# Patient Record
Sex: FEMALE | Race: BLACK OR AFRICAN AMERICAN | Employment: OTHER | URBAN - METROPOLITAN AREA
[De-identification: names, ages, dates, MRNs, and addresses within clinical notes are randomized per-mention and may not be internally consistent; named-entity substitution may affect disease eponyms.]

---

## 2017-01-17 ENCOUNTER — TELEPHONE (OUTPATIENT)
Dept: ORTHOPEDIC SURGERY | Age: 72
End: 2017-01-17

## 2017-01-17 DIAGNOSIS — S96.911A RIGHT FOOT STRAIN, INITIAL ENCOUNTER: Primary | ICD-10-CM

## 2017-01-19 ENCOUNTER — OFFICE VISIT (OUTPATIENT)
Dept: ORTHOPEDIC SURGERY | Age: 72
End: 2017-01-19

## 2017-01-19 VITALS — HEIGHT: 63 IN | BODY MASS INDEX: 37.74 KG/M2 | WEIGHT: 213 LBS | TEMPERATURE: 99.3 F | RESPIRATION RATE: 16 BRPM

## 2017-01-19 DIAGNOSIS — T84.59XS INFECTED PROSTHETIC KNEE JOINT, SEQUELA: ICD-10-CM

## 2017-01-19 DIAGNOSIS — Z96.659 INFECTED PROSTHETIC KNEE JOINT, SEQUELA: ICD-10-CM

## 2017-01-19 DIAGNOSIS — Z96.651 HISTORY OF TOTAL RIGHT KNEE REPLACEMENT: Primary | ICD-10-CM

## 2017-01-19 PROCEDURE — 99024 POSTOP FOLLOW-UP VISIT: CPT | Performed by: PHYSICIAN ASSISTANT

## 2017-01-19 PROCEDURE — 73562 X-RAY EXAM OF KNEE 3: CPT | Performed by: PHYSICIAN ASSISTANT

## 2017-01-19 RX ORDER — OXYCODONE HYDROCHLORIDE AND ACETAMINOPHEN 5; 325 MG/1; MG/1
1 TABLET ORAL EVERY 6 HOURS PRN
Qty: 60 TABLET | Refills: 0 | Status: SHIPPED | OUTPATIENT
Start: 2017-01-19 | End: 2017-03-20

## 2017-01-24 ENCOUNTER — TELEPHONE (OUTPATIENT)
Dept: ORTHOPEDIC SURGERY | Age: 72
End: 2017-01-24

## 2017-01-27 ENCOUNTER — OFFICE VISIT (OUTPATIENT)
Dept: INTERNAL MEDICINE CLINIC | Age: 72
End: 2017-01-27

## 2017-01-27 VITALS
HEART RATE: 84 BPM | OXYGEN SATURATION: 97 % | HEIGHT: 62 IN | SYSTOLIC BLOOD PRESSURE: 124 MMHG | BODY MASS INDEX: 36.25 KG/M2 | TEMPERATURE: 99.1 F | DIASTOLIC BLOOD PRESSURE: 74 MMHG | WEIGHT: 197 LBS

## 2017-01-27 DIAGNOSIS — I10 HYPERTENSION GOAL BP (BLOOD PRESSURE) < 130/80: ICD-10-CM

## 2017-01-27 DIAGNOSIS — D64.89 ANEMIA DUE TO OTHER CAUSE: ICD-10-CM

## 2017-01-27 DIAGNOSIS — E55.9 VITAMIN D DEFICIENCY: ICD-10-CM

## 2017-01-27 DIAGNOSIS — F17.200 TOBACCO DEPENDENCE: ICD-10-CM

## 2017-01-27 DIAGNOSIS — E66.9 OBESITY (BMI 30-39.9): ICD-10-CM

## 2017-01-27 DIAGNOSIS — N18.6 ESRD (END STAGE RENAL DISEASE) (HCC): ICD-10-CM

## 2017-01-27 DIAGNOSIS — E78.49 OTHER HYPERLIPIDEMIA: ICD-10-CM

## 2017-01-27 DIAGNOSIS — K92.0 GASTROINTESTINAL HEMORRHAGE WITH HEMATEMESIS: Primary | ICD-10-CM

## 2017-01-27 DIAGNOSIS — Z98.890 S/P KNEE SURGERY: ICD-10-CM

## 2017-01-27 DIAGNOSIS — K31.89 GASTRIC MASS: ICD-10-CM

## 2017-01-27 PROCEDURE — 99214 OFFICE O/P EST MOD 30 MIN: CPT | Performed by: INTERNAL MEDICINE

## 2017-01-27 PROCEDURE — 99406 BEHAV CHNG SMOKING 3-10 MIN: CPT | Performed by: INTERNAL MEDICINE

## 2017-01-27 RX ORDER — FAMOTIDINE 20 MG
1 TABLET ORAL DAILY
Qty: 30 CAPSULE | Refills: 3 | Status: SHIPPED | OUTPATIENT
Start: 2017-01-27 | End: 2017-02-16 | Stop reason: SDUPTHER

## 2017-01-27 RX ORDER — BUPROPION HYDROCHLORIDE 150 MG/1
150 TABLET ORAL EVERY MORNING
Qty: 30 TABLET | Refills: 3 | Status: SHIPPED | OUTPATIENT
Start: 2017-01-27 | End: 2017-07-13 | Stop reason: SDUPTHER

## 2017-02-02 ENCOUNTER — TELEPHONE (OUTPATIENT)
Dept: INTERNAL MEDICINE CLINIC | Age: 72
End: 2017-02-02

## 2017-02-08 ENCOUNTER — OFFICE VISIT (OUTPATIENT)
Dept: ORTHOPEDIC SURGERY | Age: 72
End: 2017-02-08

## 2017-02-08 VITALS — RESPIRATION RATE: 16 BRPM | BODY MASS INDEX: 36.25 KG/M2 | WEIGHT: 197 LBS | HEART RATE: 72 BPM | HEIGHT: 62 IN

## 2017-02-08 DIAGNOSIS — S96.911A RIGHT FOOT STRAIN, INITIAL ENCOUNTER: Primary | ICD-10-CM

## 2017-02-08 PROCEDURE — 73610 X-RAY EXAM OF ANKLE: CPT | Performed by: ORTHOPAEDIC SURGERY

## 2017-02-08 PROCEDURE — 99213 OFFICE O/P EST LOW 20 MIN: CPT | Performed by: ORTHOPAEDIC SURGERY

## 2017-02-08 PROCEDURE — 73630 X-RAY EXAM OF FOOT: CPT | Performed by: ORTHOPAEDIC SURGERY

## 2017-02-09 ENCOUNTER — OFFICE VISIT (OUTPATIENT)
Dept: CARDIOLOGY CLINIC | Age: 72
End: 2017-02-09

## 2017-02-09 ENCOUNTER — TELEPHONE (OUTPATIENT)
Dept: INTERNAL MEDICINE CLINIC | Age: 72
End: 2017-02-09

## 2017-02-09 VITALS
BODY MASS INDEX: 36.03 KG/M2 | WEIGHT: 197 LBS | DIASTOLIC BLOOD PRESSURE: 50 MMHG | HEART RATE: 76 BPM | SYSTOLIC BLOOD PRESSURE: 110 MMHG

## 2017-02-09 DIAGNOSIS — E55.9 VITAMIN D DEFICIENCY: ICD-10-CM

## 2017-02-09 DIAGNOSIS — I10 ESSENTIAL HYPERTENSION: Primary | ICD-10-CM

## 2017-02-09 DIAGNOSIS — Z86.79 H/O CARDIOMYOPATHY: ICD-10-CM

## 2017-02-09 PROCEDURE — 99214 OFFICE O/P EST MOD 30 MIN: CPT | Performed by: INTERNAL MEDICINE

## 2017-02-16 ENCOUNTER — OFFICE VISIT (OUTPATIENT)
Dept: INTERNAL MEDICINE CLINIC | Age: 72
End: 2017-02-16

## 2017-02-16 VITALS
DIASTOLIC BLOOD PRESSURE: 78 MMHG | HEART RATE: 70 BPM | WEIGHT: 207.8 LBS | BODY MASS INDEX: 38.24 KG/M2 | OXYGEN SATURATION: 98 % | HEIGHT: 62 IN | SYSTOLIC BLOOD PRESSURE: 120 MMHG

## 2017-02-16 DIAGNOSIS — N18.6 ESRD (END STAGE RENAL DISEASE) (HCC): ICD-10-CM

## 2017-02-16 DIAGNOSIS — K92.2 GASTROINTESTINAL HEMORRHAGE, UNSPECIFIED GASTROINTESTINAL HEMORRHAGE TYPE: ICD-10-CM

## 2017-02-16 DIAGNOSIS — M81.0 OSTEOPOROSIS: ICD-10-CM

## 2017-02-16 DIAGNOSIS — Z86.79 H/O CARDIOMYOPATHY: ICD-10-CM

## 2017-02-16 DIAGNOSIS — E55.9 VITAMIN D DEFICIENCY: ICD-10-CM

## 2017-02-16 DIAGNOSIS — K92.0 GASTROINTESTINAL HEMORRHAGE WITH HEMATEMESIS: ICD-10-CM

## 2017-02-16 DIAGNOSIS — M79.671 FOOT PAIN, RIGHT: ICD-10-CM

## 2017-02-16 DIAGNOSIS — E78.49 OTHER HYPERLIPIDEMIA: ICD-10-CM

## 2017-02-16 DIAGNOSIS — I10 ESSENTIAL HYPERTENSION: ICD-10-CM

## 2017-02-16 DIAGNOSIS — F17.200 TOBACCO DEPENDENCE: ICD-10-CM

## 2017-02-16 DIAGNOSIS — D64.89 ANEMIA DUE TO OTHER CAUSE: ICD-10-CM

## 2017-02-16 DIAGNOSIS — I10 HYPERTENSION GOAL BP (BLOOD PRESSURE) < 130/80: Primary | ICD-10-CM

## 2017-02-16 DIAGNOSIS — K31.89 GASTRIC MASS: ICD-10-CM

## 2017-02-16 DIAGNOSIS — I10 HYPERTENSION GOAL BP (BLOOD PRESSURE) < 130/80: ICD-10-CM

## 2017-02-16 LAB
A/G RATIO: 1.4 (ref 1.1–2.2)
ALBUMIN SERPL-MCNC: 3.7 G/DL (ref 3.4–5)
ALBUMIN SERPL-MCNC: 3.9 G/DL (ref 3.4–5)
ALP BLD-CCNC: 146 U/L (ref 40–129)
ALP BLD-CCNC: 147 U/L (ref 40–129)
ALT SERPL-CCNC: <5 U/L (ref 10–40)
ALT SERPL-CCNC: <5 U/L (ref 10–40)
ANION GAP SERPL CALCULATED.3IONS-SCNC: 15 MMOL/L (ref 3–16)
ANION GAP SERPL CALCULATED.3IONS-SCNC: 16 MMOL/L (ref 3–16)
AST SERPL-CCNC: 10 U/L (ref 15–37)
AST SERPL-CCNC: 10 U/L (ref 15–37)
BASOPHILS ABSOLUTE: 0.1 K/UL (ref 0–0.2)
BASOPHILS RELATIVE PERCENT: 1.1 %
BILIRUB SERPL-MCNC: 0.3 MG/DL (ref 0–1)
BILIRUB SERPL-MCNC: <0.2 MG/DL (ref 0–1)
BILIRUBIN DIRECT: <0.2 MG/DL (ref 0–0.3)
BILIRUBIN, INDIRECT: ABNORMAL MG/DL (ref 0–1)
BUN BLDV-MCNC: 27 MG/DL (ref 7–20)
BUN BLDV-MCNC: 27 MG/DL (ref 7–20)
CALCIUM SERPL-MCNC: 9.4 MG/DL (ref 8.3–10.6)
CALCIUM SERPL-MCNC: 9.5 MG/DL (ref 8.3–10.6)
CHLORIDE BLD-SCNC: 95 MMOL/L (ref 99–110)
CHLORIDE BLD-SCNC: 96 MMOL/L (ref 99–110)
CHOLESTEROL, TOTAL: 170 MG/DL (ref 0–199)
CHOLESTEROL, TOTAL: 171 MG/DL (ref 0–199)
CO2: 30 MMOL/L (ref 21–32)
CO2: 31 MMOL/L (ref 21–32)
CREAT SERPL-MCNC: 5.9 MG/DL (ref 0.6–1.2)
CREAT SERPL-MCNC: 5.9 MG/DL (ref 0.6–1.2)
EOSINOPHILS ABSOLUTE: 0.2 K/UL (ref 0–0.6)
EOSINOPHILS RELATIVE PERCENT: 3.2 %
GFR AFRICAN AMERICAN: 8
GFR AFRICAN AMERICAN: 8
GFR NON-AFRICAN AMERICAN: 7
GFR NON-AFRICAN AMERICAN: 7
GLOBULIN: 2.7 G/DL
GLUCOSE BLD-MCNC: 94 MG/DL (ref 70–99)
GLUCOSE BLD-MCNC: 96 MG/DL (ref 70–99)
HCT VFR BLD CALC: 35.3 % (ref 36–48)
HDLC SERPL-MCNC: 42 MG/DL (ref 40–60)
HDLC SERPL-MCNC: 42 MG/DL (ref 40–60)
HEMOGLOBIN: 11 G/DL (ref 12–16)
LDL CHOLESTEROL CALCULATED: 101 MG/DL
LDL CHOLESTEROL CALCULATED: 102 MG/DL
LYMPHOCYTES ABSOLUTE: 1.4 K/UL (ref 1–5.1)
LYMPHOCYTES RELATIVE PERCENT: 26 %
MCH RBC QN AUTO: 29.2 PG (ref 26–34)
MCHC RBC AUTO-ENTMCNC: 31.3 G/DL (ref 31–36)
MCV RBC AUTO: 93.3 FL (ref 80–100)
MONOCYTES ABSOLUTE: 0.5 K/UL (ref 0–1.3)
MONOCYTES RELATIVE PERCENT: 8.6 %
NEUTROPHILS ABSOLUTE: 3.2 K/UL (ref 1.7–7.7)
NEUTROPHILS RELATIVE PERCENT: 61.1 %
PDW BLD-RTO: 19.3 % (ref 12.4–15.4)
PLATELET # BLD: 215 K/UL (ref 135–450)
PMV BLD AUTO: 8.7 FL (ref 5–10.5)
POTASSIUM SERPL-SCNC: 5.5 MMOL/L (ref 3.5–5.1)
POTASSIUM SERPL-SCNC: 5.6 MMOL/L (ref 3.5–5.1)
RBC # BLD: 3.78 M/UL (ref 4–5.2)
SODIUM BLD-SCNC: 141 MMOL/L (ref 136–145)
SODIUM BLD-SCNC: 142 MMOL/L (ref 136–145)
TOTAL PROTEIN: 6.3 G/DL (ref 6.4–8.2)
TOTAL PROTEIN: 6.4 G/DL (ref 6.4–8.2)
TRIGL SERPL-MCNC: 135 MG/DL (ref 0–150)
TRIGL SERPL-MCNC: 136 MG/DL (ref 0–150)
URIC ACID, SERUM: 3.9 MG/DL (ref 2.6–6)
VITAMIN D 25-HYDROXY: 25.7 NG/ML
VLDLC SERPL CALC-MCNC: 27 MG/DL
VLDLC SERPL CALC-MCNC: 27 MG/DL
WBC # BLD: 5.3 K/UL (ref 4–11)

## 2017-02-16 PROCEDURE — 99214 OFFICE O/P EST MOD 30 MIN: CPT | Performed by: INTERNAL MEDICINE

## 2017-02-16 RX ORDER — ALENDRONATE SODIUM 70 MG/1
70 TABLET ORAL
Qty: 4 TABLET | Refills: 3 | Status: SHIPPED | OUTPATIENT
Start: 2017-02-16 | End: 2017-04-25 | Stop reason: SDUPTHER

## 2017-02-16 RX ORDER — FAMOTIDINE 20 MG
1 TABLET ORAL DAILY
Qty: 30 CAPSULE | Refills: 3 | Status: SHIPPED | OUTPATIENT
Start: 2017-02-16 | End: 2017-07-13 | Stop reason: SDUPTHER

## 2017-02-16 ASSESSMENT — PATIENT HEALTH QUESTIONNAIRE - PHQ9
SUM OF ALL RESPONSES TO PHQ9 QUESTIONS 1 & 2: 0
SUM OF ALL RESPONSES TO PHQ QUESTIONS 1-9: 0
1. LITTLE INTEREST OR PLEASURE IN DOING THINGS: 0
2. FEELING DOWN, DEPRESSED OR HOPELESS: 0

## 2017-03-07 ENCOUNTER — OFFICE VISIT (OUTPATIENT)
Dept: ORTHOPEDIC SURGERY | Age: 72
End: 2017-03-07

## 2017-03-07 VITALS
SYSTOLIC BLOOD PRESSURE: 123 MMHG | WEIGHT: 213 LBS | HEART RATE: 68 BPM | BODY MASS INDEX: 37.74 KG/M2 | DIASTOLIC BLOOD PRESSURE: 59 MMHG | HEIGHT: 63 IN

## 2017-03-07 DIAGNOSIS — M19.011 BILATERAL SHOULDER REGION ARTHRITIS: ICD-10-CM

## 2017-03-07 DIAGNOSIS — M19.012 BILATERAL SHOULDER REGION ARTHRITIS: ICD-10-CM

## 2017-03-07 DIAGNOSIS — M25.512 BILATERAL SHOULDER PAIN, UNSPECIFIED CHRONICITY: Primary | ICD-10-CM

## 2017-03-07 DIAGNOSIS — M25.511 BILATERAL SHOULDER PAIN, UNSPECIFIED CHRONICITY: Primary | ICD-10-CM

## 2017-03-07 PROCEDURE — 73030 X-RAY EXAM OF SHOULDER: CPT | Performed by: PHYSICIAN ASSISTANT

## 2017-03-07 PROCEDURE — 20610 DRAIN/INJ JOINT/BURSA W/O US: CPT | Performed by: PHYSICIAN ASSISTANT

## 2017-03-07 PROCEDURE — 99214 OFFICE O/P EST MOD 30 MIN: CPT | Performed by: PHYSICIAN ASSISTANT

## 2017-03-08 PROBLEM — M19.011 BILATERAL SHOULDER REGION ARTHRITIS: Status: ACTIVE | Noted: 2017-03-08

## 2017-03-08 PROBLEM — M19.012 BILATERAL SHOULDER REGION ARTHRITIS: Status: ACTIVE | Noted: 2017-03-08

## 2017-03-22 ENCOUNTER — OFFICE VISIT (OUTPATIENT)
Dept: INFECTIOUS DISEASES | Age: 72
End: 2017-03-22

## 2017-03-22 VITALS
SYSTOLIC BLOOD PRESSURE: 130 MMHG | OXYGEN SATURATION: 98 % | HEART RATE: 67 BPM | TEMPERATURE: 98.2 F | DIASTOLIC BLOOD PRESSURE: 68 MMHG | HEIGHT: 63 IN

## 2017-03-22 DIAGNOSIS — Z96.659 INFECTED PROSTHETIC KNEE JOINT, SEQUELA: Primary | ICD-10-CM

## 2017-03-22 DIAGNOSIS — T84.59XS INFECTED PROSTHETIC KNEE JOINT, SEQUELA: Primary | ICD-10-CM

## 2017-03-22 DIAGNOSIS — N18.6 ESRD (END STAGE RENAL DISEASE) (HCC): ICD-10-CM

## 2017-03-22 DIAGNOSIS — Z96.659 INFECTED PROSTHETIC KNEE JOINT, SEQUELA: ICD-10-CM

## 2017-03-22 DIAGNOSIS — T84.59XS INFECTED PROSTHETIC KNEE JOINT, SEQUELA: ICD-10-CM

## 2017-03-22 LAB
C-REACTIVE PROTEIN: 10.2 MG/L (ref 0–5.1)
SEDIMENTATION RATE, ERYTHROCYTE: 47 MM/HR (ref 0–30)

## 2017-03-22 PROCEDURE — 99213 OFFICE O/P EST LOW 20 MIN: CPT | Performed by: INTERNAL MEDICINE

## 2017-03-28 ENCOUNTER — TELEPHONE (OUTPATIENT)
Dept: INTERNAL MEDICINE CLINIC | Age: 72
End: 2017-03-28

## 2017-03-28 DIAGNOSIS — K59.01 SLOW TRANSIT CONSTIPATION: ICD-10-CM

## 2017-03-28 RX ORDER — CLONIDINE HYDROCHLORIDE 0.3 MG/1
0.3 TABLET ORAL 3 TIMES DAILY
Qty: 90 TABLET | Refills: 5 | Status: SHIPPED | OUTPATIENT
Start: 2017-03-28 | End: 2017-10-03 | Stop reason: DRUGHIGH

## 2017-04-10 ENCOUNTER — TELEPHONE (OUTPATIENT)
Dept: INFECTIOUS DISEASES | Age: 72
End: 2017-04-10

## 2017-04-17 ENCOUNTER — TELEPHONE (OUTPATIENT)
Dept: INTERNAL MEDICINE CLINIC | Age: 72
End: 2017-04-17

## 2017-04-25 ENCOUNTER — OFFICE VISIT (OUTPATIENT)
Dept: INTERNAL MEDICINE CLINIC | Age: 72
End: 2017-04-25

## 2017-04-25 VITALS
DIASTOLIC BLOOD PRESSURE: 60 MMHG | HEART RATE: 74 BPM | SYSTOLIC BLOOD PRESSURE: 102 MMHG | OXYGEN SATURATION: 98 % | HEIGHT: 62 IN

## 2017-04-25 DIAGNOSIS — M81.0 OSTEOPOROSIS: ICD-10-CM

## 2017-04-25 DIAGNOSIS — I10 ESSENTIAL HYPERTENSION: Primary | ICD-10-CM

## 2017-04-25 PROCEDURE — 99214 OFFICE O/P EST MOD 30 MIN: CPT | Performed by: NURSE PRACTITIONER

## 2017-04-25 RX ORDER — ALENDRONATE SODIUM 70 MG/1
70 TABLET ORAL
Qty: 4 TABLET | Refills: 3 | Status: SHIPPED | OUTPATIENT
Start: 2017-04-25 | End: 2017-10-03 | Stop reason: SDUPTHER

## 2017-04-25 ASSESSMENT — ENCOUNTER SYMPTOMS
ALLERGIC/IMMUNOLOGIC NEGATIVE: 1
GASTROINTESTINAL NEGATIVE: 1
EYES NEGATIVE: 1
RESPIRATORY NEGATIVE: 1

## 2017-04-25 ASSESSMENT — PATIENT HEALTH QUESTIONNAIRE - PHQ9
SUM OF ALL RESPONSES TO PHQ9 QUESTIONS 1 & 2: 0
1. LITTLE INTEREST OR PLEASURE IN DOING THINGS: 0
2. FEELING DOWN, DEPRESSED OR HOPELESS: 0
SUM OF ALL RESPONSES TO PHQ QUESTIONS 1-9: 0

## 2017-05-15 ENCOUNTER — TELEPHONE (OUTPATIENT)
Dept: INFECTIOUS DISEASES | Age: 72
End: 2017-05-15

## 2017-05-23 ENCOUNTER — OFFICE VISIT (OUTPATIENT)
Dept: ORTHOPEDIC SURGERY | Age: 72
End: 2017-05-23

## 2017-05-23 VITALS
WEIGHT: 207 LBS | TEMPERATURE: 98 F | SYSTOLIC BLOOD PRESSURE: 94 MMHG | HEIGHT: 62 IN | HEART RATE: 68 BPM | DIASTOLIC BLOOD PRESSURE: 55 MMHG | BODY MASS INDEX: 38.09 KG/M2

## 2017-05-23 DIAGNOSIS — M19.011 BILATERAL SHOULDER REGION ARTHRITIS: Primary | ICD-10-CM

## 2017-05-23 DIAGNOSIS — M19.012 BILATERAL SHOULDER REGION ARTHRITIS: Primary | ICD-10-CM

## 2017-05-23 PROCEDURE — 99212 OFFICE O/P EST SF 10 MIN: CPT | Performed by: PHYSICIAN ASSISTANT

## 2017-05-23 PROCEDURE — 20610 DRAIN/INJ JOINT/BURSA W/O US: CPT | Performed by: PHYSICIAN ASSISTANT

## 2017-05-31 ENCOUNTER — TELEPHONE (OUTPATIENT)
Dept: ORTHOPEDIC SURGERY | Age: 72
End: 2017-05-31

## 2017-05-31 ENCOUNTER — OFFICE VISIT (OUTPATIENT)
Dept: ORTHOPEDIC SURGERY | Age: 72
End: 2017-05-31

## 2017-05-31 VITALS
DIASTOLIC BLOOD PRESSURE: 62 MMHG | WEIGHT: 197 LBS | HEART RATE: 78 BPM | SYSTOLIC BLOOD PRESSURE: 100 MMHG | RESPIRATION RATE: 16 BRPM | BODY MASS INDEX: 36.25 KG/M2 | HEIGHT: 62 IN

## 2017-05-31 DIAGNOSIS — S96.911A RIGHT FOOT STRAIN, INITIAL ENCOUNTER: Primary | ICD-10-CM

## 2017-05-31 PROCEDURE — 99213 OFFICE O/P EST LOW 20 MIN: CPT | Performed by: ORTHOPAEDIC SURGERY

## 2017-06-06 ENCOUNTER — TELEPHONE (OUTPATIENT)
Dept: ORTHOPEDIC SURGERY | Age: 72
End: 2017-06-06

## 2017-06-22 ENCOUNTER — OFFICE VISIT (OUTPATIENT)
Dept: ORTHOPEDIC SURGERY | Age: 72
End: 2017-06-22

## 2017-06-22 VITALS
TEMPERATURE: 98.9 F | HEIGHT: 62 IN | BODY MASS INDEX: 36.25 KG/M2 | SYSTOLIC BLOOD PRESSURE: 126 MMHG | DIASTOLIC BLOOD PRESSURE: 58 MMHG | RESPIRATION RATE: 16 BRPM | HEART RATE: 83 BPM | WEIGHT: 197 LBS

## 2017-06-22 DIAGNOSIS — M25.562 CHRONIC PAIN OF LEFT KNEE: Primary | ICD-10-CM

## 2017-06-22 DIAGNOSIS — G89.29 CHRONIC PAIN OF LEFT KNEE: Primary | ICD-10-CM

## 2017-06-22 DIAGNOSIS — Z96.651 HISTORY OF TOTAL RIGHT KNEE REPLACEMENT: ICD-10-CM

## 2017-06-22 PROCEDURE — 99213 OFFICE O/P EST LOW 20 MIN: CPT | Performed by: ORTHOPAEDIC SURGERY

## 2017-06-23 ENCOUNTER — TELEPHONE (OUTPATIENT)
Dept: ORTHOPEDIC SURGERY | Age: 72
End: 2017-06-23

## 2017-06-23 DIAGNOSIS — R52 PAIN: ICD-10-CM

## 2017-06-23 DIAGNOSIS — T84.012A FAILED TOTAL RIGHT KNEE REPLACEMENT, INITIAL ENCOUNTER (HCC): Primary | ICD-10-CM

## 2017-06-28 ENCOUNTER — TELEPHONE (OUTPATIENT)
Dept: PAIN MANAGEMENT | Age: 72
End: 2017-06-28

## 2017-07-13 ENCOUNTER — OFFICE VISIT (OUTPATIENT)
Dept: INTERNAL MEDICINE CLINIC | Age: 72
End: 2017-07-13

## 2017-07-13 VITALS
HEART RATE: 74 BPM | HEIGHT: 62 IN | BODY MASS INDEX: 41.22 KG/M2 | SYSTOLIC BLOOD PRESSURE: 110 MMHG | WEIGHT: 224 LBS | DIASTOLIC BLOOD PRESSURE: 78 MMHG | OXYGEN SATURATION: 99 %

## 2017-07-13 DIAGNOSIS — M25.50 PAIN, JOINT, MULTIPLE SITES: ICD-10-CM

## 2017-07-13 DIAGNOSIS — J30.89 OTHER ALLERGIC RHINITIS: ICD-10-CM

## 2017-07-13 DIAGNOSIS — R05.9 COUGH: ICD-10-CM

## 2017-07-13 DIAGNOSIS — E78.49 OTHER HYPERLIPIDEMIA: ICD-10-CM

## 2017-07-13 DIAGNOSIS — E55.9 VITAMIN D DEFICIENCY: ICD-10-CM

## 2017-07-13 DIAGNOSIS — N18.6 ESRD (END STAGE RENAL DISEASE) (HCC): ICD-10-CM

## 2017-07-13 DIAGNOSIS — R91.1 PULMONARY NODULE: ICD-10-CM

## 2017-07-13 DIAGNOSIS — F17.200 TOBACCO DEPENDENCE: ICD-10-CM

## 2017-07-13 DIAGNOSIS — I10 ESSENTIAL HYPERTENSION: ICD-10-CM

## 2017-07-13 DIAGNOSIS — T14.8XXA HEMATOMA: Primary | ICD-10-CM

## 2017-07-13 PROBLEM — E78.5 HYPERLIPIDEMIA: Status: ACTIVE | Noted: 2017-07-13

## 2017-07-13 PROCEDURE — 99213 OFFICE O/P EST LOW 20 MIN: CPT | Performed by: INTERNAL MEDICINE

## 2017-07-13 RX ORDER — LORATADINE 10 MG/1
10 TABLET ORAL DAILY PRN
Qty: 30 TABLET | Refills: 1 | Status: SHIPPED | OUTPATIENT
Start: 2017-07-13 | End: 2017-08-12

## 2017-07-13 RX ORDER — BENZONATATE 100 MG/1
100 CAPSULE ORAL 2 TIMES DAILY PRN
Qty: 20 CAPSULE | Refills: 0 | Status: SHIPPED | OUTPATIENT
Start: 2017-07-13 | End: 2017-10-03 | Stop reason: SDUPTHER

## 2017-07-13 RX ORDER — FAMOTIDINE 20 MG
1 TABLET ORAL DAILY
Qty: 30 CAPSULE | Refills: 3 | Status: SHIPPED | OUTPATIENT
Start: 2017-07-13 | End: 2017-10-03 | Stop reason: SDUPTHER

## 2017-07-13 RX ORDER — FLUTICASONE PROPIONATE 50 MCG
2 SPRAY, SUSPENSION (ML) NASAL DAILY PRN
Qty: 1 BOTTLE | Refills: 1 | Status: SHIPPED | OUTPATIENT
Start: 2017-07-13 | End: 2017-10-10

## 2017-07-13 RX ORDER — BUPROPION HYDROCHLORIDE 150 MG/1
150 TABLET ORAL EVERY MORNING
Qty: 30 TABLET | Refills: 3 | Status: SHIPPED | OUTPATIENT
Start: 2017-07-13 | End: 2017-10-03

## 2017-08-10 ENCOUNTER — OFFICE VISIT (OUTPATIENT)
Dept: CARDIOLOGY CLINIC | Age: 72
End: 2017-08-10

## 2017-08-10 VITALS
WEIGHT: 226 LBS | DIASTOLIC BLOOD PRESSURE: 62 MMHG | BODY MASS INDEX: 41.34 KG/M2 | HEART RATE: 68 BPM | SYSTOLIC BLOOD PRESSURE: 100 MMHG

## 2017-08-10 DIAGNOSIS — G47.33 OSA (OBSTRUCTIVE SLEEP APNEA): ICD-10-CM

## 2017-08-10 DIAGNOSIS — E78.00 HYPERCHOLESTEROLEMIA: ICD-10-CM

## 2017-08-10 DIAGNOSIS — I10 ESSENTIAL HYPERTENSION: ICD-10-CM

## 2017-08-10 DIAGNOSIS — Z86.79 H/O CARDIOMYOPATHY: Primary | ICD-10-CM

## 2017-08-10 PROCEDURE — 99214 OFFICE O/P EST MOD 30 MIN: CPT | Performed by: INTERNAL MEDICINE

## 2017-08-10 PROCEDURE — 93000 ELECTROCARDIOGRAM COMPLETE: CPT | Performed by: INTERNAL MEDICINE

## 2017-08-14 ENCOUNTER — TELEPHONE (OUTPATIENT)
Dept: INTERNAL MEDICINE CLINIC | Age: 72
End: 2017-08-14

## 2017-08-15 ENCOUNTER — OFFICE VISIT (OUTPATIENT)
Dept: PAIN MANAGEMENT | Age: 72
End: 2017-08-15

## 2017-08-15 ENCOUNTER — HOSPITAL ENCOUNTER (OUTPATIENT)
Dept: OTHER | Age: 72
Discharge: HOME | End: 2017-08-15
Attending: NURSE PRACTITIONER | Admitting: NURSE PRACTITIONER

## 2017-08-15 VITALS
HEART RATE: 73 BPM | BODY MASS INDEX: 41.34 KG/M2 | WEIGHT: 226 LBS | DIASTOLIC BLOOD PRESSURE: 71 MMHG | SYSTOLIC BLOOD PRESSURE: 133 MMHG

## 2017-08-15 DIAGNOSIS — T84.012A FAILED TOTAL RIGHT KNEE REPLACEMENT, INITIAL ENCOUNTER (HCC): ICD-10-CM

## 2017-08-15 DIAGNOSIS — N18.6 ESRD (END STAGE RENAL DISEASE) (HCC): ICD-10-CM

## 2017-08-15 DIAGNOSIS — Z87.39 HISTORY OF INFECTION OF TOTAL JOINT PROSTHESIS OF KNEE: ICD-10-CM

## 2017-08-15 DIAGNOSIS — G89.29 CHRONIC MIDLINE LOW BACK PAIN WITHOUT SCIATICA: ICD-10-CM

## 2017-08-15 DIAGNOSIS — Z86.718 H/O THROMBOSIS: ICD-10-CM

## 2017-08-15 DIAGNOSIS — G89.4 CHRONIC PAIN SYNDROME: ICD-10-CM

## 2017-08-15 DIAGNOSIS — R60.9 SUBMANDIBULAR GLAND SWELLING: ICD-10-CM

## 2017-08-15 DIAGNOSIS — M19.012 BILATERAL SHOULDER REGION ARTHRITIS: ICD-10-CM

## 2017-08-15 DIAGNOSIS — F51.01 PRIMARY INSOMNIA: ICD-10-CM

## 2017-08-15 DIAGNOSIS — G89.4 CHRONIC PAIN SYNDROME: Primary | ICD-10-CM

## 2017-08-15 DIAGNOSIS — M54.50 CHRONIC MIDLINE LOW BACK PAIN WITHOUT SCIATICA: ICD-10-CM

## 2017-08-15 DIAGNOSIS — I73.9 PERIPHERAL VASCULAR DISEASE, UNSPECIFIED (HCC): ICD-10-CM

## 2017-08-15 DIAGNOSIS — M19.011 BILATERAL SHOULDER REGION ARTHRITIS: ICD-10-CM

## 2017-08-15 DIAGNOSIS — Z96.651 HISTORY OF TOTAL RIGHT KNEE REPLACEMENT: ICD-10-CM

## 2017-08-15 DIAGNOSIS — G47.33 OSA (OBSTRUCTIVE SLEEP APNEA): ICD-10-CM

## 2017-08-15 DIAGNOSIS — K59.03 DRUG-INDUCED CONSTIPATION: ICD-10-CM

## 2017-08-15 DIAGNOSIS — M81.0 OSTEOPOROSIS WITHOUT CURRENT PATHOLOGICAL FRACTURE, UNSPECIFIED OSTEOPOROSIS TYPE: ICD-10-CM

## 2017-08-15 DIAGNOSIS — F43.21 GRIEVING: ICD-10-CM

## 2017-08-15 DIAGNOSIS — F17.200 TOBACCO DEPENDENCE: ICD-10-CM

## 2017-08-15 PROCEDURE — 99244 OFF/OP CNSLTJ NEW/EST MOD 40: CPT | Performed by: NURSE PRACTITIONER

## 2017-08-15 RX ORDER — TRAMADOL HYDROCHLORIDE 50 MG/1
50 TABLET ORAL 2 TIMES DAILY
Qty: 60 TABLET | Refills: 0 | Status: SHIPPED | OUTPATIENT
Start: 2017-08-15 | End: 2017-09-12

## 2017-08-15 RX ORDER — CHOLECALCIFEROL (VITAMIN D3) 125 MCG
5 CAPSULE ORAL DAILY
Qty: 30 TABLET | Refills: 1 | Status: SHIPPED | OUTPATIENT
Start: 2017-08-15 | End: 2017-10-03 | Stop reason: DRUGHIGH

## 2017-08-16 ENCOUNTER — TELEPHONE (OUTPATIENT)
Dept: INTERNAL MEDICINE CLINIC | Age: 72
End: 2017-08-16

## 2017-09-06 RX ORDER — DOXYCYCLINE HYCLATE 100 MG
TABLET ORAL
Qty: 60 TABLET | Refills: 0 | Status: SHIPPED | OUTPATIENT
Start: 2017-09-06 | End: 2018-01-03 | Stop reason: SDUPTHER

## 2017-09-12 ENCOUNTER — TELEPHONE (OUTPATIENT)
Dept: PAIN MANAGEMENT | Age: 72
End: 2017-09-12

## 2017-09-12 ENCOUNTER — OFFICE VISIT (OUTPATIENT)
Dept: PAIN MANAGEMENT | Age: 72
End: 2017-09-12

## 2017-09-12 VITALS — SYSTOLIC BLOOD PRESSURE: 116 MMHG | HEART RATE: 80 BPM | DIASTOLIC BLOOD PRESSURE: 66 MMHG

## 2017-09-12 DIAGNOSIS — M19.011 BILATERAL SHOULDER REGION ARTHRITIS: ICD-10-CM

## 2017-09-12 DIAGNOSIS — M81.0 OSTEOPOROSIS WITHOUT CURRENT PATHOLOGICAL FRACTURE, UNSPECIFIED OSTEOPOROSIS TYPE: ICD-10-CM

## 2017-09-12 DIAGNOSIS — M17.11 OSTEOARTHRITIS OF RIGHT KNEE, UNSPECIFIED OSTEOARTHRITIS TYPE: ICD-10-CM

## 2017-09-12 DIAGNOSIS — I73.9 PERIPHERAL VASCULAR DISEASE, UNSPECIFIED (HCC): ICD-10-CM

## 2017-09-12 DIAGNOSIS — M51.37 DDD (DEGENERATIVE DISC DISEASE), LUMBOSACRAL: ICD-10-CM

## 2017-09-12 DIAGNOSIS — E66.01 MORBID OBESITY DUE TO EXCESS CALORIES (HCC): ICD-10-CM

## 2017-09-12 DIAGNOSIS — G89.4 CHRONIC PAIN SYNDROME: Primary | ICD-10-CM

## 2017-09-12 DIAGNOSIS — M43.16 SPONDYLOLISTHESIS AT L4-L5 LEVEL: ICD-10-CM

## 2017-09-12 DIAGNOSIS — G47.33 OSA (OBSTRUCTIVE SLEEP APNEA): ICD-10-CM

## 2017-09-12 DIAGNOSIS — M47.817 FACET ARTHROPATHY, LUMBOSACRAL: ICD-10-CM

## 2017-09-12 DIAGNOSIS — M19.012 BILATERAL SHOULDER REGION ARTHRITIS: ICD-10-CM

## 2017-09-12 DIAGNOSIS — Z96.651 STATUS POST TOTAL RIGHT KNEE REPLACEMENT: ICD-10-CM

## 2017-09-12 DIAGNOSIS — Z87.39 HISTORY OF INFECTION OF TOTAL JOINT PROSTHESIS OF KNEE: ICD-10-CM

## 2017-09-12 PROCEDURE — 99213 OFFICE O/P EST LOW 20 MIN: CPT | Performed by: NURSE PRACTITIONER

## 2017-09-12 RX ORDER — HYDROCODONE BITARTRATE AND ACETAMINOPHEN 5; 325 MG/1; MG/1
1 TABLET ORAL EVERY 8 HOURS PRN
Qty: 84 TABLET | Refills: 0 | Status: SHIPPED | OUTPATIENT
Start: 2017-09-12 | End: 2017-10-10 | Stop reason: SDUPTHER

## 2017-09-14 ENCOUNTER — HOSPITAL ENCOUNTER (OUTPATIENT)
Dept: NON INVASIVE DIAGNOSTICS | Age: 72
Discharge: HOME | End: 2017-09-14
Admitting: INTERNAL MEDICINE

## 2017-09-14 DIAGNOSIS — G47.33 OBSTRUCTIVE SLEEP APNEA: ICD-10-CM

## 2017-09-15 ENCOUNTER — TELEPHONE (OUTPATIENT)
Dept: INTERNAL MEDICINE CLINIC | Age: 72
End: 2017-09-15

## 2017-09-19 ENCOUNTER — HOSPITAL ENCOUNTER (OUTPATIENT)
Dept: NON INVASIVE DIAGNOSTICS | Age: 72
Discharge: HOME | End: 2017-09-19
Admitting: INTERNAL MEDICINE

## 2017-09-19 DIAGNOSIS — G47.33 OBSTRUCTIVE SLEEP APNEA: ICD-10-CM

## 2017-09-19 LAB
LV EF: 70 %
LVEF MODALITY: NORMAL

## 2017-09-20 ENCOUNTER — TELEPHONE (OUTPATIENT)
Dept: INFECTIOUS DISEASES | Age: 72
End: 2017-09-20

## 2017-09-20 ENCOUNTER — OFFICE VISIT (OUTPATIENT)
Dept: INFECTIOUS DISEASES | Age: 72
End: 2017-09-20

## 2017-09-20 VITALS
HEART RATE: 67 BPM | OXYGEN SATURATION: 98 % | SYSTOLIC BLOOD PRESSURE: 138 MMHG | HEIGHT: 62 IN | DIASTOLIC BLOOD PRESSURE: 78 MMHG | TEMPERATURE: 98.6 F

## 2017-09-20 DIAGNOSIS — T84.59XD INFECTED PROSTHETIC KNEE JOINT, SUBSEQUENT ENCOUNTER: ICD-10-CM

## 2017-09-20 DIAGNOSIS — T84.59XD INFECTED PROSTHETIC KNEE JOINT, SUBSEQUENT ENCOUNTER: Primary | ICD-10-CM

## 2017-09-20 DIAGNOSIS — N18.6 ESRD (END STAGE RENAL DISEASE) (HCC): ICD-10-CM

## 2017-09-20 DIAGNOSIS — Z96.659 INFECTED PROSTHETIC KNEE JOINT, SEQUELA: Primary | ICD-10-CM

## 2017-09-20 DIAGNOSIS — Z96.659 INFECTED PROSTHETIC KNEE JOINT, SUBSEQUENT ENCOUNTER: ICD-10-CM

## 2017-09-20 DIAGNOSIS — T84.59XS INFECTED PROSTHETIC KNEE JOINT, SEQUELA: Primary | ICD-10-CM

## 2017-09-20 DIAGNOSIS — Z96.659 INFECTED PROSTHETIC KNEE JOINT, SUBSEQUENT ENCOUNTER: Primary | ICD-10-CM

## 2017-09-20 LAB
C-REACTIVE PROTEIN: 18.2 MG/L (ref 0–5.1)
REASON FOR REJECTION: NORMAL
REJECTED TEST: NORMAL

## 2017-09-20 PROCEDURE — 99213 OFFICE O/P EST LOW 20 MIN: CPT | Performed by: INTERNAL MEDICINE

## 2017-09-20 RX ORDER — FLUCONAZOLE 100 MG/1
100 TABLET ORAL DAILY
Qty: 5 TABLET | Refills: 1 | Status: SHIPPED | OUTPATIENT
Start: 2017-09-20 | End: 2017-09-25

## 2017-09-20 NOTE — MR AVS SNAPSHOT
After Visit Summary             Mattie Cranker   2017 8:00 AM   Office Visit    Description:  Female : 1945   Provider:  Karina Rodrigez MD   Department:  [facility]              Your Follow-Up and Future Appointments         Below is a list of your follow-up and future appointments. This may not be a complete list as you may have made appointments directly with providers that we are not aware of or your providers may have made some for you. Please call your providers to confirm appointments. It is important to keep your appointments. Please bring your current insurance card, photo ID, co-pay, and all medication bottles to your appointment. If self-pay, payment is expected at the time of service. Your To-Do List     Future Appointments Provider Department Dept Phone    2017 10:30 AM An [place], [facility] [phone]    Please arrive 15 minutes prior to appointment, bring photo ID and insurance card. 10/10/2017 1:00 PM Mohit Ken NP [facility] [phone]    Please arrive 15 minutes prior to appointment time, bring insurance card and photo ID.     3/21/2018 8:00 AM Karina Rodrigez MD [facility] [phone]    Please arrive 15 minutes prior to appointment time, bring insurance card and photo ID. Future Orders Complete By Expires    Sedimentation Rate [[id number] Custom]  10/4/2017 2018    Follow-Up    Return in about 6 months (around 3/20/2018).          Information from Your Visit        Department     Name Address Phone Fax    [facility] [address] [place] [address] [address]  [address]  [address] [place] [address] [address] [phone]      You Were Seen for:         Comments    Infected prosthetic knee joint, subsequent encounter   [[id number]]         Vital Signs     Blood Pressure Pulse Temperature Height Last Menstrual Period Oxygen Saturation 138/78 (Site: Right Arm, Position: Sitting, Cuff Size: Large Adult) 67 98.6 °F (37 °C) (Oral) 5' 2\" (1.575 m) 01/[birth year]/1964 98%    Smoking Status                   Current Every Day Smoker           Additional Information about your Body Mass Index (BMI)           Your BMI as listed above is considered obese (30 or more). BMI is an estimate of body fat, calculated from your height and weight. The higher your BMI, the greater your risk of heart disease, high blood pressure, type 2 diabetes, stroke, gallstones, arthritis, sleep apnea, and certain cancers. BMI is not perfect. It may overestimate body fat in athletes and people who are more muscular. Even a small weight loss (between 5 and 10 percent of your current weight) by decreasing your calorie intake and becoming more physically active will help lower your risk of developing or worsening diseases associated with obesity. Learn more at: [employer]          Instructions         Stopping Smokeless Tobacco Use: Care Instructions  Your Care Instructions  Smokeless tobacco comes in many forms, such as snuff and chewing tobacco:  · Snuff is finely ground tobacco sold in cans or pouches. Most of the time, snuff is used by putting a \"pinch\" or \"dip\" between the lower lip or cheek and the gum. · Chewing tobacco is sold as loose leaves, plugs, or twists. It is chewed or placed between the cheek and the gum or teeth. There are plenty of reasons to stop using smokeless tobacco. These products are harmful. They are not risk-free alternatives to smoking. Smokeless tobacco contains nicotine, which is addicting. Though using smokeless tobacco is less harmful than smoking cigarettes, it can cause serious health problems, such as:  · White patches or red sores in your mouth that can turn into mouth cancer involving the lip, tongue, or cheek. · Tooth loss and other dental problems. · Gum disease. Your gums may pull away from your teeth and not grow back. People who use smokeless tobacco crave the nicotine in it. Giving up smokeless tobacco is much harder than simply changing a habit. Your body has to stop craving the nicotine. It is hard to quit, but you can do it. Many tools are available for people who want to quit using smokeless tobacco. You may find that combining tools works best for you. There are several steps to quitting. First you get ready to quit. Then you get support to help you. After that, you learn new skills and behaviors to quit. For many people, a necessary step is getting and using medicine. Your doctor will help you set up the plan that best meets your needs. You may want to attend a tobacco cessation program. When you choose a program, look for one that has proven success. Ask your doctor for ideas. You will greatly increase your chances of success if you take medicine as well as get counseling or join a cessation program.  Some of the changes you feel when you first quit smokeless tobacco are uncomfortable. Your body will miss the nicotine at first, and you may feel short-tempered and grumpy. You may have trouble sleeping or concentrating. Medicine can help you deal with these symptoms. You may struggle with changing your habits and rituals. The last step is the tricky one: Be prepared for the urge to use smokeless tobacco to continue for a time. This is a lot to deal with, but keep at it. You will feel better. Follow-up care is a key part of your treatment and safety. Be sure to make and go to all appointments, and call your doctor if you are having problems. It's also a good idea to know your test results and keep a list of the medicines you take. How can you care for yourself at home? · Ask your family, friends, and coworkers for support. You have a better chance of quitting if you have help and support. again. Make a list of things you learned, and think about when you want to try again, such as next week, next month, or next year. Where can you learn more? Go to https://LensVectorpepiceweb.[employer]. org and sign in to your SafeStore account. Enter A848 in the [facility] box to learn more about \"Stopping Smokeless Tobacco Use: Care Instructions. \"     If you do not have an account, please click on the \"Sign Up Now\" link. Current as of: March 20, 2017  Content Version: 11.3  © 4058-6441 [employer]. Care instructions adapted under license by [facility] ([facility]). If you have questions about a medical condition or this instruction, always ask your healthcare professional. [place] [address] any warranty or liability for your use of this information. Today's Medication Changes          These changes are accurate as of: 9/20/17  8:56 AM.  If you have any questions, ask your nurse or doctor. START taking these medications           fluconazole 100 MG tablet   Commonly known as:  DIFLUCAN   Instructions: Take 1 tablet by mouth daily for 5 days   Quantity:  5 tablet   Refills:  1   Started by:  Rikki Barksdale MD            Where to Get Your Medications      These medications were sent to [facility], [address] [place] [address]     Phone:  [phone]     fluconazole 100 MG tablet               Your Current Medications Are              fluconazole (DIFLUCAN) 100 MG tablet Take 1 tablet by mouth daily for 5 days    HYDROcodone-acetaminophen (NORCO) 5-325 MG per tablet Take 1 tablet by mouth every 8 hours as needed for Pain .     doxycycline hyclate (VIBRA-TABS) 100 MG tablet TAKE 1 TABLET BY MOUTH TWICE DAILY    nicotine polacrilex (NICORETTE) 2 MG gum Take 1 each by mouth as needed for Smoking cessation Pneumococcal Vaccines (two) for age 72 years & over with: cerebrospinal fluid leaks, cochlear implants, hemoglobinopathies,  asplenia, immunodeficiencies, HIV infection, or chronic renal failure (2 of 2 - PCV13) 4/22/2015    Yearly Flu Vaccine (1) 9/1/2017    Mammograms are recommended every 2 years for low/average risk patients aged 48 - 69, and every year for high risk patients per updated national guidelines. However these guidelines can be individualized by your provider. 10/29/2017    Cholesterol Screening 2/16/2022    Colonoscopy 4/11/2023            MyChart Signup           Our records indicate that you have declined MyChart signup.

## 2017-09-20 NOTE — PATIENT INSTRUCTIONS
Stopping Smokeless Tobacco Use: Care Instructions  Your Care Instructions  Smokeless tobacco comes in many forms, such as snuff and chewing tobacco:  · Snuff is finely ground tobacco sold in cans or pouches. Most of the time, snuff is used by putting a \"pinch\" or \"dip\" between the lower lip or cheek and the gum. · Chewing tobacco is sold as loose leaves, plugs, or twists. It is chewed or placed between the cheek and the gum or teeth. There are plenty of reasons to stop using smokeless tobacco. These products are harmful. They are not risk-free alternatives to smoking. Smokeless tobacco contains nicotine, which is addicting. Though using smokeless tobacco is less harmful than smoking cigarettes, it can cause serious health problems, such as:  · White patches or red sores in your mouth that can turn into mouth cancer involving the lip, tongue, or cheek. · Tooth loss and other dental problems. · Gum disease. Your gums may pull away from your teeth and not grow back. People who use smokeless tobacco crave the nicotine in it. Giving up smokeless tobacco is much harder than simply changing a habit. Your body has to stop craving the nicotine. It is hard to quit, but you can do it. Many tools are available for people who want to quit using smokeless tobacco. You may find that combining tools works best for you. There are several steps to quitting. First you get ready to quit. Then you get support to help you. After that, you learn new skills and behaviors to quit. For many people, a necessary step is getting and using medicine. Your doctor will help you set up the plan that best meets your needs. You may want to attend a tobacco cessation program. When you choose a program, look for one that has proven success. Ask your doctor for ideas.  You will greatly increase your chances of success if you take medicine as well as get counseling or join a cessation program.  Some of the changes you feel when you first quit smokeless tobacco are uncomfortable. Your body will miss the nicotine at first, and you may feel short-tempered and grumpy. You may have trouble sleeping or concentrating. Medicine can help you deal with these symptoms. You may struggle with changing your habits and rituals. The last step is the tricky one: Be prepared for the urge to use smokeless tobacco to continue for a time. This is a lot to deal with, but keep at it. You will feel better. Follow-up care is a key part of your treatment and safety. Be sure to make and go to all appointments, and call your doctor if you are having problems. It's also a good idea to know your test results and keep a list of the medicines you take. How can you care for yourself at home? · Ask your family, friends, and coworkers for support. You have a better chance of quitting if you have help and support. · Join a support group for people who are trying to quit using smokeless tobacco.  · Set a quit date. Pick your date carefully so that it is not right in the middle of a big deadline or stressful time. After you quit, do not use smokeless tobacco even once. Get rid of all spit cups, cans, and pouches after your last use. Clean your house and your clothes so that they do not smell of tobacco.  · Learn how to be a non-user. Think about ways you can avoid those things that make you reach for tobacco.  ¨ Learn some ways to deal with cravings, like calling a friend or going for a walk. Cravings often pass. ¨ Avoid situations that put you at greatest risk for using smokeless tobacco. For some people, it is hard to spend time with friends without dipping or chewing. For others, they might skip a coffee break with coworkers who smoke or use smokeless tobacco.  ¨ Change your daily routine. Take a different route to work, or eat a meal in a different place. · Cut down on stress.  Calm yourself or release tension by doing an activity you enjoy, such as reading a book, taking a hot

## 2017-09-21 ENCOUNTER — OFFICE VISIT (OUTPATIENT)
Dept: CARDIOLOGY CLINIC | Age: 72
End: 2017-09-21

## 2017-09-21 ENCOUNTER — TELEPHONE (OUTPATIENT)
Dept: INFECTIOUS DISEASES | Age: 72
End: 2017-09-21

## 2017-09-21 VITALS
SYSTOLIC BLOOD PRESSURE: 124 MMHG | WEIGHT: 217 LBS | HEART RATE: 68 BPM | DIASTOLIC BLOOD PRESSURE: 60 MMHG | BODY MASS INDEX: 39.69 KG/M2

## 2017-09-21 DIAGNOSIS — I10 ESSENTIAL HYPERTENSION: Primary | ICD-10-CM

## 2017-09-21 DIAGNOSIS — Z86.79 H/O CARDIOMYOPATHY: ICD-10-CM

## 2017-09-21 DIAGNOSIS — E55.9 VITAMIN D DEFICIENCY: ICD-10-CM

## 2017-09-21 PROCEDURE — 99214 OFFICE O/P EST MOD 30 MIN: CPT | Performed by: INTERNAL MEDICINE

## 2017-10-03 ENCOUNTER — OFFICE VISIT (OUTPATIENT)
Dept: INTERNAL MEDICINE CLINIC | Age: 72
End: 2017-10-03

## 2017-10-03 VITALS
DIASTOLIC BLOOD PRESSURE: 72 MMHG | WEIGHT: 227 LBS | HEART RATE: 78 BPM | SYSTOLIC BLOOD PRESSURE: 120 MMHG | HEIGHT: 62 IN | BODY MASS INDEX: 41.77 KG/M2 | TEMPERATURE: 98.1 F | OXYGEN SATURATION: 98 %

## 2017-10-03 DIAGNOSIS — J40 BRONCHITIS: ICD-10-CM

## 2017-10-03 DIAGNOSIS — N18.6 ESRD (END STAGE RENAL DISEASE) (HCC): ICD-10-CM

## 2017-10-03 DIAGNOSIS — E78.5 HYPERLIPIDEMIA LDL GOAL <100: ICD-10-CM

## 2017-10-03 DIAGNOSIS — J30.89 ALLERGIC RHINITIS DUE TO OTHER ALLERGEN: ICD-10-CM

## 2017-10-03 DIAGNOSIS — M81.8 OTHER OSTEOPOROSIS: ICD-10-CM

## 2017-10-03 DIAGNOSIS — E55.9 VITAMIN D DEFICIENCY: ICD-10-CM

## 2017-10-03 DIAGNOSIS — R91.1 PULMONARY NODULE: ICD-10-CM

## 2017-10-03 DIAGNOSIS — I10 ESSENTIAL HYPERTENSION: Primary | ICD-10-CM

## 2017-10-03 DIAGNOSIS — F17.200 TOBACCO DEPENDENCE: ICD-10-CM

## 2017-10-03 PROCEDURE — 99215 OFFICE O/P EST HI 40 MIN: CPT | Performed by: INTERNAL MEDICINE

## 2017-10-03 RX ORDER — MINOXIDIL 2.5 MG/1
2.5 TABLET ORAL 2 TIMES DAILY
COMMUNITY
End: 2017-10-03 | Stop reason: SDUPTHER

## 2017-10-03 RX ORDER — AZITHROMYCIN 250 MG/1
TABLET, FILM COATED ORAL
Qty: 1 PACKET | Refills: 0 | Status: SHIPPED | OUTPATIENT
Start: 2017-10-03 | End: 2017-10-10

## 2017-10-03 RX ORDER — LORATADINE 10 MG/1
10 TABLET ORAL DAILY PRN
Qty: 30 TABLET | Refills: 3 | Status: SHIPPED | OUTPATIENT
Start: 2017-10-03 | End: 2017-10-10

## 2017-10-03 RX ORDER — FAMOTIDINE 20 MG
1 TABLET ORAL DAILY
Qty: 30 CAPSULE | Refills: 3 | Status: SHIPPED | OUTPATIENT
Start: 2017-10-03 | End: 2018-03-12 | Stop reason: ALTCHOICE

## 2017-10-03 RX ORDER — BENZONATATE 100 MG/1
100 CAPSULE ORAL 2 TIMES DAILY PRN
Qty: 20 CAPSULE | Refills: 0 | Status: SHIPPED | OUTPATIENT
Start: 2017-10-03 | End: 2018-01-30 | Stop reason: ALTCHOICE

## 2017-10-03 RX ORDER — CLONIDINE HYDROCHLORIDE 0.2 MG/1
0.2 TABLET ORAL 3 TIMES DAILY
COMMUNITY
End: 2020-01-01 | Stop reason: ALTCHOICE

## 2017-10-03 RX ORDER — ALENDRONATE SODIUM 70 MG/1
70 TABLET ORAL
Qty: 4 TABLET | Refills: 3 | Status: SHIPPED | OUTPATIENT
Start: 2017-10-03 | End: 2018-03-12 | Stop reason: SDUPTHER

## 2017-10-03 RX ORDER — ALBUTEROL SULFATE 90 UG/1
2 AEROSOL, METERED RESPIRATORY (INHALATION) 4 TIMES DAILY PRN
Qty: 1 INHALER | Refills: 0 | Status: SHIPPED | OUTPATIENT
Start: 2017-10-03 | End: 2019-06-25

## 2017-10-03 NOTE — PROGRESS NOTES
SUBJECTIVE:  Glory Garcia is a 74 y.o. female [address] Complaint   Patient presents with    Cough     cough at night, productive (clear), body sore from cough    Hypertension    Other        PT HERE FOR EVAL    HTN -  MEDS CHANGES PER RENAL. OCC HEADACHE - NONE NOW , DIZZINESS No  HLP -  ? EXERCISE / DIET COMPLIANCE . LABS D/W PT  VIT D DEF - TAKING MED. LABS D/W PT  C/O COUGH - STATES INCREASED RECENTLY. OCC PRODUCTIVE - ? COLOR OF PHLEGM, NO HEMOPTYSIS. NO F/C, OCC RIB PAIN WITH COUGH. NO WHEEZING  ESRD - ON DIALYSIS. TOLERATING. STATES NEEDING ORDER FOR PORT  STILL SMOKING - CESSATION READDRESSED. STATES USING NICOTINE GUM  PULM. NODULE - NOTED ON CT D/W PT  OSTEOPOROSIS - ? MED COMPLIANCE  ALLERGIC RHINITIS - OCC  NASAL CONGESTION, + POSTNASAL DRAINAGE , NO SINUS PRESSURE, ? HA, + SNEEZING, + OCC WATERY ITCHY EYES. DENIES CP NOW, No SOB, No PALPITATIONS  No ABD PAIN, No N/V, No DIARRHEA, No CONSTIPATION, No MELENA, No HEMATOCHEZIA. No DYSURIA, No FREQ, No URGENCY, No HEMATURIA - MAKES LITTLE URINE    PMH: REVIEWED    PSH: REVIEWED:    ALLERGY:  Iv dye [iodides]; Lisinopril; Peanut oil; Hydralazine; and Lipitor [atorvastatin]    MEDS: REVIEWED    ROS: COMPREHENSIVE ROS AS IN HX, REST -VE  History obtained from chart review and the patient    OBJECTIVE:   NURSING NOTE AND VITALS REVIEWED  /60 (Site: Right Arm, Position: Sitting, Cuff Size: Medium Adult)  Pulse 101  Temp 98.1 °F (36.7 °C) (Oral)   Ht 5' 2\" (1.575 m)  Wt 227 lb (103 kg)  LMP 01/21/1964  SpO2 98%  BMI 41.52 kg/m2    NO ACUTE DISTRESS    REPEAT BP:  120/72 (RT)    REPEAT PULSE:  78    Body mass index is 41.52 kg/(m^2).      HEENT: NO PALLOR, ANICTERIC, PERRLA, EOMI, NO CONJUNCTIVAL ERYTHEMA,                 + NASAL CONGESTION, NO SINUS TENDERNESS, NO PHARYNGEAL ERYTHEMA  NECK:  SUPPLE, TRACHEA MIDLINE, NT, NO JVD, NO CB, NO LA, NO TM, NO STIFFNESS  CHEST: RESPY EFFORT NL, GOOD AE, FEW RHONCHI, NO W/C  HEART: S1S2+ REG, NO M/G/R  ABD: OBESE, SOFT, NT, NO HSM, BS+  EXT: NO EDEMA, NT, PULSES +. KEARA'S -VE  NEURO: ALERT AND ORIENTED X 3, NO MENINGEAL SIGNS, NO TREMORS, AMBULATING WITH A POWER CHAIR OTHERWISE, NO NEW FOCAL DEFICITS  PSYCH: FAIRLY GOOD AFFECT  BACK: NT, NO ROM, NO CVA TENDERNESS    PREVIOUS LABS / CT REVIEWED AND D/W PT       ASSESSMENT / PLAN:    1. Essential hypertension  COUNSELLED. CONTINUE CURRENT MEDS. LOW NA+ / DASH DIET/ EXERCISE. MONITOR. GOAL </= 120/80  MAKE CHANGES AS NEEDED. 2. Hyperlipidemia LDL goal <100  COUNSELLED. ADVISED LOW FAT / CHOL DIET/ EXERCISE.  MONITOR. GOALS D/W PT.  MAKE CHANGES AS NEEDED. 3. Vitamin D deficiency  COUNSELLED. ADVISED MED COMPLIANCE - ADVISED VIT D 1000 U DAILY. MONITOR AND MAKE CHANGES AS NEEDED. 4. Bronchitis / cough  COUNSELLED. SYMPTOMATIC RX  TESSALON PERLES PRN  Z PATRICE X 5 DAYS  ALBUTEROL PRN  ADVISED SMOKING CESSATION. MONITOR  MAKE CHANGES AS NEEDED. 5. ESRD (end stage renal disease) ([facility] [address]  COUNSELLED. CONTINUE HD  DEFER ORDER  TO RENAL  MAKE CHANGES AS NEEDED. 6. Tobacco dependence  Smoking cessation was encouraged. Cessation techniques reviewed today. COUNSELLED. CESSATION ADVISED   [place] TOBACCO USE CESSATION INTERMEDIATE 0-51 MINUTES  COMPLICATIONS OF TOBACCO USE INCLUDING COPD, CANCER, CAD D/W PT  PT VERBALIZED UNDERSTANDING  CONTINUE NICOTINE GUM - S/E D/W PT  WELLBUTRIN XL D/CAMILO PER PT REQUEST  MAKE CHANGES AS NEEDED. 7. Pulmonary nodule  COUNSELLED. READDRESS FOLLOW UP STUDY AS RECOMMENDED 7/18  MAKE CHANGES AS NEEDED. 8. Other osteoporosis  COUNSELLED. ADVISED MED COMPLIANCE - FOSAMAX 70 MG   ADVISED MOE/ VIT D. EXERCISES. MONITOR. MAKE CHANGES AS NEEDED. 9. Allergic rhinitis due to other allergen  COUNSELLED. ADVISED CLARITIN 10 MG DAILY / PRN. MONITOR. MAKE CHANGES AS NEEDED.              DEFERRED FLU VACCINE    MORE THAN HALF THE TIME SPENT ON COUNSELLING    Maegan received counseling on the

## 2017-10-03 NOTE — MR AVS SNAPSHOT
estimate of body fat, calculated from your height and weight. The higher your BMI, the greater your risk of heart disease, high blood pressure, type 2 diabetes, stroke, gallstones, arthritis, sleep apnea, and certain cancers. BMI is not perfect. It may overestimate body fat in athletes and people who are more muscular. Even a small weight loss (between 5 and 10 percent of your current weight) by decreasing your calorie intake and becoming more physically active will help lower your risk of developing or worsening diseases associated with obesity. Learn more at: [email or web address][email or web address]          Instructions    TAKE MED AS ADVISED    DIET/ EXERCISE. FOLLOW UP WITHIN 3 MONTHS / AS NEEDED    FOLLOW UP WITH NEPHROLOGY              Today's Medication Changes          These changes are accurate as of: 10/3/17 12:25 PM.  If you have any questions, ask your nurse or doctor. START taking these medications           albuterol sulfate  (90 Base) MCG/ACT inhaler   Commonly known as:  PROAIR HFA   Instructions:  Inhale 2 puffs into the lungs 4 times daily as needed for Wheezing   Quantity:  1 Inhaler   Refills:  0   Started by:  Anastasiya Wasserman MD       azithromycin 250 MG tablet   Commonly known as:  ZITHROMAX   Instructions: Take 2 tabs (500 mg) on Day 1, and take 1 tab (250 mg) on days 2 through 5. Quantity:  1 packet   Refills:  0   Started by:  Anastasiya Wasserman MD       loratadine 10 MG tablet   Commonly known as:  CLARITIN   Instructions:   Take 1 tablet by mouth daily as needed (prn)   Quantity:  30 tablet   Refills:  3   Started by:  Anastasiya Wasserman MD         STOP taking these medications           bisacodyl 5 MG EC tablet   Commonly known as:  bisacodyl   Stopped by:  Anastasiya Wasserman MD       buPROPion 150 MG extended release tablet   Commonly known as:  WELLBUTRIN XL   Stopped by:  Anastasiya Wasserman MD       PRILOSEC 40 MG delayed release capsule Generic drug:  omeprazole   Stopped by:  Marcin Causey MD            Where to Get Your Medications      These medications were sent to [address] [place] [address], [address] [place] [address] Drive, [address] [place] [id number]     Phone:  [phone]     albuterol sulfate  (90 Base) MCG/ACT inhaler    alendronate 70 MG tablet    azithromycin 250 MG tablet    benzonatate 100 MG capsule    loratadine 10 MG tablet    Vitamin D (Cholecalciferol) 1000 units Caps               Your Current Medications Are              cloNIDine (CATAPRES) 0.2 MG tablet Take 0.2 mg by mouth 3 times daily    benzonatate (TESSALON PERLES) 100 MG capsule Take 1 capsule by mouth 2 times daily as needed for Cough    azithromycin (ZITHROMAX) 250 MG tablet Take 2 tabs (500 mg) on Day 1, and take 1 tab (250 mg) on days 2 through 5.    albuterol sulfate HFA (PROAIR HFA) 108 (90 Base) MCG/ACT inhaler Inhale 2 puffs into the lungs 4 times daily as needed for Wheezing    Vitamin D, Cholecalciferol, 1000 units CAPS Take 1,000 Units by mouth daily    alendronate (FOSAMAX) 70 MG tablet Take 1 tablet by mouth every 7 days    loratadine (CLARITIN) 10 MG tablet Take 1 tablet by mouth daily as needed (prn)    HYDROcodone-acetaminophen (NORCO) 5-325 MG per tablet Take 1 tablet by mouth every 8 hours as needed for Pain .     doxycycline hyclate (VIBRA-TABS) 100 MG tablet TAKE 1 TABLET BY MOUTH TWICE DAILY    nicotine polacrilex (NICORETTE) 2 MG gum Take 1 each by mouth as needed for Smoking cessation    fluticasone (FLONASE) 50 MCG/ACT nasal spray 2 sprays by Nasal route daily as needed for Rhinitis    promethazine (PHENERGAN) 25 MG tablet Take 25 mg by mouth every 8 hours as needed for Nausea    calcium acetate (PHOSLO) 667 MG capsule Take by mouth 3 times daily (with meals)    minoxidil (LONITEN) 2.5 MG tablet TAKE 1 TABLET BY MOUTH TWICE DAILY amLODIPine (NORVASC) 10 MG tablet Take 10 mg by mouth daily      Allergies              Iv Dye [Iodides] Anaphylaxis    Lisinopril Swelling    Face swelling    Peanut Oil Swelling    Throat swelling per patient description    Hydralazine Itching        Lipitor [Atorvastatin] Other (See Comments)    myalgia      We Ordered/Performed the following           [place] TOBACCO USE CESSATION INTERMEDIATE 3-10 MINUTES          Additional Information        Basic Information     Date Of Birth Sex Race Ethnicity Preferred Language    1945 Female Black Non-/Non  English      Problem List as of 10/3/2017  Date Reviewed: 10/3/2017                ZULEMA (obstructive sleep apnea)    Osteolysis of left lower leg    Inguinal lymphadenopathy    Massive right Breast hypertrophy    ESRD (end stage renal disease)    Right knee DJD    Hypertension    Allergic rhinitis due to other allergen    DDD (degenerative disc disease), lumbosacral    Facet arthropathy, lumbosacral (HCC)    Spondylolisthesis at L4-L5 level    Chronic pain syndrome    Other allergic rhinitis    Pulmonary nodule    Hyperlipidemia    Bilateral shoulder region arthritis    Osteoporosis    Gastric mass    History of total right knee replacement revision right knee arthroplasty 11/15/2016 for sepsis    Tibial tendonitis, posterior    Right foot strain    Bilateral carotid artery stenosis (Chronic)    CAFL (chronic airflow limitation) ([facility])    Acid reflux    Personal history of other diseases of the circulatory system    H/O cardiomyopathy    Hypercholesterolemia    History of infection of total joint prosthesis of knee    Failed total right knee replacement (HCC)    Vitamin D deficiency    Tobacco dependence    Chronic pain of right knee    Infected prosthetic knee joint (HCC)    Anemia    S/P TKR (total knee replacement)    Femoral artery stenosis, right (HCC)    Vocal cord cyst    Edema glottis    Laryngopharyngeal reflux    Submandibular gland swelling Neurogenic bladder    Chronic infection of bone ([facility]    Morbid obesity ([facility]    H/O thrombosis      Your Goals as of 10/3/2017 at 12:25 PM              Today    Today    9/21/17       Blood Pressure    Blood Pressure < 140/90   120/72  122/60  124/60    Notes    Barriers to success: none  Plan for overcoming my barriers: N/A     Confidence: 1/10  Date goal set: 2/16/17  Date goal attained:           Immunizations as of 10/3/2017     Name Date    Influenza Virus Vaccine 10/15/2011    Influenza Whole 9/14/2015    Pneumococcal Polysaccharide ([id number]) 4/22/2014      Preventive Care        Date Due    Tetanus Combination Vaccine (1 - Tdap) 2/11/1962    Zoster Vaccine 2/11/2003    Pneumococcal Vaccines (two) for age 72 years & over with: cerebrospinal fluid leaks, cochlear implants, hemoglobinopathies,  asplenia, immunodeficiencies, HIV infection, or chronic renal failure (2 of 2 - PCV13) 4/22/2015    Yearly Flu Vaccine (1) 9/1/2017    Mammograms are recommended every 2 years for low/average risk patients aged 48 - 69, and every year for high risk patients per updated national guidelines. However these guidelines can be individualized by your provider. 10/29/2017    Cholesterol Screening 2/16/2022    Colonoscopy 4/11/2023            MyChart Signup           Our records indicate that you have declined MyChart signup.

## 2017-10-03 NOTE — PROGRESS NOTES
implant     REVISION TOTAL KNEE ARTHROPLASTY Right 11/15/2016    Dr. Ander Aase - staged revision due to infection    TOTAL KNEE ARTHROPLASTY Right 2007    Dr. Maliha Wilkerson Left 2008    Dr. Se Noel       Current Medications:    Current Outpatient Prescriptions   Medication Sig Dispense Refill    HYDROcodone-acetaminophen (NORCO) 5-325 MG per tablet Take 1 tablet by mouth every 8 hours as needed for Pain .  84 tablet 0    doxycycline hyclate (VIBRA-TABS) 100 MG tablet TAKE 1 TABLET BY MOUTH TWICE DAILY 60 tablet 0    nicotine polacrilex (NICORETTE) 2 MG gum Take 1 each by mouth as needed for Smoking cessation 110 each 0    melatonin 5 MG TABS tablet Take 1 tablet by mouth daily 30 tablet 1    buPROPion (WELLBUTRIN XL) 150 MG extended release tablet Take 1 tablet by mouth every morning 30 tablet 3    benzonatate (TESSALON PERLES) 100 MG capsule Take 1 capsule by mouth 2 times daily as needed for Cough 20 capsule 0    fluticasone (FLONASE) 50 MCG/ACT nasal spray 2 sprays by Nasal route daily as needed for Rhinitis 1 Bottle 1    Vitamin D, Cholecalciferol, 1000 units CAPS Take 1,000 Units by mouth daily 30 capsule 3    alendronate (FOSAMAX) 70 MG tablet Take 1 tablet by mouth every 7 days 4 tablet 3    cloNIDine (CATAPRES) 0.3 MG tablet Take 1 tablet by mouth 3 times daily 90 tablet 5    bisacodyl (BISACODYL) 5 MG EC tablet Take 1 tablet by mouth 2 times daily 60 tablet 3    levofloxacin (LEVAQUIN) 250 MG tablet Take 1 tablet by mouth daily 30 tablet 6    omeprazole (PRILOSEC) 40 MG delayed release capsule Take 40 mg by mouth daily      cyclobenzaprine (FLEXERIL) 5 MG tablet Take 5 mg by mouth 3 times daily as needed for Muscle spasms      promethazine (PHENERGAN) 25 MG tablet Take 25 mg by mouth every 8 hours as needed for Nausea      calcium acetate (PHOSLO) 667 MG capsule Take by mouth 3 times daily (with meals)      minoxidil (LONITEN) 2.5 MG tablet TAKE 1 TABLET BY MOUTH TWICE DAILY 60 tablet 5    amLODIPine (NORVASC) 10 MG tablet Take 10 mg by mouth daily       No current facility-administered medications for this visit. Allergies: Iv dye [iodides]; Lisinopril; Peanut oil; Hydralazine; and Lipitor [atorvastatin]    Social History:    Social History     Social History    Marital status: Single     Spouse name: N/A    Number of children: N/A    Years of education: N/A     Social History Main Topics    Smoking status: Current Every Day Smoker     Packs/day: 0.25     Years: 54.00     Types: Cigarettes     Start date: 1/1/1962    Smokeless tobacco: Never Used      Comment: Smoking about 1 pack every 2 weeks    Alcohol use No      Comment: rare    Drug use: No    Sexual activity: Not Currently     Other Topics Concern    None     Social History Narrative     Family History   Problem Relation Age of Onset    Stroke Mother     Hypertension Mother     Diabetes Father     Diabetes Sister     Osteoarthritis Maternal Grandmother           REVIEW OF SYSTEMS:    No fever / chills / sweats. No weight loss. No visual change, eye pain, eye discharge. No oral lesion, sore throat, dysphagia. Denies cough / sputum. Denies chest pain, palpitations. Denies n / v / abd pain. No diarrhea. Denies dysuria or change in urinary function. Denies joint swelling or pain. No myalgia, arthralgia. Denies focal weakness, sensory change or other neurologic symptom  No lymph node swelling or tenderness. No symptoms endocrinopathy. No symptoms hematologic disease.   Left knee pain leg swelling     PHYSICAL EXAM:    Vitals:    /78 (Site: Right Arm, Position: Sitting, Cuff Size: Large Adult)  Pulse 67  Temp 98.6 °F (37 °C) (Oral)   Ht 5' 2\" (1.575 m)  LMP 01/21/1964  SpO2 98%  General Appearance: alert,  in no acute distress, +  pallor, no icterus skin changes from ESRD    Skin: warm and dry, no rash or erythema  Head: normocephalic and atraumatic  Eyes: pupils equal, round, and reactive to light, conjunctivae normal  ENT: tympanic membrane, external ear and ear canal normal bilaterally, nose without deformity, nasal mucosa and turbinates normal without polyps  Neck: supple and non-tender without mass, no thyromegaly or thyroid nodules, no cervical lymphadenopathy  Pulmonary/Chest: clear to auscultation bilaterally- no wheezes, rales or rhonchi, normal air movement,  Cardiovascular: normal rate, regular rhythm, normal S1 and S2, ESM+ murmurs, rubs, clicks, or gallops,   Abdomen: soft, non-tender, non-distended, normal bowel sounds, no masses or organomegaly  Extremities: no cyanosis, clubbing or edema  Musculoskeletal: normal range of motion, no joint swelling, deformity or tenderness  Neurologic: reflexes normal and symmetric, no cranial nerve deficit,  speech normal  Psych:  Orientation, sensorium, mood normal  Rt TKA incision closed and healed well  Left knee incision+         DATA:    See EPIC  crp 18.2       Labs and Radiology results pertinent to this visit have been reviewed in detail    IMPRESSION:    1. Infected prosthetic knee joint, subsequent encounter  Sedimentation Rate    C-reactive protein   2. ESRD (end stage renal disease)       Rt TKA infection complicated course s/p two stage procedure now on oral abx suppression given the infection and her history with poor wound healing and recurrent infection in the knee. She has Left KNEE done many years ago and now considering revision but given her risk for infections I advised her against this as she is quiet high risk for surgical site infection. She has several medical comorbidities    PLAN:    1. Cont oral Doxycycline x 100 mg xbid  2 She is off Levofloxacin and does not want to take it as she had foot pain and some concern with tendon problem  3. ESR, CRP   4. Follow up x 6 months           Thanks for allowing me to participate your patient's care and please call me with any questions or concerns.     Johanna Young MD  Infectious Disease  [facility]) Physician  Phone: [phone]   Fax : [phone]

## 2017-10-09 ENCOUNTER — TELEPHONE (OUTPATIENT)
Dept: INFECTIOUS DISEASES | Age: 72
End: 2017-10-09

## 2017-10-09 NOTE — TELEPHONE ENCOUNTER
Patient called back to let Dr Harshil Zurita know that she will be in Thurs morning to get her lab re-drawn, when the results come back she would like to know as far as the infection if it's gone or if she has some inflammation, etc, please advise

## 2017-10-10 ENCOUNTER — OFFICE VISIT (OUTPATIENT)
Dept: PAIN MANAGEMENT | Age: 72
End: 2017-10-10

## 2017-10-10 VITALS — HEART RATE: 74 BPM | DIASTOLIC BLOOD PRESSURE: 58 MMHG | SYSTOLIC BLOOD PRESSURE: 114 MMHG

## 2017-10-10 DIAGNOSIS — M51.37 DDD (DEGENERATIVE DISC DISEASE), LUMBOSACRAL: ICD-10-CM

## 2017-10-10 DIAGNOSIS — I73.9 PERIPHERAL VASCULAR DISEASE, UNSPECIFIED (HCC): ICD-10-CM

## 2017-10-10 DIAGNOSIS — G89.4 CHRONIC PAIN SYNDROME: Primary | ICD-10-CM

## 2017-10-10 DIAGNOSIS — E66.01 MORBID OBESITY DUE TO EXCESS CALORIES (HCC): ICD-10-CM

## 2017-10-10 DIAGNOSIS — M19.011 BILATERAL SHOULDER REGION ARTHRITIS: ICD-10-CM

## 2017-10-10 DIAGNOSIS — M19.012 BILATERAL SHOULDER REGION ARTHRITIS: ICD-10-CM

## 2017-10-10 DIAGNOSIS — M81.8 OTHER OSTEOPOROSIS WITHOUT CURRENT PATHOLOGICAL FRACTURE: ICD-10-CM

## 2017-10-10 DIAGNOSIS — M43.16 SPONDYLOLISTHESIS AT L4-L5 LEVEL: ICD-10-CM

## 2017-10-10 DIAGNOSIS — M47.817 FACET ARTHROPATHY, LUMBOSACRAL: ICD-10-CM

## 2017-10-10 DIAGNOSIS — Z96.651 STATUS POST TOTAL RIGHT KNEE REPLACEMENT: ICD-10-CM

## 2017-10-10 PROCEDURE — 99213 OFFICE O/P EST LOW 20 MIN: CPT | Performed by: NURSE PRACTITIONER

## 2017-10-10 RX ORDER — HYDROCODONE BITARTRATE AND ACETAMINOPHEN 5; 325 MG/1; MG/1
1 TABLET ORAL EVERY 8 HOURS PRN
Qty: 84 TABLET | Refills: 0 | Status: SHIPPED | OUTPATIENT
Start: 2017-10-10 | End: 2017-11-07 | Stop reason: SDUPTHER

## 2017-10-10 NOTE — PROGRESS NOTES
[facility]  1945  [id number]      HISTORY OF PRESENT ILLNESS:  Ms. Maeve Nassar is a 74 y.o. female returns for a follow up visit for pain management  She has a diagnosis of   1. Chronic pain syndrome    2. DDD (degenerative disc disease), lumbosacral    3. Facet arthropathy, lumbosacral (HCC)    4. Spondylolisthesis at L4-L5 level    5. Status post total right knee replacement    6. Bilateral shoulder region arthritis    7. Other osteoporosis without current pathological fracture    8. Morbid obesity due to excess calories ([address]    9. Peripheral vascular disease, unspecified ([facility]    . She complains of pain in the neck, shoulders, knees. She rates the pain 8/10 and describes it as aching, burning with standing, sitting, and lifting. Current treatment regimen has helped relieve about 60% of the pain. She denies any side effects from the current pain regimen. Patient reports that since the last follow up visit the physical functioning is unchanged, family/social relationships are unchanged, mood is unchanged sleep patterns are unchanged, and that the overall functioning is unchanged. Patient denies misusing/abusing her narcotic pain medications or using any illegal drugs. Patient states that pain is still bothersome in the lower back at times with increased physical activities. She reports having received a phone call from home physical therapy since, was informed that she had already had physical therapy, and she wasn't eligible for another session. She is hoping she can get an order for physical therapy to follow-[facility] had [facility]. She is feels like she is getting weaker just by sitting in the wheelchair with reduced levels of activities. She is also requesting for a back brace, she had a back brace years ago that is worn out. She denies having issues with constipation. She is tolerating activities with moderate tenderness to the lower back.  She still working on smoking SYSTEMS:    Respiratory: Negative for apnea, chest tightness and shortness of breath or change in baseline breathing. Gastrointestinal: Negative for nausea, vomiting, abdominal pain, diarrhea, constipation, blood in stool and abdominal distention. PHYSICAL EXAM:   Nursing note and vitals reviewed. BP (!) 114/58   Pulse 74   LMP 01/21/1964   Constitutional: She appears well-developed and well-nourished. No acute distress. Skin: Skin is warm and dry, good turgor. No rash noted. She is not diaphoretic. Cardiovascular: Normal rate, regular rhythm, normal heart sounds, and does not have murmur. Pulmonary/Chest: Effort normal. No respiratory distress. She does not have wheezes in the lung fields. She has no rales. Neurological/Psychiatric:She is alert and oriented to person, place, and time. Coordination is  abnormal, came in by wheelchair, ambulates with a walker at home. Her mood isAppropriate and affect is Flat/blunted . IMPRESSION:   1. Chronic pain syndrome    2. DDD (degenerative disc disease), lumbosacral    3. Facet arthropathy, lumbosacral (HCC)    4. Spondylolisthesis at L4-L5 level    5. Status post total right knee replacement    6. Bilateral shoulder region arthritis    7. Other osteoporosis without current pathological fracture    8. Morbid obesity due to excess calories ([address]    9.  Peripheral vascular disease, unspecified ([address]        PLAN:  Informed verbal consent was obtained  -Continue with Norco  -An order for physical therapy was given to the patient, material was provided on back stretches/exercises  -I strongly encouraged that patient ambulate more around the house with her walker to reduce weakness in her legs, which she reports doing  -She was advised weight reduction, diet changes- 800-1200 sierra diet, diet diary, exercising, nutritional  consult increased physical activity as tolerated  -CBT techniques- relaxation therapies such as biofeedback, mindfulness based stress CNP.    Cc: Renato Slater MD

## 2017-10-11 ENCOUNTER — TELEPHONE (OUTPATIENT)
Dept: INTERNAL MEDICINE CLINIC | Age: 72
End: 2017-10-11

## 2017-10-11 NOTE — TELEPHONE ENCOUNTER
LEFT MSG WITH  RADIOLOGY    UNSURE OF INDICATION FOR PORT PLACEMENT.  NEED TO ADDRESS WITH ORDERING PHYSICIAN

## 2017-10-12 DIAGNOSIS — Z96.659 INFECTED PROSTHETIC KNEE JOINT, SEQUELA: ICD-10-CM

## 2017-10-12 DIAGNOSIS — T84.59XS INFECTED PROSTHETIC KNEE JOINT, SEQUELA: ICD-10-CM

## 2017-10-12 LAB — SEDIMENTATION RATE, ERYTHROCYTE: 55 MM/HR (ref 0–30)

## 2017-10-13 ENCOUNTER — TELEPHONE (OUTPATIENT)
Dept: PAIN MANAGEMENT | Age: 72
End: 2017-10-13

## 2017-10-13 DIAGNOSIS — K59.00 CONSTIPATION, UNSPECIFIED CONSTIPATION TYPE: Primary | ICD-10-CM

## 2017-10-13 RX ORDER — POLYETHYLENE GLYCOL 3350 17 G/17G
17 POWDER, FOR SOLUTION ORAL DAILY
Qty: 527 G | Refills: 0 | Status: SHIPPED | OUTPATIENT
Start: 2017-10-13 | End: 2018-01-16 | Stop reason: SDUPTHER

## 2017-10-13 NOTE — TELEPHONE ENCOUNTER
Pt states she has been taking Polyethylene Glycol 3350NF 527 grams (powder for oral solution prescription laxative) for constipation and would like an rx for this sent to her [facility] on [address] [address].

## 2017-10-16 ENCOUNTER — TELEPHONE (OUTPATIENT)
Dept: INFECTIOUS DISEASES | Age: 72
End: 2017-10-16

## 2017-10-17 ENCOUNTER — TELEPHONE (OUTPATIENT)
Dept: ORTHOPEDIC SURGERY | Age: 72
End: 2017-10-17

## 2017-10-17 ENCOUNTER — OFFICE VISIT (OUTPATIENT)
Dept: ORTHOPEDIC SURGERY | Age: 72
End: 2017-10-17

## 2017-10-17 VITALS
HEIGHT: 62 IN | HEART RATE: 73 BPM | BODY MASS INDEX: 41.77 KG/M2 | WEIGHT: 227 LBS | TEMPERATURE: 96.4 F | SYSTOLIC BLOOD PRESSURE: 115 MMHG | DIASTOLIC BLOOD PRESSURE: 65 MMHG

## 2017-10-17 DIAGNOSIS — M48.061 SPINAL STENOSIS OF LUMBAR REGION, UNSPECIFIED WHETHER NEUROGENIC CLAUDICATION PRESENT: ICD-10-CM

## 2017-10-17 DIAGNOSIS — M51.37 DDD (DEGENERATIVE DISC DISEASE), LUMBOSACRAL: Primary | ICD-10-CM

## 2017-10-17 PROCEDURE — 99214 OFFICE O/P EST MOD 30 MIN: CPT | Performed by: PHYSICIAN ASSISTANT

## 2017-10-17 PROCEDURE — L0626 LO SAG RIG PNL STAYS PRE CST: HCPCS | Performed by: PHYSICIAN ASSISTANT

## 2017-10-18 ENCOUNTER — TELEPHONE (OUTPATIENT)
Dept: ORTHOPEDIC SURGERY | Age: 72
End: 2017-10-18

## 2017-10-19 PROBLEM — M48.061 SPINAL STENOSIS OF LUMBAR REGION: Status: ACTIVE | Noted: 2017-10-19

## 2017-10-19 NOTE — PROGRESS NOTES
Dr. Julius Mckeon Right 01/20/2016    Dr. Radha Hernandez - resection arthroplasty and placement of antibiotic spacer     LYMPH NODE BIOPSY      OTHER SURGICAL HISTORY Right 01/12/2016    Dr. Carol Ha - RLE angiogram prior to revision knee arthroplasty    REVISION TOTAL KNEE ARTHROPLASTY Right 06/20/2016    Dr. Radha Hernandez - I&D w/repeat Prostalac spacer implant     REVISION TOTAL KNEE ARTHROPLASTY Right 11/15/2016    Dr. Radha Hernandez - staged revision due to infection    TOTAL KNEE ARTHROPLASTY Right 2007    Dr. Ed Eugene Left 2008    Dr. Juliana Porter Not on file. Social History Main Topics    Smoking status: Current Every Day Smoker     Packs/day: 0.25     Years: 54.00     Types: Cigarettes     Start date: 1/1/1962    Smokeless tobacco: Never Used      Comment: Smoking about 1 pack every 2 weeks    Alcohol use No      Comment: rare    Drug use: No    Sexual activity: Not Currently       Current Outpatient Prescriptions   Medication Sig Dispense Refill    polyethylene glycol (MIRALAX) powder Take 17 g by mouth daily 527 g 0    HYDROcodone-acetaminophen (NORCO) 5-325 MG per tablet Take 1 tablet by mouth every 8 hours as needed for Pain .  84 tablet 0    cloNIDine (CATAPRES) 0.2 MG tablet Take 0.2 mg by mouth 3 times daily      benzonatate (TESSALON PERLES) 100 MG capsule Take 1 capsule by mouth 2 times daily as needed for Cough 20 capsule 0    albuterol sulfate HFA (PROAIR HFA) 108 (90 Base) MCG/ACT inhaler Inhale 2 puffs into the lungs 4 times daily as needed for Wheezing 1 Inhaler 0    Vitamin D, Cholecalciferol, 1000 units CAPS Take 1,000 Units by mouth daily 30 capsule 3    alendronate (FOSAMAX) 70 MG tablet Take 1 tablet by mouth every 7 days 4 tablet 3    doxycycline hyclate (VIBRA-TABS) 100 MG tablet TAKE 1 TABLET BY MOUTH TWICE DAILY 60 tablet 0    nicotine polacrilex (NICORETTE) 2 MG gum Take 1 each by mouth as needed for Smoking cessation 110 each 0    promethazine (PHENERGAN) 25 MG tablet Take 25 mg by mouth every 8 hours as needed for Nausea      calcium acetate (PHOSLO) 667 MG capsule Take by mouth 3 times daily (with meals)      minoxidil (LONITEN) 2.5 MG tablet TAKE 1 TABLET BY MOUTH TWICE DAILY 60 tablet 5    amLODIPine (NORVASC) 10 MG tablet Take 10 mg by mouth daily       No current facility-administered medications for this visit. Objective:     She is alert, oriented x 3, pleasant, well nourished, developed and in no acute distress. /65   Pulse 73   Temp 96.4 °F (35.8 °C)   Ht 5' 2\" (1.575 m)   Wt 227 lb (103 kg)   LMP 01/21/1964   BMI 41.52 kg/m²        Lumbar Spine Exam:  Deformity: Absent . Soft Tissue Swelling: Absent . Soft Tissue Tenderness: Absent . Midline Bone Tenderness:Present. Paraspinal Muscular Spasm: Present. Previous Incisions: Absent . Erythema Absent . Lumbar Flexion does produce pain. Lumbar Extension does produce pain.       Range of Motion: Lumbar spine in degrees           Flexion        Extension       Right Bend        Left Bend                                                                     Motor Exam:                                Normal=N   Weak=W   Unable=U   Toe Walk Heel Walk Single Leg Squat   Right         U           U               U   Left           U           U               U     Strength Scale: 1-5   Extensor Hallucis Longus L5 Anterior Tibialis   L4 Quadriceps   L2,3,4    Right             4            4         4   Left             4            4         4     Reflex Exam: 0-4+   Achilles Tendon (gastroc)   S1 Patellar Tendon (quads)   L4   Right                 0              0   Left                 0              0     Sensory Exam:    Special Testing: N= Negative  P= Positive   Straight leg raise Reverse straight leg raise Contralateral straight leg raise Log roll Aren test (gilbert) Babinski Bojorquez's  test   Right     N     N     N     N     N      N   Left     N     N     N     N     N      N       Gait abnormal Heel/ Toe. Unable to ambulate without the aid of a walker. Left hip exam:  There is no deformity. There is no pain with internal and external rotation. There is no pain with flexion and extension. ROM- diminished range of motion. Trochanteric region is not tender to palpation. There is no pain with weight bearing. Right hip exam:  There is no deformity. There is no pain with internal and external rotation. There is no pain with flexion and extension. ROM- diminished range of motion. Trochanteric region is not tender to palpation. There is no pain with weight bearing. Vascular exam:  Examination of bilateral lower extremities:   Pallor or Rubor: absent. Digits are warm to touch, capillary refill is less than 2 seconds. There is 2+ edema noted. Skin Exam:  Examination of the skin over both lower extremities reveals: The skin to be intact without lacerations or abrasions. No significant erythema. No rashes or skin lesions. X Rays: NOT performed in the office today:   [facility] from [facility] or an outside facility:    X-rays of the lumbar spine from she was Hospital dated 8/15/2017 reviewed. She has significant degenerative disc disease of lumbar spine. Grade 1 spondylolisthesis of L4 on L5. Complete loss of disc space at L5-S1. Additional tests reviewed:  none      Diagnosis:        ICD-10-CM ICD-9-CM    1. DDD (degenerative disc disease), lumbosacral M51.37 722. 52 MRI Lumbar Spine WO Contrast      BREG BASIC LUMBAR SUPPORT      CANCELED: OTS Quick Draw [place] Brace   2.  Spinal stenosis of lumbar region, unspecified whether neurogenic claudication present M48.061 724.02 MRI Lumbar Spine WO Contrast      BREG BASIC LUMBAR SUPPORT      CANCELED: OTS Quick Draw [place] Brace        Assessment/ Plan: The natural history of the patient's diagnosis as well as the treatment options were discussed in full and questions were answered. Risks and benefits of the treatment options also reviewed in detail. She has generalized weakness in both lower extremities. I suspect she has high-grade spinal stenosis of lumbar spine. I'm going to place her in a lumbar corset for treatment of her low back pain. I'm going to obtain an MRI of the lumbar spine without contrast to evaluate the severity of her lumbar stenosis. Procedures    [facility] BASIC LUMBAR SUPPORT     Patient was prescribed an [facility] Basic Lumber Support back brace. The lumbar spine will require stabilization / immobilization from this semi-rigid / rigid orthosis to improve their function. The orthosis will assist in protecting the affected area, provide functional support and facilitate healing. This orthosis is required for the following reasons:    Reduce pain by restricting mobility of the trunk    The patient was educated and fit by a healthcare professional with expert knowledge and specialization in brace application while under the direct supervision of the physician. Verbal and written instructions for the use of and application of this item were provided. They were instructed to contact the office immediately should the brace result in increased pain, decreased sensation, increased swelling or worsening of the condition. Follow Up: 2 weeks. Call or return to clinic prn if these symptoms worsen or fail to improve as anticipated.

## 2017-11-07 ENCOUNTER — OFFICE VISIT (OUTPATIENT)
Dept: PAIN MANAGEMENT | Age: 72
End: 2017-11-07

## 2017-11-07 VITALS — SYSTOLIC BLOOD PRESSURE: 111 MMHG | DIASTOLIC BLOOD PRESSURE: 53 MMHG | HEART RATE: 75 BPM

## 2017-11-07 DIAGNOSIS — F43.21 GRIEVING: ICD-10-CM

## 2017-11-07 DIAGNOSIS — M51.37 DDD (DEGENERATIVE DISC DISEASE), LUMBOSACRAL: ICD-10-CM

## 2017-11-07 DIAGNOSIS — M19.011 BILATERAL SHOULDER REGION ARTHRITIS: ICD-10-CM

## 2017-11-07 DIAGNOSIS — N18.6 ESRD (END STAGE RENAL DISEASE) (HCC): ICD-10-CM

## 2017-11-07 DIAGNOSIS — E66.01 MORBID OBESITY (HCC): ICD-10-CM

## 2017-11-07 DIAGNOSIS — G89.4 CHRONIC PAIN SYNDROME: Primary | ICD-10-CM

## 2017-11-07 DIAGNOSIS — M17.11 OSTEOARTHRITIS OF RIGHT KNEE, UNSPECIFIED OSTEOARTHRITIS TYPE: ICD-10-CM

## 2017-11-07 DIAGNOSIS — M47.817 FACET ARTHROPATHY, LUMBOSACRAL: ICD-10-CM

## 2017-11-07 DIAGNOSIS — M48.061 SPINAL STENOSIS OF LUMBAR REGION, UNSPECIFIED WHETHER NEUROGENIC CLAUDICATION PRESENT: ICD-10-CM

## 2017-11-07 DIAGNOSIS — Z96.651 STATUS POST TOTAL RIGHT KNEE REPLACEMENT: ICD-10-CM

## 2017-11-07 DIAGNOSIS — M43.16 SPONDYLOLISTHESIS AT L4-L5 LEVEL: ICD-10-CM

## 2017-11-07 DIAGNOSIS — M19.012 BILATERAL SHOULDER REGION ARTHRITIS: ICD-10-CM

## 2017-11-07 PROCEDURE — 99213 OFFICE O/P EST LOW 20 MIN: CPT | Performed by: NURSE PRACTITIONER

## 2017-11-07 RX ORDER — HYDROCODONE BITARTRATE AND ACETAMINOPHEN 5; 325 MG/1; MG/1
1 TABLET ORAL EVERY 8 HOURS PRN
Qty: 84 TABLET | Refills: 0 | Status: SHIPPED | OUTPATIENT
Start: 2017-11-07 | End: 2017-12-05 | Stop reason: SDUPTHER

## 2017-11-07 RX ORDER — DULOXETIN HYDROCHLORIDE 30 MG/1
30 CAPSULE, DELAYED RELEASE ORAL DAILY
Qty: 30 CAPSULE | Refills: 0 | Status: SHIPPED | OUTPATIENT
Start: 2017-11-07 | End: 2017-12-05 | Stop reason: SDUPTHER

## 2017-11-07 NOTE — PROGRESS NOTES
Marleni Baxter  1945  [id number]      HISTORY OF PRESENT ILLNESS:  Ms. Alyssa Curtis is a 74 y.o. female returns for a follow up visit for pain management  She has a diagnosis of   1. Chronic pain syndrome    2. DDD (degenerative disc disease), lumbosacral    3. Spinal stenosis of lumbar region, unspecified whether neurogenic claudication present    4. Facet arthropathy, lumbosacral    5. Spondylolisthesis at L4-L5 level    6. Bilateral shoulder region arthritis    7. Osteoarthritis of right knee, unspecified osteoarthritis type    8. ESRD (end stage renal disease)    9. Status post total right knee replacement    10. Morbid obesity ([address]    11. Grieving    . She complains of pain in the shoulders, lower back, knees. She rates the pain 8/10 and describes it as aching, burning with nothing, and [id number] Current treatment regimen has helped relieve about 60% of the pain. She denies any side effects from the current pain regimen. Patient reports that since the last follow up visit the physical functioning is unchanged, family/social relationships are unchanged, mood is unchanged sleep patterns are unchanged, and that the overall functioning is unchanged. Patient denies misusing/abusing her narcotic pain medications or using any illegal drugs. Patient states that pain is bothersome to the joints, and thighs. She is wondering if she can have the sample the topical Voltaren gel because she cannot afford the co-pay of $40 from the pharmacy. Her mood is stable without anxiety. Holiday seasons seemed to be difficult for her due to grieving because her daughter who passed away about a year ago used to love the holiday season. Her other daughter doesn't seem to be as involved in her life. Sleep is otherwise fair with an average of 5-6 hours. She denies having issues with constipation. She maintains compliance with dialysis therapy.  She tolerates house chores/home activities with moderate tenderness to the lower back and legs. ALLERGIES: Patients list of allergies were reviewed     MEDICATIONS: Ms. Nisa Marroquin list of medications were reviewed. Her current medications are   Outpatient Medications Prior to Visit   Medication Sig Dispense Refill    polyethylene glycol (MIRALAX) powder Take 17 g by mouth daily 527 g 0    cloNIDine (CATAPRES) 0.2 MG tablet Take 0.2 mg by mouth 3 times daily      benzonatate (TESSALON PERLES) 100 MG capsule Take 1 capsule by mouth 2 times daily as needed for Cough 20 capsule 0    albuterol sulfate HFA (PROAIR HFA) 108 (90 Base) MCG/ACT inhaler Inhale 2 puffs into the lungs 4 times daily as needed for Wheezing 1 Inhaler 0    Vitamin D, Cholecalciferol, 1000 units CAPS Take 1,000 Units by mouth daily 30 capsule 3    alendronate (FOSAMAX) 70 MG tablet Take 1 tablet by mouth every 7 days 4 tablet 3    doxycycline hyclate (VIBRA-TABS) 100 MG tablet TAKE 1 TABLET BY MOUTH TWICE DAILY 60 tablet 0    nicotine polacrilex (NICORETTE) 2 MG gum Take 1 each by mouth as needed for Smoking cessation 110 each 0    promethazine (PHENERGAN) 25 MG tablet Take 25 mg by mouth every 8 hours as needed for Nausea      calcium acetate (PHOSLO) 667 MG capsule Take by mouth 3 times daily (with meals)      minoxidil (LONITEN) 2.5 MG tablet TAKE 1 TABLET BY MOUTH TWICE DAILY 60 tablet 5    amLODIPine (NORVASC) 10 MG tablet Take 10 mg by mouth daily      HYDROcodone-acetaminophen (NORCO) 5-325 MG per tablet Take 1 tablet by mouth every 8 hours as needed for Pain . 84 tablet 0     No facility-administered medications prior to visit. SOCIAL/FAMILY/PAST MEDICAL HISTORY: Ms. Magdaleno Orona, family and past medical history was reviewed. REVIEW OF SYSTEMS:    Respiratory: Negative for apnea, chest tightness and shortness of breath or change in baseline breathing. Gastrointestinal: Negative for nausea, vomiting, abdominal pain, diarrhea, constipation, blood in stool and abdominal distention. was advised weight reduction, diet changes- 800-1200 sierra diet, diet diary, exercising, nutritional  consult increased physical activity as tolerated  -CBT techniques- relaxation therapies such as biofeedback, mindfulness based stress reduction, imagery, cognitive restructuring, problem solving discussed with patient  -Last UDS inconsistent  -Return in about 4 weeks (around 12/5/2017). -[place] record was obtained and reviewed  for the last one year and no indicators of drug misuse  were found. Any other controlled substance prescriptions  seen on the record have been accounted for, I am aware of the patient receiving these medications. Nnamdi Magallon [place] record will be rechecked as part of office protocol. Current Outpatient Prescriptions   Medication Sig Dispense Refill    HYDROcodone-acetaminophen (NORCO) 5-325 MG per tablet Take 1 tablet by mouth every 8 hours as needed for Pain .  84 tablet 0    DULoxetine (CYMBALTA) 30 MG extended release capsule Take 1 capsule by mouth daily 30 capsule 0    polyethylene glycol (MIRALAX) powder Take 17 g by mouth daily 527 g 0    cloNIDine (CATAPRES) 0.2 MG tablet Take 0.2 mg by mouth 3 times daily      benzonatate (TESSALON PERLES) 100 MG capsule Take 1 capsule by mouth 2 times daily as needed for Cough 20 capsule 0    albuterol sulfate HFA (PROAIR HFA) 108 (90 Base) MCG/ACT inhaler Inhale 2 puffs into the lungs 4 times daily as needed for Wheezing 1 Inhaler 0    Vitamin D, Cholecalciferol, 1000 units CAPS Take 1,000 Units by mouth daily 30 capsule 3    alendronate (FOSAMAX) 70 MG tablet Take 1 tablet by mouth every 7 days 4 tablet 3    doxycycline hyclate (VIBRA-TABS) 100 MG tablet TAKE 1 TABLET BY MOUTH TWICE DAILY 60 tablet 0    nicotine polacrilex (NICORETTE) 2 MG gum Take 1 each by mouth as needed for Smoking cessation 110 each 0    promethazine (PHENERGAN) 25 MG tablet Take 25 mg by mouth every 8 hours as needed for Nausea      calcium acetate (PHOSLO) 667 MG capsule Take by mouth 3 times daily (with meals)      minoxidil (LONITEN) 2.5 MG tablet TAKE 1 TABLET BY MOUTH TWICE DAILY 60 tablet 5    amLODIPine (NORVASC) 10 MG tablet Take 10 mg by mouth daily       No current facility-administered medications for this visit. I will continue her current medication regimen  which is part of the above treatment schedule. It has been helping with Ms. Monreal's chronic  medical problems which for this visit include: The primary encounter diagnosis was Chronic pain syndrome. Diagnoses of DDD (degenerative disc disease), lumbosacral, Spinal stenosis of lumbar region, unspecified whether neurogenic claudication present, Facet arthropathy, lumbosacral, Spondylolisthesis at L4-L5 level, Bilateral shoulder region arthritis, Osteoarthritis of right knee, unspecified osteoarthritis type, ESRD (end stage renal disease), Status post total right knee replacement, Morbid obesity ([address] and Grieving were also pertinent to this visit. Risks and benefits of the medications and other alternative treatments  including no treatment were discussed with the patient. The common side effects of these medications were also explained to the patient. Informed verbal consent was obtained. Goals of current treatment regimen include improvement in pain, restoration of functioning- with focus on improvement in physical performance, general activity, work or disability,emotional distress, health care utilization and  decreased medication consumption. Will continue to monitor progress towards achieving/maintaining therapeutic goals with special emphasis on  1. Improvement in perceived interfernce  of pain with ADL's. Ability to do home exercises independently. Ability to do household chores indoor and/or outdoor work and social and leisure activities. Improve psychosocial and physical functioning. - she is showing progression towards this treatment goal with the current regimen.     She was advised against drinking alcohol with the narcotic pain medicines, advised against driving or handling machinery while adjusting the dose of medicines or if having cognitive  issues related to the current medications. Risk of overdose and death, if medicines not taken as prescribed, were also discussed. If the patient develops new symptoms or if the symptoms worsen, the patient should call the office. While transcribing every attempt was made to maintain the accuracy of the note in terms of it's contents,there may have been some errors made inadvertently. Thank you for allowing me to participate in the care of this patient. Chris Roy CNP.     Cc: Corinne Taveras MD

## 2017-11-09 ENCOUNTER — HOSPITAL ENCOUNTER (OUTPATIENT)
Dept: MRI IMAGING | Age: 72
Discharge: HOME | End: 2017-11-09
Attending: PHYSICIAN ASSISTANT | Admitting: PHYSICIAN ASSISTANT

## 2017-11-09 DIAGNOSIS — M48.061 SPINAL STENOSIS OF LUMBAR REGION, UNSPECIFIED WHETHER NEUROGENIC CLAUDICATION PRESENT: ICD-10-CM

## 2017-11-09 DIAGNOSIS — M51.37 DDD (DEGENERATIVE DISC DISEASE), LUMBOSACRAL: ICD-10-CM

## 2017-11-09 DIAGNOSIS — M51.37 OTHER INTERVERTEBRAL DISC DEGENERATION, LUMBOSACRAL REGION: ICD-10-CM

## 2017-11-24 RX ORDER — DOXYCYCLINE HYCLATE 100 MG
TABLET ORAL
Qty: 60 TABLET | Refills: 0 | Status: SHIPPED | OUTPATIENT
Start: 2017-11-24 | End: 2019-02-05 | Stop reason: SDUPTHER

## 2017-12-05 ENCOUNTER — OFFICE VISIT (OUTPATIENT)
Dept: PAIN MANAGEMENT | Age: 72
End: 2017-12-05

## 2017-12-05 VITALS — HEART RATE: 74 BPM | DIASTOLIC BLOOD PRESSURE: 66 MMHG | OXYGEN SATURATION: 96 % | SYSTOLIC BLOOD PRESSURE: 113 MMHG

## 2017-12-05 DIAGNOSIS — M43.16 SPONDYLOLISTHESIS AT L4-L5 LEVEL: ICD-10-CM

## 2017-12-05 DIAGNOSIS — M19.011 BILATERAL SHOULDER REGION ARTHRITIS: ICD-10-CM

## 2017-12-05 DIAGNOSIS — M47.817 FACET ARTHROPATHY, LUMBOSACRAL: ICD-10-CM

## 2017-12-05 DIAGNOSIS — M17.11 OSTEOARTHRITIS OF RIGHT KNEE, UNSPECIFIED OSTEOARTHRITIS TYPE: ICD-10-CM

## 2017-12-05 DIAGNOSIS — M19.012 BILATERAL SHOULDER REGION ARTHRITIS: ICD-10-CM

## 2017-12-05 DIAGNOSIS — E66.01 MORBID OBESITY (HCC): ICD-10-CM

## 2017-12-05 DIAGNOSIS — G89.4 CHRONIC PAIN SYNDROME: Primary | ICD-10-CM

## 2017-12-05 DIAGNOSIS — M48.061 SPINAL STENOSIS OF LUMBAR REGION, UNSPECIFIED WHETHER NEUROGENIC CLAUDICATION PRESENT: ICD-10-CM

## 2017-12-05 DIAGNOSIS — M51.37 DDD (DEGENERATIVE DISC DISEASE), LUMBOSACRAL: ICD-10-CM

## 2017-12-05 PROCEDURE — 99213 OFFICE O/P EST LOW 20 MIN: CPT | Performed by: NURSE PRACTITIONER

## 2017-12-05 RX ORDER — HYDROCODONE BITARTRATE AND ACETAMINOPHEN 5; 325 MG/1; MG/1
1 TABLET ORAL EVERY 8 HOURS PRN
Qty: 84 TABLET | Refills: 0 | Status: SHIPPED | OUTPATIENT
Start: 2017-12-05 | End: 2018-01-02 | Stop reason: SDUPTHER

## 2017-12-05 RX ORDER — DULOXETIN HYDROCHLORIDE 30 MG/1
30 CAPSULE, DELAYED RELEASE ORAL DAILY
Qty: 30 CAPSULE | Refills: 0 | Status: SHIPPED | OUTPATIENT
Start: 2017-12-05 | End: 2018-01-02 | Stop reason: SDUPTHER

## 2017-12-05 NOTE — PATIENT INSTRUCTIONS
Patient Education        Knee Arthritis: Exercises  Your Care Instructions  Here are some examples of exercises for knee arthritis. Start each exercise slowly. Ease off the exercise if you start to have pain. Your doctor or physical therapist will tell you when you can start these exercises and which ones will work best for you. How to do the exercises  Knee flexion with heel slide    1. Lie on your back with your knees bent. 2. Slide your heel back by bending your affected knee as far as you can. Then hook your other foot around your ankle to help pull your heel even farther back. 3. Hold for about 6 seconds, then rest for up to 10 seconds. 4. Repeat 8 to 12 times. 5. Switch legs and repeat steps 1 through 4, even if only one knee is sore. Quad sets    1. Sit with your affected leg straight and supported on the floor or a firm bed. Place a small, rolled-up towel under your knee. Your other leg should be bent, with that foot flat on the floor. 2. Tighten the thigh muscles of your affected leg by pressing the back of your knee down into the towel. 3. Hold for about 6 seconds, then rest for up to 10 seconds. 4. Repeat 8 to 12 times. 5. Switch legs and repeat steps 1 through 4, even if only one knee is sore. Straight-leg raises to the front    1. Lie on your back with your good knee bent so that your foot rests flat on the floor. Your affected leg should be straight. Make sure that your low back has a normal curve. You should be able to slip your hand in between the floor and the small of your back, with your palm touching the floor and your back touching the back of your hand. 2. Tighten the thigh muscles in your affected leg by pressing the back of your knee flat down to the floor. Hold your knee straight. 3. Keeping the thigh muscles tight and your leg straight, lift your affected leg up so that your heel is about 12 inches off the floor. Hold for about 6 seconds, then lower slowly.   4. Relax for up make and go to all appointments, and call your doctor if you are having problems. It's also a good idea to know your test results and keep a list of the medicines you take. Where can you learn more? Go to https://reshma.[email or web address] org and sign in to your [employer] account. Enter C159 in the [facility] box to learn more about \"Knee Arthritis: Exercises. \"     If you do not have an account, please click on the \"Sign Up Now\" link. Current as of: March 21, 2017  Content Version: 11.3  © 8689-5444 [employer]. Care instructions adapted under license by [facility] [facility] ([facility]). If you have questions about a medical condition or this instruction, always ask your healthcare professional. [address] any warranty or liability for your use of this information. Patient Education        Back Stretches: Exercises  Your Care Instructions  Here are some examples of exercises for stretching your back. Start each exercise slowly. Ease off the exercise if you start to have pain. Your doctor or physical therapist will tell you when you can start these exercises and which ones will work best for you. How to do the exercises  Overhead stretch    6. Stand comfortably with your feet shoulder-width apart. 7. Looking straight ahead, raise both arms over your head and reach toward the ceiling. Do not allow your head to tilt back. 8. Hold for 15 to 30 seconds, then lower your arms to your sides. 9. Repeat 2 to 4 times. Side stretch    6. Stand comfortably with your feet shoulder-width apart. 7. Raise one arm over your head, and then lean to the other side. 8. Slide your hand down your leg as you let the weight of your arm gently stretch your side muscles. Hold for 15 to 30 seconds. 9. Repeat 2 to 4 times on each side. Press-up    7. Lie on your stomach, supporting your body with your forearms. 8. Press your elbows down into the floor to raise your upper back.  As you do this, relax your stomach muscles and allow your back to arch without using your back muscles. As your press up, do not let your hips or pelvis come off the floor. 9. Hold for 15 to 30 seconds, then relax. 10. Repeat 2 to 4 times. Relax and rest    6. Lie on your back with a rolled towel under your neck and a pillow under your knees. Extend your arms comfortably to your sides. 7. Relax and breathe normally. 8. Remain in this position for about 10 minutes. 9. If you can, do this 2 or 3 times each day. Follow-up care is a key part of your treatment and safety. Be sure to make and go to all appointments, and call your doctor if you are having problems. It's also a good idea to know your test results and keep a list of the medicines you take. Where can you learn more? Go to [email or web address].[email or web address].[email or web address] org and sign in to your [employer] account. Enter F549 in the [employer] box to learn more about \"Back Stretches: Exercises. \"     If you do not have an account, please click on the \"Sign Up Now\" link. Current as of: March 21, 2017  Content Version: 11.3  © 8059-2881 [employer]. Care instructions adapted under license by [facility] [employer] [facility] ([facility]). If you have questions about a medical condition or this instruction, always ask your healthcare professional. [address]  any warranty or liability for your use of this information.

## 2017-12-06 NOTE — PROGRESS NOTES
Refill    doxycycline hyclate (VIBRA-TABS) 100 MG tablet TAKE 1 TABLET BY MOUTH TWICE DAILY 60 tablet 0    polyethylene glycol (MIRALAX) powder Take 17 g by mouth daily 527 g 0    cloNIDine (CATAPRES) 0.2 MG tablet Take 0.2 mg by mouth 3 times daily      benzonatate (TESSALON PERLES) 100 MG capsule Take 1 capsule by mouth 2 times daily as needed for Cough 20 capsule 0    albuterol sulfate HFA (PROAIR HFA) 108 (90 Base) MCG/ACT inhaler Inhale 2 puffs into the lungs 4 times daily as needed for Wheezing 1 Inhaler 0    Vitamin D, Cholecalciferol, 1000 units CAPS Take 1,000 Units by mouth daily 30 capsule 3    alendronate (FOSAMAX) 70 MG tablet Take 1 tablet by mouth every 7 days 4 tablet 3    doxycycline hyclate (VIBRA-TABS) 100 MG tablet TAKE 1 TABLET BY MOUTH TWICE DAILY 60 tablet 0    nicotine polacrilex (NICORETTE) 2 MG gum Take 1 each by mouth as needed for Smoking cessation 110 each 0    promethazine (PHENERGAN) 25 MG tablet Take 25 mg by mouth every 8 hours as needed for Nausea      calcium acetate (PHOSLO) 667 MG capsule Take by mouth 3 times daily (with meals)      minoxidil (LONITEN) 2.5 MG tablet TAKE 1 TABLET BY MOUTH TWICE DAILY 60 tablet 5    amLODIPine (NORVASC) 10 MG tablet Take 10 mg by mouth daily      HYDROcodone-acetaminophen (NORCO) 5-325 MG per tablet Take 1 tablet by mouth every 8 hours as needed for Pain . 84 tablet 0    DULoxetine (CYMBALTA) 30 MG extended release capsule Take 1 capsule by mouth daily 30 capsule 0     No facility-administered medications prior to visit. SOCIAL/FAMILY/PAST MEDICAL HISTORY: Ms. Avril Naqvi, family and past medical history was reviewed. REVIEW OF SYSTEMS:    Respiratory: Negative for apnea, chest tightness and shortness of breath or change in baseline breathing. Gastrointestinal: Negative for nausea, vomiting, abdominal pain, diarrhea, constipation, blood in stool and abdominal distention.        PHYSICAL EXAM:   Nursing note and vitals reviewed. /66   Pulse 74   LMP 01/21/1964   SpO2 96%   Breastfeeding? No   Constitutional: She appears well-developed and well-nourished. No acute distress. Skin: Skin is warm and dry, good turgor. No rash noted. She is not diaphoretic. Cardiovascular: Normal rate, regular rhythm, normal heart sounds, and does not have murmur. Pulmonary/Chest: Effort normal. No respiratory distress. She does not have wheezes in the lung fields. She has no rales. Neurological/Psychiatric:She is alert and oriented to person, place, and time. Coordination is  normal, patient came in by w/c. Her mood isAppropriate and affect is Flat/blunted . IMPRESSION:   1. Chronic pain syndrome    2. Osteoarthritis of right knee, unspecified osteoarthritis type    3. Bilateral shoulder region arthritis    4. DDD (degenerative disc disease), lumbosacral    5. Spinal stenosis of lumbar region, unspecified whether neurogenic claudication present    6. Facet arthropathy, lumbosacral    7. Spondylolisthesis at L4-L5 level    8. Morbid obesity ([address]        PLAN:  Informed verbal consent was obtained  -Continue with Norco, Cymbalta, Voltaren gel, MiraLAX  -No Norco was prescribed for her today, patient states she has enough at home. Recommended that she bring all her pill bottles to the next visit  -I have concerns that despite mutliple c/o of pain to the joints, patients may not be taking her pain medications as prescribed, will continue to monitor her closely  -She was hoping for home physical therapy, as the last company declined to give her physical therapy after completing the last session.  Home PT will be re-ordered for her  -Education provided on an ILIANA's, patient declined any injections or invasive procedures at this point  -She was advised weight reduction, diet changes- 800-1200 sierra diet, diet diary, exercising, nutritional  consult increased physical activity as tolerated  -Last UDS consistent  -Return in about 4 weeks (around 1/2/2018). Current Outpatient Prescriptions   Medication Sig Dispense Refill    HYDROcodone-acetaminophen (NORCO) 5-325 MG per tablet Take 1 tablet by mouth every 8 hours as needed for Pain . 84 tablet 0    DULoxetine (CYMBALTA) 30 MG extended release capsule Take 1 capsule by mouth daily 30 capsule 0    diclofenac sodium (VOLTAREN) 1 % GEL Apply 2-4 g topically 2 times daily 5 Tube 0    doxycycline hyclate (VIBRA-TABS) 100 MG tablet TAKE 1 TABLET BY MOUTH TWICE DAILY 60 tablet 0    polyethylene glycol (MIRALAX) powder Take 17 g by mouth daily 527 g 0    cloNIDine (CATAPRES) 0.2 MG tablet Take 0.2 mg by mouth 3 times daily      benzonatate (TESSALON PERLES) 100 MG capsule Take 1 capsule by mouth 2 times daily as needed for Cough 20 capsule 0    albuterol sulfate HFA (PROAIR HFA) 108 (90 Base) MCG/ACT inhaler Inhale 2 puffs into the lungs 4 times daily as needed for Wheezing 1 Inhaler 0    Vitamin D, Cholecalciferol, 1000 units CAPS Take 1,000 Units by mouth daily 30 capsule 3    alendronate (FOSAMAX) 70 MG tablet Take 1 tablet by mouth every 7 days 4 tablet 3    doxycycline hyclate (VIBRA-TABS) 100 MG tablet TAKE 1 TABLET BY MOUTH TWICE DAILY 60 tablet 0    nicotine polacrilex (NICORETTE) 2 MG gum Take 1 each by mouth as needed for Smoking cessation 110 each 0    promethazine (PHENERGAN) 25 MG tablet Take 25 mg by mouth every 8 hours as needed for Nausea      calcium acetate (PHOSLO) 667 MG capsule Take by mouth 3 times daily (with meals)      minoxidil (LONITEN) 2.5 MG tablet TAKE 1 TABLET BY MOUTH TWICE DAILY 60 tablet 5    amLODIPine (NORVASC) 10 MG tablet Take 10 mg by mouth daily       No current facility-administered medications for this visit. I will continue her current medication regimen  which is part of the above treatment schedule. It has been helping with Ms. Monreal's chronic  medical problems which for this visit include:    The primary encounter diagnosis was Chronic pain syndrome. Diagnoses of Osteoarthritis of right knee, unspecified osteoarthritis type, Bilateral shoulder region arthritis, DDD (degenerative disc disease), lumbosacral, Spinal stenosis of lumbar region, unspecified whether neurogenic claudication present, Facet arthropathy, lumbosacral, Spondylolisthesis at L4-L5 level, and Morbid obesity ([address] were also pertinent to this visit. Risks and benefits of the medications and other alternative treatments  including no treatment were discussed with the patient. The common side effects of these medications were also explained to the patient. Informed verbal consent was obtained. Goals of current treatment regimen include improvement in pain, restoration of functioning- with focus on improvement in physical performance, general activity, work or disability,emotional distress, health care utilization and  decreased medication consumption. Will continue to monitor progress towards achieving/maintaining therapeutic goals with special emphasis on  1. Improvement in perceived interfernce  of pain with ADL's. Ability to do home exercises independently. Ability to do household chores indoor and/or outdoor work and social and leisure activities. Improve psychosocial and physical functioning. - she is not showing any significant progress/or showing regression  towards this goal and reassessment and adjustment of goals/treatment have been made. She was advised against drinking alcohol with the narcotic pain medicines, advised against driving or handling machinery while adjusting the dose of medicines or if having cognitive  issues related to the current medications. Risk of overdose and death, if medicines not taken as prescribed, were also discussed. If the patient develops new symptoms or if the symptoms worsen, the patient should call the office.     While transcribing every attempt was made to maintain the accuracy of the note in terms of it's contents,there may

## 2017-12-12 RX ORDER — DOXYCYCLINE HYCLATE 100 MG
TABLET ORAL
Qty: 60 TABLET | Refills: 0 | Status: CANCELLED | OUTPATIENT
Start: 2017-12-12

## 2017-12-12 NOTE — TELEPHONE ENCOUNTER
Dr please review medication she called in today and asked for refill on doxy I pend it for you to sign    Thank you

## 2018-01-02 ENCOUNTER — OFFICE VISIT (OUTPATIENT)
Dept: PAIN MANAGEMENT | Age: 73
End: 2018-01-02

## 2018-01-02 VITALS — DIASTOLIC BLOOD PRESSURE: 56 MMHG | OXYGEN SATURATION: 97 % | HEART RATE: 74 BPM | SYSTOLIC BLOOD PRESSURE: 117 MMHG

## 2018-01-02 DIAGNOSIS — M47.817 FACET ARTHROPATHY, LUMBOSACRAL: ICD-10-CM

## 2018-01-02 DIAGNOSIS — M43.16 SPONDYLOLISTHESIS AT L4-L5 LEVEL: ICD-10-CM

## 2018-01-02 DIAGNOSIS — M19.012 BILATERAL SHOULDER REGION ARTHRITIS: ICD-10-CM

## 2018-01-02 DIAGNOSIS — M48.061 SPINAL STENOSIS OF LUMBAR REGION, UNSPECIFIED WHETHER NEUROGENIC CLAUDICATION PRESENT: ICD-10-CM

## 2018-01-02 DIAGNOSIS — M51.37 DDD (DEGENERATIVE DISC DISEASE), LUMBOSACRAL: ICD-10-CM

## 2018-01-02 DIAGNOSIS — G89.4 CHRONIC PAIN SYNDROME: Primary | ICD-10-CM

## 2018-01-02 DIAGNOSIS — F32.A DEPRESSION, UNSPECIFIED DEPRESSION TYPE: ICD-10-CM

## 2018-01-02 DIAGNOSIS — Z87.39 HISTORY OF INFECTION OF TOTAL JOINT PROSTHESIS OF KNEE: ICD-10-CM

## 2018-01-02 DIAGNOSIS — E66.01 MORBID OBESITY (HCC): ICD-10-CM

## 2018-01-02 DIAGNOSIS — M19.011 BILATERAL SHOULDER REGION ARTHRITIS: ICD-10-CM

## 2018-01-02 DIAGNOSIS — M17.11 OSTEOARTHRITIS OF RIGHT KNEE, UNSPECIFIED OSTEOARTHRITIS TYPE: ICD-10-CM

## 2018-01-02 PROCEDURE — 99213 OFFICE O/P EST LOW 20 MIN: CPT | Performed by: NURSE PRACTITIONER

## 2018-01-02 RX ORDER — HYDROCODONE BITARTRATE AND ACETAMINOPHEN 5; 325 MG/1; MG/1
1 TABLET ORAL 2 TIMES DAILY PRN
Qty: 60 TABLET | Refills: 0 | Status: SHIPPED | OUTPATIENT
Start: 2018-01-02 | End: 2018-01-30 | Stop reason: SDUPTHER

## 2018-01-02 RX ORDER — HYDROCODONE BITARTRATE AND ACETAMINOPHEN 5; 325 MG/1; MG/1
1 TABLET ORAL EVERY 8 HOURS PRN
Qty: 30 TABLET | Refills: 0 | Status: SHIPPED | OUTPATIENT
Start: 2018-01-02 | End: 2018-01-02 | Stop reason: SDUPTHER

## 2018-01-02 RX ORDER — DULOXETIN HYDROCHLORIDE 30 MG/1
30 CAPSULE, DELAYED RELEASE ORAL DAILY
Qty: 30 CAPSULE | Refills: 0 | Status: SHIPPED | OUTPATIENT
Start: 2018-01-02 | End: 2018-01-30 | Stop reason: SDUPTHER

## 2018-01-02 NOTE — PROGRESS NOTES
Jessica Abreu  1945  [id number]      HISTORY OF PRESENT ILLNESS: Ms. Scar Sow is a 74 y.o. female returns for a follow up visit for pain management  She has a diagnosis of   1. Chronic pain syndrome    2. DDD (degenerative disc disease), lumbosacral    3. Facet arthropathy, lumbosacral    4. Spinal stenosis of lumbar region, unspecified whether neurogenic claudication present    5. Spondylolisthesis at L4-L5 level    6. Bilateral shoulder region arthritis    7. Osteoarthritis of right knee, unspecified osteoarthritis type    8. 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 Depression, unspecified depression type    10. Morbid obesity ([address]    . As per Information Obtained from the PADT (Patient Assessment and Documentation Tool)    She complains of pain in the shoulders, lower back. She rates the pain 7/10 and describes it as aching, burning with nothing, and lifting. Current treatment regimen has helped relieve about 60% of the pain. She denies any side effects from the current pain regimen. Patient reports that since the last follow up visit the physical functioning is unchanged, family/social relationships are unchanged, mood is unchanged sleep patterns are unchanged, and that the overall functioning is unchanged. Patient denies misusing/abusing her narcotic pain medications or using any illegal drugs. Upon obtaining medical history from Ms. Scar Sow states that pain is manageable on current pain therapy. Mood is stable without anxiety. Sleep is fair with an average of 5 hours. She continues to experience tenderness to both shoulders and knees, and is under the care of orthopedics. She has an appointment with I&D tomorrow for right knee pain. She is hoping to get clearance for surgery either to the knees or her shoulder secondary to arthritis. Denies having issues of constipation.  She tried calling for physical therapy, but was informed that she had right he had completed facility-administered medications prior to visit. SOCIAL/FAMILY/PAST MEDICAL HISTORY: Ms. Noel Mcmahan, family and past medical history was reviewed. REVIEW OF SYSTEMS:    Respiratory: Negative for apnea, chest tightness and shortness of breath or change in baseline breathing. Gastrointestinal: Negative for nausea, vomiting, abdominal pain, diarrhea, constipation, blood in stool and abdominal distention. PHYSICAL EXAM:   Nursing note and vitals reviewed. BP (!) 117/56   Pulse 74   LMP 01/21/1964   SpO2 97%   Breastfeeding? No   Constitutional: She appears well-developed and well-nourished. No acute distress. Skin: Skin is warm and dry, good turgor. No rash noted. She is not diaphoretic. Cardiovascular: Normal rate, regular rhythm, normal heart sounds, and does not have murmur. Pulmonary/Chest: Effort normal. No respiratory distress. She does not have wheezes in the lung fields. She has no rales. Neurological/Psychiatric:She is alert and oriented to person, place, and time. Coordination is  Poor, she came in by wheel chair. Her mood isAppropriate and affect is Flat/blunted . IMPRESSION:   1. Chronic pain syndrome    2. DDD (degenerative disc disease), lumbosacral    3. Facet arthropathy, lumbosacral    4. Spinal stenosis of lumbar region, unspecified whether neurogenic claudication present    5. Spondylolisthesis at L4-L5 level    6. Bilateral shoulder region arthritis    7. Osteoarthritis of right knee, unspecified osteoarthritis type    8. 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 Depression, unspecified depression type    10. Morbid obesity ([facility]        PLAN:  Informed verbal consent was obtained  -Continue with Cymbalta, Voltaren gel, Norco  -An order for physical therapy was provided to the patient, I recommended that she attend physical therapy at a facility.  Patient was okay with going to a facility for physical therapy  -Continue to f/u with Orthopedics, I&D   -Coaching completed on failed UDS negative of hydrocodone. Patient reports taking hydrocodone as prescribed, and is unsure why it did not show in her specimen. Another UDS will be completed today, adjustment will be made accordingly based on UDS  -She was advised weight reduction, diet changes- 800-1200 sierra diet, diet diary, exercising, nutritional  consult increased physical activity as tolerated  -CBT techniques- relaxation therapies such as biofeedback, mindfulness based stress reduction, imagery, cognitive restructuring, problem solving discussed with patient  -Last UDS consistent  -Return in about 4 weeks (around 1/30/2018). Current Outpatient Prescriptions   Medication Sig Dispense Refill    DULoxetine (CYMBALTA) 30 MG extended release capsule Take 1 capsule by mouth daily 30 capsule 0    diclofenac sodium (VOLTAREN) 1 % GEL Apply 2-4 g topically 2 times daily 5 Tube 0    HYDROcodone-acetaminophen (NORCO) 5-325 MG per tablet Take 1 tablet by mouth 2 times daily as needed for Pain for up to 28 days.  60 tablet 0    doxycycline hyclate (VIBRA-TABS) 100 MG tablet TAKE 1 TABLET BY MOUTH TWICE DAILY 60 tablet 0    polyethylene glycol (MIRALAX) powder Take 17 g by mouth daily 527 g 0    cloNIDine (CATAPRES) 0.2 MG tablet Take 0.2 mg by mouth 3 times daily      benzonatate (TESSALON PERLES) 100 MG capsule Take 1 capsule by mouth 2 times daily as needed for Cough 20 capsule 0    albuterol sulfate HFA (PROAIR HFA) 108 (90 Base) MCG/ACT inhaler Inhale 2 puffs into the lungs 4 times daily as needed for Wheezing 1 Inhaler 0    Vitamin D, Cholecalciferol, 1000 units CAPS Take 1,000 Units by mouth daily 30 capsule 3    alendronate (FOSAMAX) 70 MG tablet Take 1 tablet by mouth every 7 days 4 tablet 3    doxycycline hyclate (VIBRA-TABS) 100 MG tablet TAKE 1 TABLET BY MOUTH TWICE DAILY 60 tablet 0    nicotine polacrilex (NICORETTE) 2 MG gum Take 1 each by mouth as needed for Smoking cessation

## 2018-01-02 NOTE — PATIENT INSTRUCTIONS
Patient Education        Knee Arthritis: Exercises  Your Care Instructions  Here are some examples of exercises for knee arthritis. Start each exercise slowly. Ease off the exercise if you start to have pain. Your doctor or physical therapist will tell you when you can start these exercises and which ones will work best for you. How to do the exercises  Knee flexion with heel slide    1. Lie on your back with your knees bent. 2. Slide your heel back by bending your affected knee as far as you can. Then hook your other foot around your ankle to help pull your heel even farther back. 3. Hold for about 6 seconds, then rest for up to 10 seconds. 4. Repeat 8 to 12 times. 5. Switch legs and repeat steps 1 through 4, even if only one knee is sore. Quad sets    1. Sit with your affected leg straight and supported on the floor or a firm bed. Place a small, rolled-up towel under your knee. Your other leg should be bent, with that foot flat on the floor. 2. Tighten the thigh muscles of your affected leg by pressing the back of your knee down into the towel. 3. Hold for about 6 seconds, then rest for up to 10 seconds. 4. Repeat 8 to 12 times. 5. Switch legs and repeat steps 1 through 4, even if only one knee is sore. Straight-leg raises to the front    1. Lie on your back with your good knee bent so that your foot rests flat on the floor. Your affected leg should be straight. Make sure that your low back has a normal curve. You should be able to slip your hand in between the floor and the small of your back, with your palm touching the floor and your back touching the back of your hand. 2. Tighten the thigh muscles in your affected leg by pressing the back of your knee flat down to the floor. Hold your knee straight. 3. Keeping the thigh muscles tight and your leg straight, lift your affected leg up so that your heel is about 12 inches off the floor. Hold for about 6 seconds, then lower slowly.   4. Relax for up to make and go to all appointments, and call your doctor if you are having problems. It's also a good idea to know your test results and keep a list of the medicines you take. Where can you learn more? Go to https://reshma.[employer]. org and sign in to your [employer] account. Enter C159 in the [employer] box to learn more about \"Knee Arthritis: Exercises. \"     If you do not have an account, please click on the \"Sign Up Now\" link. Current as of: March 21, 2017  Content Version: 11.4  © 6513-0385 [employer]. Care instructions adapted under license by [facility] ([facility]). If you have questions about a medical condition or this instruction, always ask your healthcare professional. [address] any warranty or liability for your use of this information. Patient Education        Back Stretches: Exercises  Your Care Instructions  Here are some examples of exercises for stretching your back. Start each exercise slowly. Ease off the exercise if you start to have pain. Your doctor or physical therapist will tell you when you can start these exercises and which ones will work best for you. How to do the exercises  Overhead stretch    6. Stand comfortably with your feet shoulder-width apart. 7. Looking straight ahead, raise both arms over your head and reach toward the ceiling. Do not allow your head to tilt back. 8. Hold for 15 to 30 seconds, then lower your arms to your sides. 9. Repeat 2 to 4 times. Side stretch    6. Stand comfortably with your feet shoulder-width apart. 7. Raise one arm over your head, and then lean to the other side. 8. Slide your hand down your leg as you let the weight of your arm gently stretch your side muscles. Hold for 15 to 30 seconds. 9. Repeat 2 to 4 times on each side. Press-up    7. Lie on your stomach, supporting your body with your forearms. 8. Press your elbows down into the floor to raise your upper back.  As you do this, relax your stomach muscles and allow your back to arch without using your back muscles. As your press up, do not let your hips or pelvis come off the floor. 9. Hold for 15 to 30 seconds, then relax. 10. Repeat 2 to 4 times. Relax and rest    6. Lie on your back with a rolled towel under your neck and a pillow under your knees. Extend your arms comfortably to your sides. 7. Relax and breathe normally. 8. Remain in this position for about 10 minutes. 9. If you can, do this 2 or 3 times each day. Follow-up care is a key part of your treatment and safety. Be sure to make and go to all appointments, and call your doctor if you are having problems. It's also a good idea to know your test results and keep a list of the medicines you take. Where can you learn more? Go to https://[email or web address].[email or web address] org and sign in to your [place] account. Enter F819 in the [employer] box to learn more about \"Back Stretches: Exercises. \"     If you do not have an account, please click on the \"Sign Up Now\" link. Current as of: March 21, 2017  Content Version: 11.4  © 4577-0781 [employer]. Care instructions adapted under license by [facility] ([facility]). If you have questions about a medical condition or this instruction, always ask your healthcare professional. [place] [address] any warranty or liability for your use of this information.

## 2018-01-03 ENCOUNTER — OFFICE VISIT (OUTPATIENT)
Dept: INFECTIOUS DISEASES | Age: 73
End: 2018-01-03

## 2018-01-03 VITALS
HEART RATE: 63 BPM | DIASTOLIC BLOOD PRESSURE: 60 MMHG | OXYGEN SATURATION: 98 % | TEMPERATURE: 98.1 F | SYSTOLIC BLOOD PRESSURE: 116 MMHG | HEIGHT: 63 IN

## 2018-01-03 DIAGNOSIS — T84.59XD INFECTION OF PROSTHETIC KNEE JOINT, SUBSEQUENT ENCOUNTER: Primary | ICD-10-CM

## 2018-01-03 DIAGNOSIS — Z96.659 INFECTION OF PROSTHETIC KNEE JOINT, SUBSEQUENT ENCOUNTER: Primary | ICD-10-CM

## 2018-01-03 PROCEDURE — 99213 OFFICE O/P EST LOW 20 MIN: CPT | Performed by: INTERNAL MEDICINE

## 2018-01-03 RX ORDER — DOXYCYCLINE HYCLATE 100 MG
100 TABLET ORAL 2 TIMES DAILY
Qty: 180 TABLET | Refills: 4 | Status: SHIPPED | OUTPATIENT
Start: 2018-01-03 | End: 2018-03-08 | Stop reason: SDUPTHER

## 2018-01-12 NOTE — PROGRESS NOTES
Infectious Diseases Outpatient Follow-up Note      Primary Care Physician:  Jean Ortiz MD       History Obtained From:   Patient, EPIC    CHIEF COMPLAINT / ID problem:    Chief Complaint   Patient presents with    Follow-up     Infected prosthetic knee joint, subsequent encounter       HISTORY OF PRESENT ILLNESS / Interval history:    Follow up on the prosthetic joint infection Rt knee on oral Doxycycline suppression and she has ESRD on HD and uses scooter for ambulation. She now has on going bi lateral shoulder pain and wants to get operated but we discussed that she remains high risk for infection given ESRD and h/o previous infections with orthopedic procedures. She has no fever no chills leg edema is controlled with dialysis.         Past Medical History:    Past Medical History:   Diagnosis Date    Acid reflux 11/18/2016    CAFL (chronic airflow limitation) ([address] 11/18/2016    Depression     ESRD on [address] ([facility] [address] 09/08/2011    Femoral artery stenosis, right ([address] 01/2016    severe, per angio (>90%)    Hyperlipidemia     Hypertension     Infected prosthetic knee joint ([facility]     Right    Morbid obesity ([address] 8/29/2008    MRSA nasal colonization 1/12/16    Right knee DJD 9/8/2011    S/P TKR (total knee replacement) 01/2016    bilateral       Past Surgical History:    Past Surgical History:   Procedure Laterality Date    CARDIAC CATHETERIZATION  04/28/2015    Dr. Bonilla Abt Bilateral     DIALYSIS FISTULA CREATION Left     HYSTERECTOMY      KNEE ARTHROPLASTY Right 01/20/2016    Dr. Grant Robles - resection arthroplasty and placement of antibiotic spacer     LYMPH NODE BIOPSY      OTHER SURGICAL HISTORY Right 01/12/2016    Dr. Pema Mansfield - RLE angiogram prior to revision knee arthroplasty    REVISION TOTAL KNEE ARTHROPLASTY Right 06/20/2016    Dr. Grant Robles - I&D w/repeat Prostalac spacer implant     REVISION TOTAL KNEE ARTHROPLASTY Right 11/15/2016    Dr. Grant Robles - [atorvastatin]    Social History:   Social History     Social History    Marital status: Single     Spouse name: N/A    Number of children: N/A    Years of education: N/A     Social History Main Topics    Smoking status: Current Every Day Smoker     Packs/day: 0.25     Years: 54.00     Types: Cigarettes     Start date: 1/1/1962    Smokeless tobacco: Never Used      Comment: Smoking about 1 pack every 2 weeks    Alcohol use No      Comment: rare    Drug use: No    Sexual activity: Not Currently     Other Topics Concern    None     Social History Narrative    None     Family History   Problem Relation Age of Onset    Stroke Mother     Hypertension Mother     Diabetes Father     Diabetes Sister     Osteoarthritis Maternal Grandmother          REVIEW OF SYSTEMS:    No fever / chills / sweats. No weight loss. No visual change, eye pain, eye discharge. No oral lesion, sore throat, dysphagia. Denies cough / sputum. Denies chest pain, palpitations. Denies n / v / abd pain. No diarrhea. Denies dysuria or change in urinary function. Denies joint swelling or pain. No myalgia, arthralgia. Denies focal weakness, sensory change or other neurologic symptom  No lymph node swelling or tenderness. No symptoms endocrinopathy. No symptoms hematologic disease.   Shoulder pain, Rt knee pain    PHYSICAL EXAM:    Vitals:    /60 (Site: Right Arm, Position: Sitting, Cuff Size: Large Adult)   Pulse 63   Temp 98.1 °F (36.7 °C) (Oral)   Ht 5' 3\" (1.6 m)   LMP 01/21/1964   SpO2 98%   General Appearance: alert,  in no acute distress, +  pallor, no icterus skin changes from ESRD+   Skin: warm and dry, no rash or erythema  Head: normocephalic and atraumatic  Eyes: pupils equal, round, and reactive to light, conjunctivae normal  ENT: tympanic membrane, external ear and ear canal normal bilaterally, nose without deformity, nasal mucosa and turbinates normal without polyps  Neck: supple and non-tender without with any questions or concerns.     Braden Chavez MD  Infectious Disease  [facility] ([facility]) Physician  Phone: [phone]   Fax : [phone]

## 2018-01-16 DIAGNOSIS — K59.00 CONSTIPATION, UNSPECIFIED CONSTIPATION TYPE: ICD-10-CM

## 2018-01-16 DIAGNOSIS — G89.4 CHRONIC PAIN SYNDROME: Primary | ICD-10-CM

## 2018-01-16 RX ORDER — POLYETHYLENE GLYCOL 3350 17 G/17G
POWDER, FOR SOLUTION ORAL
Qty: 527 G | Refills: 0 | Status: SHIPPED | OUTPATIENT
Start: 2018-01-16 | End: 2018-04-20

## 2018-01-23 ENCOUNTER — OFFICE VISIT (OUTPATIENT)
Dept: ORTHOPEDIC SURGERY | Age: 73
End: 2018-01-23

## 2018-01-23 VITALS
SYSTOLIC BLOOD PRESSURE: 135 MMHG | BODY MASS INDEX: 33.03 KG/M2 | DIASTOLIC BLOOD PRESSURE: 68 MMHG | WEIGHT: 223 LBS | HEIGHT: 69 IN | TEMPERATURE: 97.8 F | HEART RATE: 81 BPM

## 2018-01-23 DIAGNOSIS — M75.82 ROTATOR CUFF TENDONITIS, LEFT: Primary | ICD-10-CM

## 2018-01-23 DIAGNOSIS — M51.37 DDD (DEGENERATIVE DISC DISEASE), LUMBOSACRAL: ICD-10-CM

## 2018-01-23 PROCEDURE — 99212 OFFICE O/P EST SF 10 MIN: CPT | Performed by: PHYSICIAN ASSISTANT

## 2018-01-23 PROCEDURE — 20610 DRAIN/INJ JOINT/BURSA W/O US: CPT | Performed by: PHYSICIAN ASSISTANT

## 2018-01-23 NOTE — PROGRESS NOTES
Subjective:      Patient ID: Martinez Coburn is a 74 y.o.  female. Chief Complaint   Patient presents with    Results     MRI lumbar    Shoulder Pain     left shoulder pain        HPI: She is here for follow-up treatment. She is here today to review her MRI results on her lumbar spine as well as discuss her left shoulder pain. Left shoulder pain has returned over the last several months. No new injury. Prior injection 5/24/2017 provided mild relief. Having difficulty sleeping on her left side. She's having difficulty raising her left arm. She denies any significant numbness or tingling in the upper extremity. Review of Systems:   Negative for fever or chills. Negative for numbness or tingling in the upper extremity.     Past Medical History:   Diagnosis Date    Acid reflux 11/18/2016    CAFL (chronic airflow limitation) ([facility] 11/18/2016    Depression     ESRD on dialysis ([facility] [address] 09/08/2011    Femoral artery stenosis, right ([address] 01/2016    severe, per angio (>90%)    Hyperlipidemia     Hypertension     Infected prosthetic knee joint ([facility] [address]     Right    Morbid obesity ([address] 8/29/2008    MRSA nasal colonization 1/12/16    Peripheral vascular disease ([facility] [address] [facility]     Right knee DJD 9/8/2011    S/P TKR (total knee replacement) 01/2016    bilateral       Family History   Problem Relation Age of Onset    Stroke Mother     Hypertension Mother     Diabetes Father     Diabetes Sister     Osteoarthritis Maternal Grandmother        Past Surgical History:   Procedure Laterality Date    CARDIAC CATHETERIZATION  04/28/2015    Dr. Mary Aranda Bilateral     DIALYSIS FISTULA CREATION Left     HYSTERECTOMY      KNEE ARTHROPLASTY Right 01/20/2016    Dr. Leandro Mane - resection arthroplasty and placement of antibiotic spacer     LYMPH NODE BIOPSY      OTHER SURGICAL HISTORY Right 01/12/2016    Dr. Vasyl Singh - RLE angiogram prior to revision knee arthroplasty    REVISION TOTAL KNEE ARTHROPLASTY Right 06/20/2016    Dr. Mick Webber - I&D w/repeat Prostalac spacer implant     REVISION TOTAL KNEE ARTHROPLASTY Right 11/15/2016    Dr. Mick Webber - staged revision due to infection    TOTAL KNEE ARTHROPLASTY Right 2007    Dr. Joanie Potter Left 2008    Dr. Michelle De Paz Not on file. Social History Main Topics    Smoking status: Current Every Day Smoker     Packs/day: 0.25     Years: 54.00     Types: Cigarettes     Start date: 1/1/1962    Smokeless tobacco: Never Used      Comment: Smoking about 1 pack every 2 weeks    Alcohol use No      Comment: rare    Drug use: No    Sexual activity: Not Currently       Current Outpatient Prescriptions   Medication Sig Dispense Refill    polyethylene glycol (GLYCOLAX) powder TAKE 17 GRAMS DAILY. 527 g 0    doxycycline hyclate (VIBRA-TABS) 100 MG tablet Take 1 tablet by mouth 2 times daily 180 tablet 4    DULoxetine (CYMBALTA) 30 MG extended release capsule Take 1 capsule by mouth daily 30 capsule 0    diclofenac sodium (VOLTAREN) 1 % GEL Apply 2-4 g topically 2 times daily 5 Tube 0    HYDROcodone-acetaminophen (NORCO) 5-325 MG per tablet Take 1 tablet by mouth 2 times daily as needed for Pain for up to 28 days.  60 tablet 0    doxycycline hyclate (VIBRA-TABS) 100 MG tablet TAKE 1 TABLET BY MOUTH TWICE DAILY 60 tablet 0    cloNIDine (CATAPRES) 0.2 MG tablet Take 0.2 mg by mouth 3 times daily      benzonatate (TESSALON PERLES) 100 MG capsule Take 1 capsule by mouth 2 times daily as needed for Cough 20 capsule 0    albuterol sulfate HFA (PROAIR HFA) 108 (90 Base) MCG/ACT inhaler Inhale 2 puffs into the lungs 4 times daily as needed for Wheezing 1 Inhaler 0    Vitamin D, Cholecalciferol, 1000 units CAPS Take 1,000 Units by mouth daily 30 capsule 3    alendronate (FOSAMAX) 70 MG tablet Take 1 tablet by mouth every 7 days 4 tablet 3    nicotine polacrilex (NICORETTE) 2 MG gum Take 1 each by mouth as needed for Smoking cessation 110 each 0    promethazine (PHENERGAN) 25 MG tablet Take 25 mg by mouth every 8 hours as needed for Nausea      calcium acetate (PHOSLO) 667 MG capsule Take by mouth 3 times daily (with meals)      minoxidil (LONITEN) 2.5 MG tablet TAKE 1 TABLET BY MOUTH TWICE DAILY 60 tablet 5    amLODIPine (NORVASC) 10 MG tablet Take 10 mg by mouth daily       No current facility-administered medications for this visit. Objective:   She is alert, oriented x 3, pleasant, well nourished, developed and in no acute distress. /68   Pulse 81   Temp 97.8 °F (36.6 °C) (Temporal)   Ht 5' 8.5\" (1.74 m)   Wt 223 lb (101.2 kg)   LMP 01/21/1964   BMI 33.41 kg/m²      SHOULDER EXAM:  Examination of the left shoulder shows: There is no deformity. There is no erythema. There is mild  soft tissue swelling. Deltoid region is  tender to palpation. AC Joint is  tender to palpation. Clavicle is not tender to palpation. Bicipital Groove is not  tender to palpation. Pectoralis  is  tender to palpation. Scapula/ trapezius is not tender to palpation. There is moderate weakness with supraspinatus testing. There is moderate pain with supraspinatus testing. Yergason Test negative. Drop Arm Test negative. Apprehension Test negative. Cross Arm Test negative. Supraspinatus Test positive. Shoulder AROM-         IR to L1 ER 30    MRI: Obt[facility] from [facility] or an outside facility.     administration of intravenous contrast.       COMPARISON:   Lumbar spine radiographs on 08/15/2017.       HISTORY:   ORDERING PHYSICIAN PROVIDED HISTORY: DDD (degenerative disc disease),   lumbosacral   TECHNOLOGIST PROVIDED HISTORY:   Technologist Provided Reason for Exam: DDD (degenerative disc disease),   lumbosacral M51.37 (ICD-10-CM), Spinal stenosis of lumbar region, unspecified   whether neurogenic claudication present M48.061 (ICD-10-CM)   Acuity: Chronic   Type of Encounter: bulging.       Mild listhesis at L4-5 and L5-S1 likely degenerative.       Mild- moderate multilevel neural foraminal narrowing.               Prior x-rays of her left shoulder were reviewed. She has significant osteoarthritis of the glenohumeral joint, a.c. joint as well as significant subacromial space narrowing consistent with chronic rotator cuff tear. X Rays: not performed in the office today:   Diagnosis:        ICD-10-CM ICD-9-CM    1. Rotator cuff tendonitis, left M75.82 726.10 MA ARTHROCENTESIS ASPIR&/INJ MAJOR JT/BURSA W/O US      MA TRIAMCINOLONE ACETONIDE INJ   2. DDD (degenerative disc disease), lumbosacral M51.37 722.52 [id number]        Assessment/ Plan:      The natural history of the patient's diagnosis as well as the treatment options were discussed in full and questions were answered. Risks and benefits of the treatment options also reviewed in detail. Discussed options for her left shoulder including repeat injection and surgery such as reverse shoulder arthroplasty. She does not feel she is medically able to undergo any further surgery. I have suggested repeat left shoulder injection. Cortisone  Injection  PROCEDURE NOTE:  Pre op Diagnosis: Shoulder pain  Post op Diagnosis: Same  With the patient's permission, her left shoulder was prepped  in standard sterile fashion with  Alcohol and 2 cc of 0.25% Marcaine and 1 cc of Kenalog 40 mg was injected into the left lateral subacromial space  without difficulty. She   tolerated this well without difficulty. A band-aid was applied. She   was advised to ice the shoulder for 15-20 minutes to relieve any injection site related pain. She has significant degenerative disc disease of lumbar spine with foraminal stenosis according to her MRI. She is not a surgical candidate for any lumbar surgery. I'm going to refer her to Dr. Adenike Royal for consideration of epidural steroid injections.       Follow Up: Detailed exam

## 2018-01-30 ENCOUNTER — OFFICE VISIT (OUTPATIENT)
Dept: PAIN MANAGEMENT | Age: 73
End: 2018-01-30

## 2018-01-30 VITALS — HEART RATE: 94 BPM | OXYGEN SATURATION: 94 % | DIASTOLIC BLOOD PRESSURE: 83 MMHG | SYSTOLIC BLOOD PRESSURE: 163 MMHG

## 2018-01-30 DIAGNOSIS — M47.817 FACET ARTHROPATHY, LUMBOSACRAL: ICD-10-CM

## 2018-01-30 DIAGNOSIS — F43.21 COMPLICATED GRIEVING: ICD-10-CM

## 2018-01-30 DIAGNOSIS — E66.01 MORBID OBESITY (HCC): ICD-10-CM

## 2018-01-30 DIAGNOSIS — R29.898 WEAKNESS OF BOTH LEGS: ICD-10-CM

## 2018-01-30 DIAGNOSIS — G89.4 CHRONIC PAIN SYNDROME: Primary | ICD-10-CM

## 2018-01-30 DIAGNOSIS — M17.11 OSTEOARTHRITIS OF RIGHT KNEE, UNSPECIFIED OSTEOARTHRITIS TYPE: ICD-10-CM

## 2018-01-30 DIAGNOSIS — M19.011 BILATERAL SHOULDER REGION ARTHRITIS: ICD-10-CM

## 2018-01-30 DIAGNOSIS — M43.16 SPONDYLOLISTHESIS AT L4-L5 LEVEL: ICD-10-CM

## 2018-01-30 DIAGNOSIS — M19.012 BILATERAL SHOULDER REGION ARTHRITIS: ICD-10-CM

## 2018-01-30 DIAGNOSIS — M48.061 SPINAL STENOSIS OF LUMBAR REGION, UNSPECIFIED WHETHER NEUROGENIC CLAUDICATION PRESENT: ICD-10-CM

## 2018-01-30 DIAGNOSIS — M51.37 DDD (DEGENERATIVE DISC DISEASE), LUMBOSACRAL: ICD-10-CM

## 2018-01-30 DIAGNOSIS — K59.03 DRUG-INDUCED CONSTIPATION: ICD-10-CM

## 2018-01-30 PROCEDURE — 99213 OFFICE O/P EST LOW 20 MIN: CPT | Performed by: NURSE PRACTITIONER

## 2018-01-30 RX ORDER — HYDROCODONE BITARTRATE AND ACETAMINOPHEN 5; 325 MG/1; MG/1
1 TABLET ORAL 2 TIMES DAILY PRN
Qty: 60 TABLET | Refills: 0 | Status: SHIPPED | OUTPATIENT
Start: 2018-01-30 | End: 2018-03-20 | Stop reason: SDUPTHER

## 2018-01-30 RX ORDER — DULOXETIN HYDROCHLORIDE 30 MG/1
30 CAPSULE, DELAYED RELEASE ORAL DAILY
Qty: 30 CAPSULE | Refills: 0 | Status: SHIPPED | OUTPATIENT
Start: 2018-01-30 | End: 2018-03-20 | Stop reason: SDUPTHER

## 2018-01-30 NOTE — PATIENT INSTRUCTIONS
Patient Education        Back Stretches: Exercises  Your Care Instructions  Here are some examples of exercises for stretching your back. Start each exercise slowly. Ease off the exercise if you start to have pain. Your doctor or physical therapist will tell you when you can start these exercises and which ones will work best for you. How to do the exercises  Overhead stretch    1. Stand comfortably with your feet shoulder-width apart. 2. Looking straight ahead, raise both arms over your head and reach toward the ceiling. Do not allow your head to tilt back. 3. Hold for 15 to 30 seconds, then lower your arms to your sides. 4. Repeat 2 to 4 times. Side stretch    1. Stand comfortably with your feet shoulder-width apart. 2. Raise one arm over your head, and then lean to the other side. 3. Slide your hand down your leg as you let the weight of your arm gently stretch your side muscles. Hold for 15 to 30 seconds. 4. Repeat 2 to 4 times on each side. Press-up    1. Lie on your stomach, supporting your body with your forearms. 2. Press your elbows down into the floor to raise your upper back. As you do this, relax your stomach muscles and allow your back to arch without using your back muscles. As your press up, do not let your hips or pelvis come off the floor. 3. Hold for 15 to 30 seconds, then relax. 4. Repeat 2 to 4 times. Relax and rest    1. Lie on your back with a rolled towel under your neck and a pillow under your knees. Extend your arms comfortably to your sides. 2. Relax and breathe normally. 3. Remain in this position for about 10 minutes. 4. If you can, do this 2 or 3 times each day. Follow-up care is a key part of your treatment and safety. Be sure to make and go to all appointments, and call your doctor if you are having problems. It's also a good idea to know your test results and keep a list of the medicines you take. Where can you learn more?   Go to [email or web address] and sign in to your [employer] account. Enter H157 in the [employer] box to learn more about \"Back Stretches: Exercises. \"     If you do not have an account, please click on the \"Sign Up Now\" link. Current as of: March 21, 2017  Content Version: 11.5  © 0396-2899 Healthwise, [employer]. Care instructions adapted under license by [facility] ([facility]). If you have questions about a medical condition or this instruction, always ask your healthcare professional. [address] any warranty or liability for your use of this information.

## 2018-01-30 NOTE — PROGRESS NOTES
small distances in her house, but mostly uses a walker outside her home. She continues to get home health assistance. ALLERGIES: Patients list of allergies were reviewed     MEDICATIONS: Ms. Alyssa Pang list of medications were reviewed. Her current medications are   Outpatient Medications Prior to Visit   Medication Sig Dispense Refill    polyethylene glycol (GLYCOLAX) powder TAKE 17 GRAMS DAILY. 527 g 0    doxycycline hyclate (VIBRA-TABS) 100 MG tablet Take 1 tablet by mouth 2 times daily 180 tablet 4    diclofenac sodium (VOLTAREN) 1 % GEL Apply 2-4 g topically 2 times daily 5 Tube 0    doxycycline hyclate (VIBRA-TABS) 100 MG tablet TAKE 1 TABLET BY MOUTH TWICE DAILY 60 tablet 0    cloNIDine (CATAPRES) 0.2 MG tablet Take 0.2 mg by mouth 3 times daily      albuterol sulfate HFA (PROAIR HFA) 108 (90 Base) MCG/ACT inhaler Inhale 2 puffs into the lungs 4 times daily as needed for Wheezing 1 Inhaler 0    Vitamin D, Cholecalciferol, 1000 units CAPS Take 1,000 Units by mouth daily 30 capsule 3    alendronate (FOSAMAX) 70 MG tablet Take 1 tablet by mouth every 7 days 4 tablet 3    nicotine polacrilex (NICORETTE) 2 MG gum Take 1 each by mouth as needed for Smoking cessation 110 each 0    promethazine (PHENERGAN) 25 MG tablet Take 25 mg by mouth every 8 hours as needed for Nausea      calcium acetate (PHOSLO) 667 MG capsule Take by mouth 3 times daily (with meals)      minoxidil (LONITEN) 2.5 MG tablet TAKE 1 TABLET BY MOUTH TWICE DAILY 60 tablet 5    amLODIPine (NORVASC) 10 MG tablet Take 10 mg by mouth daily      DULoxetine (CYMBALTA) 30 MG extended release capsule Take 1 capsule by mouth daily 30 capsule 0    HYDROcodone-acetaminophen (NORCO) 5-325 MG per tablet Take 1 tablet by mouth 2 times daily as needed for Pain for up to 28 days.  60 tablet 0    benzonatate (TESSALON PERLES) 100 MG capsule Take 1 capsule by mouth 2 times daily as needed for Cough 20 capsule 0     No facility-administered medications

## 2018-02-02 ENCOUNTER — TELEPHONE (OUTPATIENT)
Dept: ORTHOPEDIC SURGERY | Age: 73
End: 2018-02-02

## 2018-02-02 NOTE — TELEPHONE ENCOUNTER
Aleksey Moura from [facility] calling with an FYI:    Patient start of care for Pt is going to be delayed until Tuesday 2/6/18  Aleksey Moura states this is per patients request

## 2018-02-08 ENCOUNTER — TELEPHONE (OUTPATIENT)
Dept: INTERNAL MEDICINE CLINIC | Age: 73
End: 2018-02-08

## 2018-02-20 ENCOUNTER — HOSPITAL ENCOUNTER (OUTPATIENT)
Dept: VASCULAR LAB | Age: 73
Discharge: HOME | End: 2018-02-20
Attending: INTERNAL MEDICINE | Admitting: INTERNAL MEDICINE

## 2018-02-20 DIAGNOSIS — G45.3 AMAUROSIS FUGAX: ICD-10-CM

## 2018-02-20 LAB
LV EF: 70 %
LVEF MODALITY: NORMAL

## 2018-02-22 ENCOUNTER — TELEPHONE (OUTPATIENT)
Dept: INTERNAL MEDICINE CLINIC | Age: 73
End: 2018-02-22

## 2018-02-22 NOTE — TELEPHONE ENCOUNTER
Chery calling from Dr. Salamanca Me office requesting the report from     [facility] completed on 2/20/18 faxed to [phone]    Call back [phone]

## 2018-03-08 ENCOUNTER — OFFICE VISIT (OUTPATIENT)
Dept: CARDIOLOGY CLINIC | Age: 73
End: 2018-03-08

## 2018-03-08 VITALS
SYSTOLIC BLOOD PRESSURE: 94 MMHG | HEART RATE: 68 BPM | BODY MASS INDEX: 35.06 KG/M2 | WEIGHT: 234 LBS | DIASTOLIC BLOOD PRESSURE: 54 MMHG

## 2018-03-08 DIAGNOSIS — N18.6 ESRD (END STAGE RENAL DISEASE) (HCC): ICD-10-CM

## 2018-03-08 DIAGNOSIS — I73.9 PERIPHERAL VASCULAR DISEASE (HCC): ICD-10-CM

## 2018-03-08 DIAGNOSIS — I70.201 FEMORAL ARTERY STENOSIS, RIGHT (HCC): ICD-10-CM

## 2018-03-08 DIAGNOSIS — I10 ESSENTIAL HYPERTENSION: Primary | ICD-10-CM

## 2018-03-08 DIAGNOSIS — I65.23 BILATERAL CAROTID ARTERY STENOSIS: Chronic | ICD-10-CM

## 2018-03-08 DIAGNOSIS — E78.00 HYPERCHOLESTEROLEMIA: ICD-10-CM

## 2018-03-08 PROCEDURE — 99214 OFFICE O/P EST MOD 30 MIN: CPT | Performed by: NURSE PRACTITIONER

## 2018-03-08 RX ORDER — ASPIRIN 81 MG/1
81 TABLET ORAL DAILY
Qty: 30 TABLET | Refills: 3 | Status: SHIPPED | OUTPATIENT
Start: 2018-03-08 | End: 2018-12-15 | Stop reason: SDUPTHER

## 2018-03-08 RX ORDER — ROSUVASTATIN CALCIUM 10 MG/1
10 TABLET, COATED ORAL DAILY
Qty: 30 TABLET | Refills: 3 | Status: SHIPPED | OUTPATIENT
Start: 2018-03-08 | End: 2018-03-12 | Stop reason: CLARIF

## 2018-03-08 NOTE — PATIENT INSTRUCTIONS
Start asa and crestor po / fu with vasc (she has appt with vasc at [facility])   Fu in 3 months

## 2018-03-08 NOTE — PROGRESS NOTES
internal carotid artery. 2.  Calcified plaques within bilateral vertebral arteries without flow-limiting stenosis or    occlusion. ESRD  HD for 10 years     HFpEF / appears compensated   Echo 2/18  Left ventricle size is normal.   moderate left ventricular hypertrophy is present.   Global left ventricular function is increased (hyperdynamic) with ejection   fraction estimated 70 %. Diastolic dysfunction.   The aortic valve is structurally normal.   Accelerated flow across the aortic valve and LVOT.   No significant hemodynamic stenosis.   The right ventricle is not well visualized.   Normal RVEF   mitral annular calcification    Neg stress test 9/19/17     HTN   On Norvasc catapres   b/p low today asymptomatic     ZULEMA? 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  with hx hypertension urgency / us with 50-79% stenosis hilario   Start asa and crestor po / fu with vasc (she has appt with vasc at [facility])   Call if myalgias with crestor   Fu in 3 months with lipid hep panel   Sleep clinic referral      [address]

## 2018-03-09 RX ORDER — PRAVASTATIN SODIUM 40 MG
40 TABLET ORAL DAILY
Qty: 30 TABLET | Refills: 3 | Status: SHIPPED | OUTPATIENT
Start: 2018-03-09 | End: 2018-07-02 | Stop reason: SDUPTHER

## 2018-03-12 ENCOUNTER — OFFICE VISIT (OUTPATIENT)
Dept: INTERNAL MEDICINE CLINIC | Age: 73
End: 2018-03-12

## 2018-03-12 VITALS
BODY MASS INDEX: 45.08 KG/M2 | HEART RATE: 68 BPM | TEMPERATURE: 98.7 F | DIASTOLIC BLOOD PRESSURE: 80 MMHG | HEIGHT: 62 IN | SYSTOLIC BLOOD PRESSURE: 120 MMHG | WEIGHT: 245 LBS

## 2018-03-12 DIAGNOSIS — M81.8 OTHER OSTEOPOROSIS, UNSPECIFIED PATHOLOGICAL FRACTURE PRESENCE: ICD-10-CM

## 2018-03-12 DIAGNOSIS — N18.6 ESRD (END STAGE RENAL DISEASE) (HCC): ICD-10-CM

## 2018-03-12 DIAGNOSIS — R91.1 PULMONARY NODULE: ICD-10-CM

## 2018-03-12 DIAGNOSIS — R63.5 WEIGHT GAIN: ICD-10-CM

## 2018-03-12 DIAGNOSIS — N89.8 VAGINA ITCHING: ICD-10-CM

## 2018-03-12 DIAGNOSIS — E55.9 VITAMIN D DEFICIENCY: ICD-10-CM

## 2018-03-12 DIAGNOSIS — J30.89 OTHER ALLERGIC RHINITIS: ICD-10-CM

## 2018-03-12 DIAGNOSIS — I10 ESSENTIAL HYPERTENSION: Primary | ICD-10-CM

## 2018-03-12 DIAGNOSIS — F17.200 TOBACCO DEPENDENCE: ICD-10-CM

## 2018-03-12 DIAGNOSIS — E78.5 HYPERLIPIDEMIA LDL GOAL <100: ICD-10-CM

## 2018-03-12 PROCEDURE — 99214 OFFICE O/P EST MOD 30 MIN: CPT | Performed by: INTERNAL MEDICINE

## 2018-03-12 PROCEDURE — 99406 BEHAV CHNG SMOKING 3-10 MIN: CPT | Performed by: INTERNAL MEDICINE

## 2018-03-12 RX ORDER — ALENDRONATE SODIUM 70 MG/1
70 TABLET ORAL
Qty: 4 TABLET | Refills: 3 | Status: SHIPPED | OUTPATIENT
Start: 2018-03-12 | End: 2018-07-02 | Stop reason: SDUPTHER

## 2018-03-12 RX ORDER — LORATADINE 10 MG/1
10 TABLET ORAL DAILY PRN
Qty: 30 TABLET | Refills: 3 | Status: SHIPPED | OUTPATIENT
Start: 2018-03-12 | End: 2018-04-11

## 2018-03-12 RX ORDER — BLOOD PRESSURE TEST KIT-LARGE
1 KIT MISCELLANEOUS PRN
Qty: 1 DEVICE | Refills: 0 | Status: SHIPPED | OUTPATIENT
Start: 2018-03-12

## 2018-03-12 RX ORDER — FLUCONAZOLE 150 MG/1
150 TABLET ORAL ONCE
Qty: 1 TABLET | Refills: 0 | Status: SHIPPED | OUTPATIENT
Start: 2018-03-12 | End: 2018-03-12

## 2018-03-12 NOTE — PROGRESS NOTES
SUBJECTIVE:  Luis Miguel Feldman is a 74 y.o. female [address] Complaint   Patient presents with    Follow-up     patient here for followup, med refill    Medication Refill    Hypertension    Other        PT HERE FOR EVAL    HTN -  ON MEDS . NO HEADACHE , DIZZINESS No. P REQUESTING BP MONITOR  HLP -  TAKING PRAVACHOL. EXERCISE / DIET COMPLIANCE . LABS D/W PT  VIT D DEF - STATES MED D/CAMILO PER RENAL. LABS D/W PT  ESRD - ON DIALYSIS- M-W-FR. TOLERATING. FG WITH RENAL  PULM. NODULE - NOTED ON CT D/W PT  STILL SMOKING - CESSATION READDRESSED. STATES USING NICOTINE GUM  OSTEOPOROSIS - ? MED COMPLIANCE. PREVIOUS DEXA SCAN D/W PT  ALLERGIC RHINITIS - OCC  NASAL CONGESTION, + POSTNASAL DRAINAGE , NO SINUS PRESSURE, NO HA, + SNEEZING, + OCC WATERY ITCHY EYES.  WT GAIN - DIET / EXERCISE REVIEWED. WT NOTED  C/O VAGINAL ITCHING - REQUESTING MED FOR YEAST INFECTION      DENIES CP, No SOB, No PALPITATIONS, OCC COUGH - NON PRODUCTIVE  No ABD PAIN, No N/V, No DIARRHEA, No CONSTIPATION, No MELENA, No HEMATOCHEZIA. No DYSURIA, No  FREQ, No URGENCY, No HEMATURIA    PMH: REVIEWED    PSH: REVIEWED:    ALLERGY:  Iv dye [iodides]; Lisinopril; Peanut oil; Hydralazine; and Lipitor [atorvastatin]    MEDS: REVIEWED  Prior to Visit Medications    Medication Sig Taking?  Authorizing Provider   pravastatin (PRAVACHOL) 40 MG tablet Take 1 tablet by mouth daily Yes Jackie Gerber NP   aspirin EC 81 MG EC tablet Take 1 tablet by mouth daily Yes Jackie Gerber NP   cloNIDine (CATAPRES) 0.2 MG tablet Take 0.2 mg by mouth 3 times daily Yes Historical Provider, MD   minoxidil (LONITEN) 2.5 MG tablet TAKE 1 TABLET BY MOUTH TWICE DAILY Yes Julianna Vera MD   amLODIPine (NORVASC) 10 MG tablet Take 10 mg by mouth daily Yes Historical Provider, MD COATES NICOTINE 10 MG/16 HR PATCH AND REMOVAL, INPATIENT,   Historical Provider, MD   DULoxetine (CYMBALTA) 30 MG extended release capsule Take 1 capsule by mouth daily  Denver Morocho, NP TENDERNESS  PELVIC: DEFERRED      PREVIOUS LABS  REVIEWED AND D/W PT       ASSESSMENT / PLAN:      1. Essential hypertension  COUNSELLED. STABLE. CONTINUE MEDS. LOW NA+ / DASH DIET/ EXERCISE. MONITOR. GOAL </= 120/80  BP MONITOR ORDERED AS REQUESTED  F/U LABS  MAKE CHANGES AS NEEDED. 2. Hyperlipidemia LDL goal <100  COUNSELLED. ADVISED LOW FAT / CHOL DIET/ EXERCISE.  MONITOR ON PRAVACHOL. F/U LABS  GOALS D/W PT.  MAKE CHANGES AS NEEDED. 3. Vitamin D deficiency  COUNSELLED. OFF MED. MONITOR  MAKE CHANGES AS NEEDED. 4. ESRD (end stage renal disease) ([facility] [address]  COUNSELLED. ON HD. F/U RENAL  MAKE CHANGES AS NEEDED. 5. Pulmonary nodule  COUNSELLED. READDRESS FOLLOW UP STUDY  ADVISED SMOKING CESSATION  MAKE CHANGES AS NEEDED. 6. Tobacco dependence  Smoking cessation was encouraged. Cessation techniques reviewed today. COUNSELLED. CESSATION ADVISED   ME TOBACCO USE CESSATION INTERMEDIATE 4-24 MINUTES  COMPLICATIONS OF TOBACCO USE INCLUDING COPD, CANCER, CAD D/W PT  PT VERBALIZED UNDERSTANDING  MAKE CHANGES AS NEEDED. 7. Other osteoporosis, unspecified pathological fracture presence  COUNSELLED. ADVISED MED COMPLIANCE - FOSAMAX 70 MG WEEKLY  ADVISED MOE/ VIT D. EXERCISES. MONITOR. MAKE CHANGES AS NEEDED. 8. Other allergic rhinitis  COUNSELLED. ADVISED CLARITIN 10 MG DAILY / PRN. MONITOR. MAKE CHANGES AS NEEDED. 9. Weight gain  COUNSELLED. DIET/ EXERCISE ADVISED. LIFESTYLE MODIFICATION. WT LOSS ADVISED. MONITOR AND MAKE CHANGES AS NEEDED. 10. Vagina itching  COUNSELLED. RX FOR CANDIDIASIS - DIFLUCAN 150 MG ONCE  MAKE CHANGES AS NEEDED.               Maegan received counseling on the following healthy behaviors: nutrition, exercise, medication adherence and tobacco cessation    Patient given educational materials on Hyperlipidemia, Smoking Cessation, Nutrition, Exercise and Hypertension    I have instructed Maegan to complete a self tracking handout on Blood Pressures , Weights and Smoking and instructed them to bring it with them to her next appointment. Discussed use, benefit, and side effects of prescribed medications. Barriers to medication compliance addressed. All patient questions answered. Pt voiced understanding.                MEDICATION SIDE EFFECTS D/W PATIENT    PT STABLE AT TIME OF D/C.    RETURN TO CLINIC WITHIN 3 MONTHS / PRN    FOLLOW UP FOR FASTING LABS - PREVIOUSLY ORDERED

## 2018-03-20 ENCOUNTER — OFFICE VISIT (OUTPATIENT)
Dept: PAIN MANAGEMENT | Age: 73
End: 2018-03-20

## 2018-03-20 VITALS — HEART RATE: 78 BPM | SYSTOLIC BLOOD PRESSURE: 110 MMHG | OXYGEN SATURATION: 97 % | DIASTOLIC BLOOD PRESSURE: 60 MMHG

## 2018-03-20 DIAGNOSIS — M17.11 OSTEOARTHRITIS OF RIGHT KNEE, UNSPECIFIED OSTEOARTHRITIS TYPE: ICD-10-CM

## 2018-03-20 DIAGNOSIS — I73.9 PERIPHERAL VASCULAR DISEASE (HCC): ICD-10-CM

## 2018-03-20 DIAGNOSIS — M47.817 FACET ARTHROPATHY, LUMBOSACRAL: ICD-10-CM

## 2018-03-20 DIAGNOSIS — M51.37 DDD (DEGENERATIVE DISC DISEASE), LUMBOSACRAL: ICD-10-CM

## 2018-03-20 DIAGNOSIS — M19.012 BILATERAL SHOULDER REGION ARTHRITIS: ICD-10-CM

## 2018-03-20 DIAGNOSIS — M48.061 SPINAL STENOSIS OF LUMBAR REGION, UNSPECIFIED WHETHER NEUROGENIC CLAUDICATION PRESENT: ICD-10-CM

## 2018-03-20 DIAGNOSIS — M43.16 SPONDYLOLISTHESIS AT L4-L5 LEVEL: ICD-10-CM

## 2018-03-20 DIAGNOSIS — M19.011 BILATERAL SHOULDER REGION ARTHRITIS: ICD-10-CM

## 2018-03-20 DIAGNOSIS — G89.4 CHRONIC PAIN SYNDROME: Primary | ICD-10-CM

## 2018-03-20 DIAGNOSIS — E66.01 MORBID OBESITY (HCC): ICD-10-CM

## 2018-03-20 PROCEDURE — 99213 OFFICE O/P EST LOW 20 MIN: CPT | Performed by: NURSE PRACTITIONER

## 2018-03-20 RX ORDER — HYDROCODONE BITARTRATE AND ACETAMINOPHEN 5; 325 MG/1; MG/1
1 TABLET ORAL 2 TIMES DAILY PRN
Qty: 60 TABLET | Refills: 0 | Status: SHIPPED | OUTPATIENT
Start: 2018-03-20 | End: 2018-04-17

## 2018-03-20 RX ORDER — DULOXETIN HYDROCHLORIDE 30 MG/1
30 CAPSULE, DELAYED RELEASE ORAL DAILY
Qty: 30 CAPSULE | Refills: 0 | Status: SHIPPED | OUTPATIENT
Start: 2018-03-20 | End: 2018-04-17 | Stop reason: SDUPTHER

## 2018-03-20 NOTE — PROGRESS NOTES
allergies were reviewed     MEDICATIONS: Ms. Maria Fernanda Young list of medications were reviewed. Her current medications are   Outpatient Medications Prior to Visit   Medication Sig Dispense Refill    alendronate (FOSAMAX) 70 MG tablet Take 1 tablet by mouth every 7 days 4 tablet 3    Blood Pressure Monitoring (5 SERIES BP MONITOR) RACHELL 1 Device by Does not apply route as needed (PRN) 1 Device 0    loratadine (CLARITIN) 10 MG tablet Take 1 tablet by mouth daily as needed (PRN) 30 tablet 3    pravastatin (PRAVACHOL) 40 MG tablet Take 1 tablet by mouth daily 30 tablet 3    MLO NICOTINE 10 MG/16 HR PATCH AND REMOVAL, INPATIENT,       aspirin EC 81 MG EC tablet Take 1 tablet by mouth daily 30 tablet 3    DULoxetine (CYMBALTA) 30 MG extended release capsule Take 1 capsule by mouth daily 30 capsule 0    polyethylene glycol (GLYCOLAX) powder TAKE 17 GRAMS DAILY. 527 g 0    diclofenac sodium (VOLTAREN) 1 % GEL Apply 2-4 g topically 2 times daily 5 Tube 0    doxycycline hyclate (VIBRA-TABS) 100 MG tablet TAKE 1 TABLET BY MOUTH TWICE DAILY 60 tablet 0    cloNIDine (CATAPRES) 0.2 MG tablet Take 0.2 mg by mouth 3 times daily      albuterol sulfate HFA (PROAIR HFA) 108 (90 Base) MCG/ACT inhaler Inhale 2 puffs into the lungs 4 times daily as needed for Wheezing 1 Inhaler 0    nicotine polacrilex (NICORETTE) 2 MG gum Take 1 each by mouth as needed for Smoking cessation 110 each 0    promethazine (PHENERGAN) 25 MG tablet Take 25 mg by mouth every 8 hours as needed for Nausea      calcium acetate (PHOSLO) 667 MG capsule Take by mouth 3 times daily (with meals)      minoxidil (LONITEN) 2.5 MG tablet TAKE 1 TABLET BY MOUTH TWICE DAILY 60 tablet 5    amLODIPine (NORVASC) 10 MG tablet Take 10 mg by mouth daily       No facility-administered medications prior to visit. SOCIAL/FAMILY/PAST MEDICAL HISTORY: Ms. Jae Lewis, family and past medical history was reviewed.      REVIEW OF SYSTEMS:    Respiratory: Negative for reduction, diet changes- 800-1200 sierra diet, diet diary, exercising, nutritional  consult increased physical activity as tolerated  -CBT techniques- relaxation therapies such as biofeedback, mindfulness based stress reduction, imagery, cognitive restructuring, problem solving discussed with patient  -Last UDS consistent  -Return in about 4 weeks (around 4/17/2018). -[place] record was obtained and reviewed  for the last one year and no indicators of drug misuse  were found. Any other controlled substance prescriptions  seen on the record have been accounted for, I am aware of the patient receiving these medications. Rigoberto Leal OAR[place] record will be rechecked as part of office protocol. Current Outpatient Prescriptions   Medication Sig Dispense Refill    HYDROcodone-acetaminophen (NORCO) 5-325 MG per tablet Take 1 tablet by mouth 2 times daily as needed for Pain for up to 28 days. 60 tablet 0    DULoxetine (CYMBALTA) 30 MG extended release capsule Take 1 capsule by mouth daily 30 capsule 0    alendronate (FOSAMAX) 70 MG tablet Take 1 tablet by mouth every 7 days 4 tablet 3    Blood Pressure Monitoring (5 SERIES BP MONITOR) RACHELL 1 Device by Does not apply route as needed (PRN) 1 Device 0    loratadine (CLARITIN) 10 MG tablet Take 1 tablet by mouth daily as needed (PRN) 30 tablet 3    pravastatin (PRAVACHOL) 40 MG tablet Take 1 tablet by mouth daily 30 tablet 3    MLO NICOTINE 10 MG/16 HR PATCH AND REMOVAL, INPATIENT,       aspirin EC 81 MG EC tablet Take 1 tablet by mouth daily 30 tablet 3    polyethylene glycol (GLYCOLAX) powder TAKE 17 GRAMS DAILY.  527 g 0    diclofenac sodium (VOLTAREN) 1 % GEL Apply 2-4 g topically 2 times daily 5 Tube 0    doxycycline hyclate (VIBRA-TABS) 100 MG tablet TAKE 1 TABLET BY MOUTH TWICE DAILY 60 tablet 0    cloNIDine (CATAPRES) 0.2 MG tablet Take 0.2 mg by mouth 3 times daily      albuterol sulfate HFA (PROAIR HFA) 108 (90 Base) MCG/ACT inhaler Inhale 2 puffs into the lungs 4 times daily as needed for Wheezing 1 Inhaler 0    nicotine polacrilex (NICORETTE) 2 MG gum Take 1 each by mouth as needed for Smoking cessation 110 each 0    promethazine (PHENERGAN) 25 MG tablet Take 25 mg by mouth every 8 hours as needed for Nausea      calcium acetate (PHOSLO) 667 MG capsule Take by mouth 3 times daily (with meals)      minoxidil (LONITEN) 2.5 MG tablet TAKE 1 TABLET BY MOUTH TWICE DAILY 60 tablet 5    amLODIPine (NORVASC) 10 MG tablet Take 10 mg by mouth daily       No current facility-administered medications for this visit. I will continue her current medication regimen  which is part of the above treatment schedule. It has been helping with Ms. Monreal's chronic  medical problems which for this visit include: The primary encounter diagnosis was Chronic pain syndrome. Diagnoses of DDD (degenerative disc disease), lumbosacral, Facet arthropathy, lumbosacral, Spondylolisthesis at L4-L5 level, Spinal stenosis of lumbar region, unspecified whether neurogenic claudication present, Bilateral shoulder region arthritis, Osteoarthritis of right knee, unspecified osteoarthritis type, Peripheral vascular disease ([address] and Morbid obesity ([address] were also pertinent to this visit. Risks and benefits of the medications and other alternative treatments  including no treatment were discussed with the patient. The common side effects of these medications were also explained to the patient. Informed verbal consent was obtained. Goals of current treatment regimen include improvement in pain, restoration of functioning- with focus on improvement in physical performance, general activity, work or disability,emotional distress, health care utilization and  decreased medication consumption. Will continue to monitor progress towards achieving/maintaining therapeutic goals with special emphasis on  1. Improvement in perceived interfernce  of pain with ADL's. Ability to do home exercises independently.

## 2018-03-20 NOTE — PATIENT INSTRUCTIONS
this, relax your stomach muscles and allow your back to arch without using your back muscles. As your press up, do not let your hips or pelvis come off the floor. 9. Hold for 15 to 30 seconds, then relax. 10. Repeat 2 to 4 times. Relax and rest    6. Lie on your back with a rolled towel under your neck and a pillow under your knees. Extend your arms comfortably to your sides. 7. Relax and breathe normally. 8. Remain in this position for about 10 minutes. 9. If you can, do this 2 or 3 times each day. Follow-up care is a key part of your treatment and safety. Be sure to make and go to all appointments, and call your doctor if you are having problems. It's also a good idea to know your test results and keep a list of the medicines you take. Where can you learn more? Go to [email or web address] and sign in to your [employer]X account. Enter D876 in the "[employer] box to learn more about \"Back Stretches: Exercises. \"     If you do not have an account, please click on the \"Sign Up Now\" link. Current as of: March 21, 2017  Content Version: 11.5  © 9030-8266 Healthwise, [employer]orporated. Care instructions adapted under license by [facility] ([facility]). If you have questions about a medical condition or this instruction, always ask your healthcare professional. [address] any warranty or liability for your use of this information.

## 2018-03-21 ENCOUNTER — OFFICE VISIT (OUTPATIENT)
Dept: INFECTIOUS DISEASES | Age: 73
End: 2018-03-21

## 2018-03-21 ENCOUNTER — TELEPHONE (OUTPATIENT)
Dept: INTERNAL MEDICINE CLINIC | Age: 73
End: 2018-03-21

## 2018-03-21 VITALS
HEIGHT: 63 IN | HEART RATE: 70 BPM | TEMPERATURE: 98.6 F | SYSTOLIC BLOOD PRESSURE: 120 MMHG | DIASTOLIC BLOOD PRESSURE: 62 MMHG | OXYGEN SATURATION: 98 %

## 2018-03-21 DIAGNOSIS — Z96.659 INFECTION OF PROSTHETIC KNEE JOINT, SUBSEQUENT ENCOUNTER: Primary | ICD-10-CM

## 2018-03-21 DIAGNOSIS — T84.59XD INFECTION OF PROSTHETIC KNEE JOINT, SUBSEQUENT ENCOUNTER: Primary | ICD-10-CM

## 2018-03-21 DIAGNOSIS — T84.59XS INFECTED PROSTHETIC KNEE JOINT, SEQUELA: ICD-10-CM

## 2018-03-21 DIAGNOSIS — N18.6 ESRD (END STAGE RENAL DISEASE) (HCC): ICD-10-CM

## 2018-03-21 DIAGNOSIS — Z96.659 INFECTED PROSTHETIC KNEE JOINT, SEQUELA: ICD-10-CM

## 2018-03-21 PROCEDURE — 99213 OFFICE O/P EST LOW 20 MIN: CPT | Performed by: INTERNAL MEDICINE

## 2018-03-27 ENCOUNTER — OFFICE VISIT (OUTPATIENT)
Dept: ORTHOPEDIC SURGERY | Age: 73
End: 2018-03-27

## 2018-03-27 VITALS
RESPIRATION RATE: 16 BRPM | HEIGHT: 63 IN | DIASTOLIC BLOOD PRESSURE: 78 MMHG | TEMPERATURE: 98.8 F | BODY MASS INDEX: 42.52 KG/M2 | WEIGHT: 240 LBS | SYSTOLIC BLOOD PRESSURE: 132 MMHG

## 2018-03-27 DIAGNOSIS — M19.012 BILATERAL SHOULDER REGION ARTHRITIS: ICD-10-CM

## 2018-03-27 DIAGNOSIS — Z96.653 HISTORY OF BILATERAL KNEE ARTHROPLASTY: ICD-10-CM

## 2018-03-27 DIAGNOSIS — M25.562 PAIN IN BOTH KNEES, UNSPECIFIED CHRONICITY: Primary | ICD-10-CM

## 2018-03-27 DIAGNOSIS — M19.011 BILATERAL SHOULDER REGION ARTHRITIS: ICD-10-CM

## 2018-03-27 DIAGNOSIS — M25.561 PAIN IN BOTH KNEES, UNSPECIFIED CHRONICITY: Primary | ICD-10-CM

## 2018-03-27 PROCEDURE — 99212 OFFICE O/P EST SF 10 MIN: CPT | Performed by: PHYSICIAN ASSISTANT

## 2018-03-27 PROCEDURE — 20610 DRAIN/INJ JOINT/BURSA W/O US: CPT | Performed by: PHYSICIAN ASSISTANT

## 2018-03-29 ENCOUNTER — TELEPHONE (OUTPATIENT)
Dept: INTERNAL MEDICINE CLINIC | Age: 73
End: 2018-03-29

## 2018-03-29 PROBLEM — Z96.653 HISTORY OF BILATERAL KNEE ARTHROPLASTY: Status: ACTIVE | Noted: 2018-03-29

## 2018-04-17 ENCOUNTER — OFFICE VISIT (OUTPATIENT)
Dept: PAIN MANAGEMENT | Age: 73
End: 2018-04-17

## 2018-04-17 VITALS — HEART RATE: 71 BPM | OXYGEN SATURATION: 96 % | DIASTOLIC BLOOD PRESSURE: 68 MMHG | SYSTOLIC BLOOD PRESSURE: 119 MMHG

## 2018-04-17 DIAGNOSIS — I73.9 PERIPHERAL VASCULAR DISEASE (HCC): ICD-10-CM

## 2018-04-17 DIAGNOSIS — M51.37 DDD (DEGENERATIVE DISC DISEASE), LUMBOSACRAL: ICD-10-CM

## 2018-04-17 DIAGNOSIS — M17.11 OSTEOARTHRITIS OF RIGHT KNEE, UNSPECIFIED OSTEOARTHRITIS TYPE: ICD-10-CM

## 2018-04-17 DIAGNOSIS — M19.011 BILATERAL SHOULDER REGION ARTHRITIS: ICD-10-CM

## 2018-04-17 DIAGNOSIS — M48.061 SPINAL STENOSIS OF LUMBAR REGION, UNSPECIFIED WHETHER NEUROGENIC CLAUDICATION PRESENT: ICD-10-CM

## 2018-04-17 DIAGNOSIS — E66.01 MORBID OBESITY (HCC): ICD-10-CM

## 2018-04-17 DIAGNOSIS — M43.16 SPONDYLOLISTHESIS AT L4-L5 LEVEL: ICD-10-CM

## 2018-04-17 DIAGNOSIS — G89.4 CHRONIC PAIN SYNDROME: Primary | ICD-10-CM

## 2018-04-17 DIAGNOSIS — M19.012 BILATERAL SHOULDER REGION ARTHRITIS: ICD-10-CM

## 2018-04-17 DIAGNOSIS — M47.817 FACET ARTHROPATHY, LUMBOSACRAL: ICD-10-CM

## 2018-04-17 PROCEDURE — 99213 OFFICE O/P EST LOW 20 MIN: CPT | Performed by: NURSE PRACTITIONER

## 2018-04-17 RX ORDER — HYDROCODONE BITARTRATE AND ACETAMINOPHEN 7.5; 325 MG/1; MG/1
1 TABLET ORAL 2 TIMES DAILY PRN
Qty: 56 TABLET | Refills: 0 | Status: SHIPPED | OUTPATIENT
Start: 2018-04-17 | End: 2018-05-15 | Stop reason: SDUPTHER

## 2018-04-17 RX ORDER — DULOXETIN HYDROCHLORIDE 30 MG/1
30 CAPSULE, DELAYED RELEASE ORAL DAILY
Qty: 30 CAPSULE | Refills: 0 | Status: SHIPPED | OUTPATIENT
Start: 2018-04-17 | End: 2018-05-15 | Stop reason: SDUPTHER

## 2018-04-20 ENCOUNTER — OFFICE VISIT (OUTPATIENT)
Dept: PULMONOLOGY | Age: 73
End: 2018-04-20

## 2018-04-20 VITALS
TEMPERATURE: 98.1 F | OXYGEN SATURATION: 99 % | RESPIRATION RATE: 20 BRPM | DIASTOLIC BLOOD PRESSURE: 67 MMHG | HEART RATE: 78 BPM | WEIGHT: 235 LBS | SYSTOLIC BLOOD PRESSURE: 122 MMHG | BODY MASS INDEX: 41.64 KG/M2 | HEIGHT: 63 IN

## 2018-04-20 DIAGNOSIS — G47.33 OSA (OBSTRUCTIVE SLEEP APNEA): Primary | ICD-10-CM

## 2018-04-20 PROCEDURE — 99213 OFFICE O/P EST LOW 20 MIN: CPT | Performed by: INTERNAL MEDICINE

## 2018-04-20 ASSESSMENT — SLEEP AND FATIGUE QUESTIONNAIRES
HOW LIKELY ARE YOU TO NOD OFF OR FALL ASLEEP WHILE LYING DOWN TO REST IN THE AFTERNOON WHEN CIRCUMSTANCES PERMIT: 0
HOW LIKELY ARE YOU TO NOD OFF OR FALL ASLEEP WHILE SITTING INACTIVE IN A PUBLIC PLACE: 0
HOW LIKELY ARE YOU TO NOD OFF OR FALL ASLEEP WHILE SITTING AND READING: 0
HOW LIKELY ARE YOU TO NOD OFF OR FALL ASLEEP WHEN YOU ARE A PASSENGER IN A CAR FOR AN HOUR WITHOUT A BREAK: 0
HOW LIKELY ARE YOU TO NOD OFF OR FALL ASLEEP WHILE SITTING AND TALKING TO SOMEONE: 0
ESS TOTAL SCORE: 0
HOW LIKELY ARE YOU TO NOD OFF OR FALL ASLEEP IN A CAR, WHILE STOPPED FOR A FEW MINUTES IN TRAFFIC: 0
HOW LIKELY ARE YOU TO NOD OFF OR FALL ASLEEP WHILE SITTING QUIETLY AFTER LUNCH WITHOUT ALCOHOL: 0
HOW LIKELY ARE YOU TO NOD OFF OR FALL ASLEEP WHILE WATCHING TV: 0

## 2018-04-26 ENCOUNTER — TELEPHONE (OUTPATIENT)
Dept: SLEEP MEDICINE | Age: 73
End: 2018-04-26

## 2018-04-26 ENCOUNTER — OFFICE VISIT (OUTPATIENT)
Dept: ORTHOPEDIC SURGERY | Age: 73
End: 2018-04-26

## 2018-04-26 ENCOUNTER — TELEPHONE (OUTPATIENT)
Dept: INFECTIOUS DISEASES | Age: 73
End: 2018-04-26

## 2018-04-26 ENCOUNTER — TELEPHONE (OUTPATIENT)
Dept: ORTHOPEDIC SURGERY | Age: 73
End: 2018-04-26

## 2018-04-26 VITALS
WEIGHT: 235.01 LBS | DIASTOLIC BLOOD PRESSURE: 72 MMHG | SYSTOLIC BLOOD PRESSURE: 123 MMHG | BODY MASS INDEX: 41.64 KG/M2 | HEART RATE: 72 BPM | HEIGHT: 63 IN

## 2018-04-26 DIAGNOSIS — M48.062 LUMBAR STENOSIS WITH NEUROGENIC CLAUDICATION: ICD-10-CM

## 2018-04-26 DIAGNOSIS — M43.16 SPONDYLOLISTHESIS OF LUMBAR REGION: Primary | ICD-10-CM

## 2018-04-26 DIAGNOSIS — M51.36 DDD (DEGENERATIVE DISC DISEASE), LUMBAR: ICD-10-CM

## 2018-04-26 PROCEDURE — 99243 OFF/OP CNSLTJ NEW/EST LOW 30: CPT | Performed by: PHYSICAL MEDICINE & REHABILITATION

## 2018-05-08 ENCOUNTER — TELEPHONE (OUTPATIENT)
Dept: INTERNAL MEDICINE CLINIC | Age: 73
End: 2018-05-08

## 2018-05-08 ENCOUNTER — TELEPHONE (OUTPATIENT)
Dept: PAIN MANAGEMENT | Age: 73
End: 2018-05-08

## 2018-05-08 DIAGNOSIS — N18.6 ESRD (END STAGE RENAL DISEASE) (HCC): ICD-10-CM

## 2018-05-08 DIAGNOSIS — I10 ESSENTIAL HYPERTENSION: Primary | ICD-10-CM

## 2018-05-08 RX ORDER — BENZOCAINE/MENTHOL 6 MG-10 MG
1 LOZENGE MUCOUS MEMBRANE PRN
Qty: 1 EACH | Refills: 0 | Status: SHIPPED | OUTPATIENT
Start: 2018-05-08

## 2018-05-15 ENCOUNTER — OFFICE VISIT (OUTPATIENT)
Dept: PAIN MANAGEMENT | Age: 73
End: 2018-05-15

## 2018-05-15 VITALS — DIASTOLIC BLOOD PRESSURE: 60 MMHG | OXYGEN SATURATION: 96 % | SYSTOLIC BLOOD PRESSURE: 111 MMHG | HEART RATE: 66 BPM

## 2018-05-15 DIAGNOSIS — I73.9 PERIPHERAL VASCULAR DISEASE (HCC): ICD-10-CM

## 2018-05-15 DIAGNOSIS — M48.061 SPINAL STENOSIS OF LUMBAR REGION, UNSPECIFIED WHETHER NEUROGENIC CLAUDICATION PRESENT: ICD-10-CM

## 2018-05-15 DIAGNOSIS — M19.011 BILATERAL SHOULDER REGION ARTHRITIS: ICD-10-CM

## 2018-05-15 DIAGNOSIS — N18.6 ESRD (END STAGE RENAL DISEASE) (HCC): ICD-10-CM

## 2018-05-15 DIAGNOSIS — M17.11 OSTEOARTHRITIS OF RIGHT KNEE, UNSPECIFIED OSTEOARTHRITIS TYPE: ICD-10-CM

## 2018-05-15 DIAGNOSIS — M43.16 SPONDYLOLISTHESIS AT L4-L5 LEVEL: ICD-10-CM

## 2018-05-15 DIAGNOSIS — I10 ESSENTIAL HYPERTENSION: ICD-10-CM

## 2018-05-15 DIAGNOSIS — M47.817 FACET ARTHROPATHY, LUMBOSACRAL: ICD-10-CM

## 2018-05-15 DIAGNOSIS — M51.37 DDD (DEGENERATIVE DISC DISEASE), LUMBOSACRAL: ICD-10-CM

## 2018-05-15 DIAGNOSIS — G89.4 CHRONIC PAIN SYNDROME: Primary | ICD-10-CM

## 2018-05-15 DIAGNOSIS — E66.01 MORBID OBESITY (HCC): ICD-10-CM

## 2018-05-15 DIAGNOSIS — M19.012 BILATERAL SHOULDER REGION ARTHRITIS: ICD-10-CM

## 2018-05-15 PROCEDURE — 99213 OFFICE O/P EST LOW 20 MIN: CPT | Performed by: NURSE PRACTITIONER

## 2018-05-15 RX ORDER — BENZOCAINE/MENTHOL 6 MG-10 MG
1 LOZENGE MUCOUS MEMBRANE PRN
Qty: 1 EACH | Refills: 0 | OUTPATIENT
Start: 2018-05-15

## 2018-05-15 RX ORDER — DULOXETIN HYDROCHLORIDE 30 MG/1
30 CAPSULE, DELAYED RELEASE ORAL DAILY
Qty: 30 CAPSULE | Refills: 1 | Status: SHIPPED | OUTPATIENT
Start: 2018-05-15 | End: 2018-06-19 | Stop reason: SDUPTHER

## 2018-05-15 RX ORDER — HYDROCODONE BITARTRATE AND ACETAMINOPHEN 7.5; 325 MG/1; MG/1
1 TABLET ORAL 2 TIMES DAILY PRN
Qty: 60 TABLET | Refills: 0 | Status: SHIPPED | OUTPATIENT
Start: 2018-05-15 | End: 2018-06-19 | Stop reason: SDUPTHER

## 2018-05-30 ENCOUNTER — HOSPITAL ENCOUNTER (OUTPATIENT)
Dept: OTHER | Age: 73
Discharge: HOME | End: 2018-06-01
Attending: INTERNAL MEDICINE | Admitting: INTERNAL MEDICINE

## 2018-05-30 DIAGNOSIS — G47.33 OSA (OBSTRUCTIVE SLEEP APNEA): ICD-10-CM

## 2018-05-30 PROCEDURE — 95811 POLYSOM 6/>YRS CPAP 4/> PARM: CPT | Performed by: PSYCHIATRY & NEUROLOGY

## 2018-05-31 ENCOUNTER — TELEPHONE (OUTPATIENT)
Dept: INTERNAL MEDICINE CLINIC | Age: 73
End: 2018-05-31

## 2018-06-04 ENCOUNTER — TELEPHONE (OUTPATIENT)
Dept: PULMONOLOGY | Age: 73
End: 2018-06-04

## 2018-06-05 ENCOUNTER — TELEPHONE (OUTPATIENT)
Dept: INTERNAL MEDICINE CLINIC | Age: 73
End: 2018-06-05

## 2018-06-19 ENCOUNTER — OFFICE VISIT (OUTPATIENT)
Dept: PAIN MANAGEMENT | Age: 73
End: 2018-06-19

## 2018-06-19 VITALS — SYSTOLIC BLOOD PRESSURE: 101 MMHG | OXYGEN SATURATION: 97 % | DIASTOLIC BLOOD PRESSURE: 61 MMHG | HEART RATE: 75 BPM

## 2018-06-19 DIAGNOSIS — M19.011 BILATERAL SHOULDER REGION ARTHRITIS: ICD-10-CM

## 2018-06-19 DIAGNOSIS — M48.061 SPINAL STENOSIS OF LUMBAR REGION, UNSPECIFIED WHETHER NEUROGENIC CLAUDICATION PRESENT: ICD-10-CM

## 2018-06-19 DIAGNOSIS — M19.012 BILATERAL SHOULDER REGION ARTHRITIS: ICD-10-CM

## 2018-06-19 DIAGNOSIS — E66.01 MORBID OBESITY (HCC): ICD-10-CM

## 2018-06-19 DIAGNOSIS — M43.16 SPONDYLOLISTHESIS AT L4-L5 LEVEL: ICD-10-CM

## 2018-06-19 DIAGNOSIS — G89.4 CHRONIC PAIN SYNDROME: Primary | ICD-10-CM

## 2018-06-19 DIAGNOSIS — M17.11 OSTEOARTHRITIS OF RIGHT KNEE, UNSPECIFIED OSTEOARTHRITIS TYPE: ICD-10-CM

## 2018-06-19 DIAGNOSIS — M47.817 FACET ARTHROPATHY, LUMBOSACRAL: ICD-10-CM

## 2018-06-19 DIAGNOSIS — M51.37 DDD (DEGENERATIVE DISC DISEASE), LUMBOSACRAL: ICD-10-CM

## 2018-06-19 DIAGNOSIS — I73.9 PERIPHERAL VASCULAR DISEASE (HCC): ICD-10-CM

## 2018-06-19 PROCEDURE — 99213 OFFICE O/P EST LOW 20 MIN: CPT | Performed by: NURSE PRACTITIONER

## 2018-06-19 RX ORDER — DULOXETIN HYDROCHLORIDE 30 MG/1
30 CAPSULE, DELAYED RELEASE ORAL DAILY
Qty: 30 CAPSULE | Refills: 1 | Status: SHIPPED | OUTPATIENT
Start: 2018-06-19 | End: 2018-07-24 | Stop reason: SDUPTHER

## 2018-06-19 RX ORDER — HYDROCODONE BITARTRATE AND ACETAMINOPHEN 7.5; 325 MG/1; MG/1
1 TABLET ORAL 2 TIMES DAILY PRN
Qty: 60 TABLET | Refills: 0 | Status: SHIPPED | OUTPATIENT
Start: 2018-06-19 | End: 2018-07-24 | Stop reason: SDUPTHER

## 2018-06-21 ENCOUNTER — OFFICE VISIT (OUTPATIENT)
Dept: ORTHOPEDIC SURGERY | Age: 73
End: 2018-06-21

## 2018-06-21 VITALS
HEART RATE: 73 BPM | DIASTOLIC BLOOD PRESSURE: 54 MMHG | SYSTOLIC BLOOD PRESSURE: 87 MMHG | HEIGHT: 63 IN | WEIGHT: 240 LBS | TEMPERATURE: 95.5 F | BODY MASS INDEX: 42.52 KG/M2

## 2018-06-21 DIAGNOSIS — M19.012 BILATERAL SHOULDER REGION ARTHRITIS: Primary | ICD-10-CM

## 2018-06-21 DIAGNOSIS — M19.011 BILATERAL SHOULDER REGION ARTHRITIS: Primary | ICD-10-CM

## 2018-06-21 PROCEDURE — 20610 DRAIN/INJ JOINT/BURSA W/O US: CPT | Performed by: PHYSICIAN ASSISTANT

## 2018-06-26 ENCOUNTER — TELEPHONE (OUTPATIENT)
Dept: INTERNAL MEDICINE CLINIC | Age: 73
End: 2018-06-26

## 2018-07-02 ENCOUNTER — OFFICE VISIT (OUTPATIENT)
Dept: INTERNAL MEDICINE CLINIC | Age: 73
End: 2018-07-02

## 2018-07-02 VITALS
SYSTOLIC BLOOD PRESSURE: 130 MMHG | BODY MASS INDEX: 41.64 KG/M2 | DIASTOLIC BLOOD PRESSURE: 70 MMHG | TEMPERATURE: 98 F | HEART RATE: 66 BPM | HEIGHT: 63 IN | OXYGEN SATURATION: 99 % | WEIGHT: 235 LBS

## 2018-07-02 DIAGNOSIS — E55.9 VITAMIN D DEFICIENCY: ICD-10-CM

## 2018-07-02 DIAGNOSIS — Z83.3 FAMILY HISTORY OF DIABETES MELLITUS: ICD-10-CM

## 2018-07-02 DIAGNOSIS — F17.200 TOBACCO DEPENDENCE: ICD-10-CM

## 2018-07-02 DIAGNOSIS — N18.6 ESRD (END STAGE RENAL DISEASE) (HCC): ICD-10-CM

## 2018-07-02 DIAGNOSIS — M81.8 OTHER OSTEOPOROSIS WITHOUT CURRENT PATHOLOGICAL FRACTURE: ICD-10-CM

## 2018-07-02 DIAGNOSIS — E78.49 OTHER HYPERLIPIDEMIA: ICD-10-CM

## 2018-07-02 DIAGNOSIS — I10 ESSENTIAL HYPERTENSION: Primary | ICD-10-CM

## 2018-07-02 DIAGNOSIS — R91.1 PULMONARY NODULE: ICD-10-CM

## 2018-07-02 LAB
GLUCOSE BLD-MCNC: 108 MG/DL
HBA1C MFR BLD: 5.5 %

## 2018-07-02 PROCEDURE — 83036 HEMOGLOBIN GLYCOSYLATED A1C: CPT | Performed by: INTERNAL MEDICINE

## 2018-07-02 PROCEDURE — 99214 OFFICE O/P EST MOD 30 MIN: CPT | Performed by: INTERNAL MEDICINE

## 2018-07-02 PROCEDURE — 82962 GLUCOSE BLOOD TEST: CPT | Performed by: INTERNAL MEDICINE

## 2018-07-02 RX ORDER — NICOTINE 21 MG/24HR
1 PATCH, TRANSDERMAL 24 HOURS TRANSDERMAL EVERY 24 HOURS
Qty: 30 PATCH | Refills: 1 | Status: SHIPPED | OUTPATIENT
Start: 2018-07-02 | End: 2019-02-05 | Stop reason: ALTCHOICE

## 2018-07-02 RX ORDER — PRAVASTATIN SODIUM 40 MG
40 TABLET ORAL DAILY
Qty: 30 TABLET | Refills: 3 | Status: SHIPPED | OUTPATIENT
Start: 2018-07-02 | End: 2018-10-02 | Stop reason: SDUPTHER

## 2018-07-02 RX ORDER — ALENDRONATE SODIUM 70 MG/1
70 TABLET ORAL
Qty: 4 TABLET | Refills: 3 | Status: SHIPPED | OUTPATIENT
Start: 2018-07-02 | End: 2018-10-02 | Stop reason: SDUPTHER

## 2018-07-02 ASSESSMENT — PATIENT HEALTH QUESTIONNAIRE - PHQ9
1. LITTLE INTEREST OR PLEASURE IN DOING THINGS: 0
2. FEELING DOWN, DEPRESSED OR HOPELESS: 0
SUM OF ALL RESPONSES TO PHQ QUESTIONS 1-9: 0
SUM OF ALL RESPONSES TO PHQ9 QUESTIONS 1 & 2: 0

## 2018-07-02 NOTE — PROGRESS NOTES
SUBJECTIVE:  Fe Silva is a 74 y.o. female [address] [facility] Complaint   Patient presents with    Hypertension    Other        PT HERE FOR EVAL    HTN -  ON MEDS . NO HEADACHE , DIZZINESS No.   HLP -  TAKING PRAVACHOL. EXERCISE / DIET COMPLIANCE . NO MUSCLE ACHES. LABS D/W PT  VIT D DEF -  MED D/CAMILO PER RENAL. LABS D/W PT  PULM. NODULE - NOTED ON CT. FOLLOW UP STUDY D/W PT   STILL SMOKING - CESSATION READDRESSED. PT REQUESTING NICOTINE PATCHES  OSTEOPOROSIS - TAKING MED. REQUESTING MED REFILL  FH DM - REQUESTING EVAL  ESRD - ON DIALYSIS- M-W-FR. TOLERATING. FOLLOWING WITH NEPHROLOGY    DENIES CP, No SOB, No PALPITATIONS, No COUGH  No ABD PAIN, No N/V, No DIARRHEA, No CONSTIPATION, No MELENA, No HEMATOCHEZIA. No DYSURIA, No FREQ, No URGENCY, No HEMATURIA    PMH: REVIEWED AND UPDATED TODAY    PSH: REVIEWED AND UPDATED TODAY    SOCIAL HX: REVIEWED AND UPDATED TODAY    ALLERGY:  Iv dye [iodides]; Lisinopril; Peanut oil; Hydralazine; and Lipitor [atorvastatin]    MEDS: REVIEWED  Prior to Visit Medications    Medication Sig Taking? Authorizing Provider   HYDROcodone-acetaminophen (NORCO) 7.5-325 MG per tablet Take 1 tablet by mouth 2 times daily as needed for Pain for up to 30 days. TORSTEN Marks CNP   Blood Pressure Monitor MISC 1 Device by Does not apply route as needed (prn) Yes Alicia Palacios MD   nicotine (NICODERM CQ) 7 MG/24HR Place 1 patch onto the skin every 24 hours Yes Alicia Palacios MD   alendronate (FOSAMAX) 70 MG tablet Take 1 tablet by mouth every 7 days Yes Alicia Palacios MD   Blood Pressure Monitoring (5 SERIES BP MONITOR) RACHELL 1 Device by Does not apply route as needed (PRN) Yes Alicia Palacios MD   pravastatin (PRAVACHOL) 40 MG tablet Take 1 tablet by mouth daily Yes TORSTEN Dillon CNP   aspirin EC 81 MG EC tablet Take 1 tablet by mouth daily Yes TORSTEN Dillon CNP   cloNIDine (CATAPRES) 0.2 MG tablet Take 0.2 mg by mouth 3 times daily Yes Historical hypertension  COUNSELLED. CONTINUE MEDS. LOW NA+ / DASH DIET/ EXERCISE. MONITOR. GOAL </= 120/80  MAKE CHANGES AS NEEDED. 2. Other hyperlipidemia  COUNSELLED. CONTINUE PRAVACHOL 40 MG . ADVISED LOW FAT / CHOL DIET/ EXERCISE.  MONITOR. F/U LABS  GOALS D/W PT.  MAKE CHANGES AS NEEDED. 3. Vitamin D deficiency  COUNSELLED. MONITOR OFF MED. MAKE CHANGES AS NEEDED. 4. Pulmonary nodule  COUNSELLED. FOLLOW UP CT TO EVAL  ADVISED SMOKING CESSATION. MONITOR  MAKE CHANGES AS NEEDED. 5. Tobacco dependence  Smoking cessation was encouraged. Cessation techniques reviewed today. COUNSELLED. CESSATION ADVISED   COMPLICATIONS OF TOBACCO USE INCLUDING COPD, CANCER, CAD D/W PT  PT VERBALIZED UNDERSTANDING   nicotine (NICODERM CQ) 14 MG/24HR ORDERED  ADVISED TO SET QUIT DATE  MAKE CHANGES AS NEEDED. 6. Other osteoporosis without current pathological fracture  COUNSELLED. FOSAMAX 70 MG REFILLED  ADVISED MOE/ VIT D. EXERCISES. MONITOR. MAKE CHANGES AS NEEDED. 7. Family history of diabetes mellitus  COUNSELLED. LAB -VE FOR DM. ADVISED ON DIET / EXERCISE. ADVISED RISK FACTOR MODIFICATION. MAKE CHANGES AS NEEDED. 8. ESRD (end stage renal disease) ([facility] [address]  COUNSELLED. AVOID NSAIDS. MONITOR. PT ON HEMODIALYSIS (HD). F/U WITH NEPHROLOGY. MAKE CHANGES AS NEEDED. Quality & Risk Score Accuracy    Visit Dx:  M86.60 - Chronic infection of bone ([facility]  Assessment and plan:  FOLLOWING WITH ID, ORTHO / SURGERY  Last edited 07/02/18 15:53 EDT by Pool Mann MD             Maegan received counseling on the following healthy behaviors: nutrition, exercise, medication adherence and tobacco cessation    Patient given educational materials on Hyperlipidemia, Smoking Cessation, Nutrition, Exercise and Hypertension    I have instructed Maegan to complete a self tracking handout on Blood Pressures , Weights and Smoking and instructed them to bring it with them to her next appointment.

## 2018-07-17 DIAGNOSIS — E78.49 OTHER HYPERLIPIDEMIA: ICD-10-CM

## 2018-07-17 DIAGNOSIS — I10 ESSENTIAL HYPERTENSION: ICD-10-CM

## 2018-07-17 DIAGNOSIS — E55.9 VITAMIN D DEFICIENCY: ICD-10-CM

## 2018-07-17 LAB
ALBUMIN SERPL-MCNC: 4.3 G/DL (ref 3.4–5)
ALP BLD-CCNC: 104 U/L (ref 40–129)
ALT SERPL-CCNC: 6 U/L (ref 10–40)
AST SERPL-CCNC: 12 U/L (ref 15–37)
BILIRUB SERPL-MCNC: 0.3 MG/DL (ref 0–1)
BILIRUBIN DIRECT: <0.2 MG/DL (ref 0–0.3)
BILIRUBIN, INDIRECT: ABNORMAL MG/DL (ref 0–1)
CHOLESTEROL, TOTAL: 142 MG/DL (ref 0–199)
HDLC SERPL-MCNC: 45 MG/DL (ref 40–60)
LDL CHOLESTEROL CALCULATED: 79 MG/DL
TOTAL PROTEIN: 7.4 G/DL (ref 6.4–8.2)
TRIGL SERPL-MCNC: 92 MG/DL (ref 0–150)
VITAMIN D 25-HYDROXY: 14.6 NG/ML
VLDLC SERPL CALC-MCNC: 18 MG/DL

## 2018-07-24 ENCOUNTER — OFFICE VISIT (OUTPATIENT)
Dept: PAIN MANAGEMENT | Age: 73
End: 2018-07-24

## 2018-07-24 VITALS — HEART RATE: 74 BPM | SYSTOLIC BLOOD PRESSURE: 111 MMHG | DIASTOLIC BLOOD PRESSURE: 62 MMHG | OXYGEN SATURATION: 96 %

## 2018-07-24 DIAGNOSIS — M47.817 FACET ARTHROPATHY, LUMBOSACRAL: ICD-10-CM

## 2018-07-24 DIAGNOSIS — M43.16 SPONDYLOLISTHESIS AT L4-L5 LEVEL: ICD-10-CM

## 2018-07-24 DIAGNOSIS — M51.37 DDD (DEGENERATIVE DISC DISEASE), LUMBOSACRAL: ICD-10-CM

## 2018-07-24 DIAGNOSIS — E66.01 MORBID OBESITY (HCC): ICD-10-CM

## 2018-07-24 DIAGNOSIS — M19.012 BILATERAL SHOULDER REGION ARTHRITIS: ICD-10-CM

## 2018-07-24 DIAGNOSIS — G89.4 CHRONIC PAIN SYNDROME: Primary | ICD-10-CM

## 2018-07-24 DIAGNOSIS — M17.11 OSTEOARTHRITIS OF RIGHT KNEE, UNSPECIFIED OSTEOARTHRITIS TYPE: ICD-10-CM

## 2018-07-24 DIAGNOSIS — M19.011 BILATERAL SHOULDER REGION ARTHRITIS: ICD-10-CM

## 2018-07-24 DIAGNOSIS — M48.061 SPINAL STENOSIS OF LUMBAR REGION, UNSPECIFIED WHETHER NEUROGENIC CLAUDICATION PRESENT: ICD-10-CM

## 2018-07-24 PROCEDURE — 99213 OFFICE O/P EST LOW 20 MIN: CPT | Performed by: NURSE PRACTITIONER

## 2018-07-24 RX ORDER — HYDROCODONE BITARTRATE AND ACETAMINOPHEN 7.5; 325 MG/1; MG/1
1 TABLET ORAL 2 TIMES DAILY PRN
Qty: 60 TABLET | Refills: 0 | Status: SHIPPED | OUTPATIENT
Start: 2018-07-24 | End: 2018-08-28 | Stop reason: SDUPTHER

## 2018-07-24 RX ORDER — DULOXETIN HYDROCHLORIDE 30 MG/1
30 CAPSULE, DELAYED RELEASE ORAL DAILY
Qty: 30 CAPSULE | Refills: 1 | Status: SHIPPED | OUTPATIENT
Start: 2018-07-24 | End: 2018-08-28 | Stop reason: SDUPTHER

## 2018-07-24 NOTE — PROGRESS NOTES
reviewed     MEDICATIONS: Ms. Artur Negrete list of medications were reviewed. Her current medications are   Outpatient Medications Prior to Visit   Medication Sig Dispense Refill    alendronate (FOSAMAX) 70 MG tablet Take 1 tablet by mouth every 7 days 4 tablet 3    pravastatin (PRAVACHOL) 40 MG tablet Take 1 tablet by mouth daily 30 tablet 3    nicotine (NICODERM CQ) 14 MG/24HR Place 1 patch onto the skin every 24 hours 30 patch 1    Blood Pressure Monitor MISC 1 Device by Does not apply route as needed (prn) 1 each 0    Blood Pressure Monitoring (5 SERIES BP MONITOR) RACHELL 1 Device by Does not apply route as needed (PRN) 1 Device 0    aspirin EC 81 MG EC tablet Take 1 tablet by mouth daily 30 tablet 3    doxycycline hyclate (VIBRA-TABS) 100 MG tablet TAKE 1 TABLET BY MOUTH TWICE DAILY 60 tablet 0    cloNIDine (CATAPRES) 0.2 MG tablet Take 0.2 mg by mouth 3 times daily      albuterol sulfate HFA (PROAIR HFA) 108 (90 Base) MCG/ACT inhaler Inhale 2 puffs into the lungs 4 times daily as needed for Wheezing 1 Inhaler 0    calcium acetate (PHOSLO) 667 MG capsule Take by mouth 3 times daily (with meals)      minoxidil (LONITEN) 2.5 MG tablet TAKE 1 TABLET BY MOUTH TWICE DAILY 60 tablet 5    amLODIPine (NORVASC) 10 MG tablet Take 10 mg by mouth daily      DULoxetine (CYMBALTA) 30 MG extended release capsule Take 1 capsule by mouth daily 30 capsule 1    diclofenac sodium (VOLTAREN) 1 % GEL Apply 2-4 g topically 2 times daily 5 Tube 0     No facility-administered medications prior to visit. SOCIAL/FAMILY/PAST MEDICAL HISTORY: Ms. Liam Bocanegra, family and past medical history was reviewed. REVIEW OF SYSTEMS:    Respiratory: Negative for apnea, chest tightness and shortness of breath or change in baseline breathing. Gastrointestinal: Negative for nausea, vomiting, abdominal pain, diarrhea, constipation, blood in stool and abdominal distention. PHYSICAL EXAM:   Nursing note and vitals reviewed.

## 2018-07-26 ENCOUNTER — TELEPHONE (OUTPATIENT)
Dept: CARDIOLOGY CLINIC | Age: 73
End: 2018-07-26

## 2018-07-26 ENCOUNTER — OFFICE VISIT (OUTPATIENT)
Dept: CARDIOLOGY CLINIC | Age: 73
End: 2018-07-26

## 2018-07-26 VITALS
SYSTOLIC BLOOD PRESSURE: 106 MMHG | WEIGHT: 239 LBS | DIASTOLIC BLOOD PRESSURE: 56 MMHG | HEART RATE: 60 BPM | BODY MASS INDEX: 42.34 KG/M2

## 2018-07-26 DIAGNOSIS — I73.9 PERIPHERAL VASCULAR DISEASE (HCC): ICD-10-CM

## 2018-07-26 DIAGNOSIS — E78.00 HYPERCHOLESTEROLEMIA: ICD-10-CM

## 2018-07-26 DIAGNOSIS — I10 ESSENTIAL HYPERTENSION: Primary | ICD-10-CM

## 2018-07-26 PROCEDURE — 99214 OFFICE O/P EST MOD 30 MIN: CPT | Performed by: INTERNAL MEDICINE

## 2018-07-26 NOTE — PROGRESS NOTES
01/12/2016    Dr. Linden Adams - RLE angiogram prior to revision knee arthroplasty    REVISION TOTAL KNEE ARTHROPLASTY Right 06/20/2016    Dr. Marlen Acevedo - I&D w/repeat Prostalac spacer implant     REVISION TOTAL KNEE ARTHROPLASTY Right 11/15/2016    Dr. Marlen Acevedo - staged revision due to infection    TOTAL KNEE ARTHROPLASTY Right 2007    Dr. Allan Nurse Left 2008    Dr. Kati Lester     SH:       History   Smoking Status    Current Every Day Smoker    Packs/day: 0.25    Years: 54.00    Types: Cigarettes    Start date: 1/1/1962    Last attempt to quit: 3/20/2018   Smokeless Tobacco    Never Used     Current Medications:  Prior to Visit Medications    Medication Sig Taking? Authorizing Provider   DULoxetine (CYMBALTA) 30 MG extended release capsule Take 1 capsule by mouth daily Yes TORSTEN Alfaro CNP   HYDROcodone-acetaminophen (NORCO) 7.5-325 MG per tablet Take 1 tablet by mouth 2 times daily as needed for Pain for up to 30 days. TORSTEN Kellogg CNP   diclofenac sodium (VOLTAREN) 1 % GEL Apply 2-4 g topically 2 times daily Yes TORSTEN Alfaro CNP   alendronate (FOSAMAX) 70 MG tablet Take 1 tablet by mouth every 7 days Yes Carson Penny MD   pravastatin (PRAVACHOL) 40 MG tablet Take 1 tablet by mouth daily Yes Carson Penny MD   nicotine (NICODERM CQ) 14 MG/24HR Place 1 patch onto the skin every 24 hours Yes Carson Penny MD   Blood Pressure Monitor MISC 1 Device by Does not apply route as needed (prn) Yes Carson Penny MD   Blood Pressure Monitoring (5 SERIES BP MONITOR) RACHELL 1 Device by Does not apply route as needed (PRN) Yes Carson Penny MD   aspirin EC 81 MG EC tablet Take 1 tablet by mouth daily Yes TORSTEN Sung CNP   doxycycline hyclate (VIBRA-TABS) 100 MG tablet TAKE 1 TABLET BY MOUTH TWICE DAILY Yes Beatrice Dickerson MD   cloNIDine (CATAPRES) 0.2 MG tablet Take 0.2 mg by mouth 3 times daily Yes Historical Provider, MD   albuterol sulfate HFA (PROAIR HFA) 108 (90 Base) MCG/ACT inhaler Inhale 2 puffs into the lungs 4 times daily as needed for Wheezing Yes Mary Ann Pang MD   calcium acetate (PHOSLO) 667 MG capsule Take by mouth 3 times daily (with meals) Yes Historical Provider, MD   minoxidil (LONITEN) 2.5 MG tablet TAKE 1 TABLET BY MOUTH TWICE DAILY Yes Kojo Wilson MD   amLODIPine (NORVASC) 10 MG tablet Take 10 mg by mouth daily Yes Historical Provider, MD     Family History  Family History   Problem Relation Age of Onset    Stroke Mother     Hypertension Mother     Diabetes Father     Diabetes Sister     Osteoarthritis Maternal Grandmother           · ROS:   · Cardiovascular: Reviewed in HPI  · Allergic/Immunologic: No nasal congestion or hives. · All other ROS are reviewed and are unremarkable. PHYSICAL EXAM:      Wt Readings from Last 3 Encounters:   07/26/18 239 lb (108.4 kg)   07/02/18 235 lb (106.6 kg)   06/21/18 240 lb (108.9 kg)       BP Readings from Last 3 Encounters:   07/26/18 (!) 106/56   07/24/18 111/62   07/02/18 130/70       Pulse Readings from Last 3 Encounters:   07/26/18 60   07/24/18 74   07/02/18 66     Exam   ENT: neg   NEUROLOGIC:  Neg   PSYCH: neg  SKIN: Warm and dry. LUNGS:  No increased work of breathing and clear to auscultation, no crackles or wheezing  CARDIOVASCULAR: RRR  ABDOMEN: soft large   EXT:<1+     Assessment:   Carotid stenosis   Us carotid   us 2/20/18 similar to last us of carotid on 11/8/16   The right internal carotid artery appears to have a 50-79% diameter reducing   stenosis based on velocity criteria. The right vertebral artery demonstrates normal antegrade flow. Left Impression   The left internal carotid artery appears to have a 50-79% diameter reducing   stenosis based on velocity criteria. The left vertebral artery demonstrates normal antegrade flow. CT of neck/brain 2/28/18   CTA of the head:    1.  Diminutive right A1 segment without focal occlusion.

## 2018-07-26 NOTE — TELEPHONE ENCOUNTER
Called and spoke with the patient and everything has been took care of, patient will call if she needs anything else.

## 2018-07-26 NOTE — TELEPHONE ENCOUNTER
MsTal Iqra Wise called about appt w/Dr. Mireille Ryan. Appt was made for 10/25/18 @ 9:00. She also stated Dr. Mireille Ryan wanted her to see Dr. Trevor Hercules. She requested this appt be made on a Tues or Thurs.

## 2018-07-31 ENCOUNTER — TELEPHONE (OUTPATIENT)
Dept: INTERNAL MEDICINE CLINIC | Age: 73
End: 2018-07-31

## 2018-08-02 ENCOUNTER — TELEPHONE (OUTPATIENT)
Dept: INTERNAL MEDICINE CLINIC | Age: 73
End: 2018-08-02

## 2018-08-02 NOTE — TELEPHONE ENCOUNTER
Patient is needing a tooth pulled and needs clearance from Dr. Freddy Chen    Would she need to be scheduled for an appt?      Call back [phone]

## 2018-08-02 NOTE — TELEPHONE ENCOUNTER
IF EXTENSIVE PROCEDURE  WILL NEED TO BE SEEN FOR PREOP  PT NEEDS TO INFORM CARDIOLOGY OF PLANNED PROCEDURE

## 2018-08-03 ENCOUNTER — TELEPHONE (OUTPATIENT)
Dept: CARDIOLOGY CLINIC | Age: 73
End: 2018-08-03

## 2018-08-03 ENCOUNTER — TELEPHONE (OUTPATIENT)
Dept: INTERNAL MEDICINE CLINIC | Age: 73
End: 2018-08-03

## 2018-08-03 NOTE — TELEPHONE ENCOUNTER
hx ESRD/ HD Caroid disease, CM EF wnl  ZULEMA  DDD chronic pain, obesity      Chronic renal failure on hemodialysis.         She is moderate cardiac risk for surgeries / during perioperative period   Will follow her if needed  [address] did not see pt for over a year

## 2018-08-03 NOTE — TELEPHONE ENCOUNTER
Pt is needing to have a dental procedure. She will be having teeth extracted. She needs clearance from her cardiologist to have it done. It will not be scheduled until she is cleared.       LOV 7/26/18    [address] [place] [address]   Ph:  [phone]    Pt [facility]:  [phone]

## 2018-08-03 NOTE — TELEPHONE ENCOUNTER
Patient would like to know the status of form for     [facility]     Must be completed for patient to continue to get her rides to Dialysis    N # [phone]

## 2018-08-06 ENCOUNTER — TELEPHONE (OUTPATIENT)
Dept: INTERNAL MEDICINE CLINIC | Age: 73
End: 2018-08-06

## 2018-08-07 ENCOUNTER — TELEPHONE (OUTPATIENT)
Dept: INTERNAL MEDICINE CLINIC | Age: 73
End: 2018-08-07

## 2018-08-07 NOTE — TELEPHONE ENCOUNTER
I called Brinda back at [facility] and she stated that pt pcp need to fill out form for transportation

## 2018-08-10 ENCOUNTER — TELEPHONE (OUTPATIENT)
Dept: INTERNAL MEDICINE CLINIC | Age: 73
End: 2018-08-10

## 2018-08-13 ENCOUNTER — TELEPHONE (OUTPATIENT)
Dept: PULMONOLOGY | Age: 73
End: 2018-08-13

## 2018-08-13 NOTE — TELEPHONE ENCOUNTER
Spoke with Rosio Pope at [employer]. Said that Gogo Crawley is a [facility] patient and that Sai Riley at [address] [facility] had been working on this. I called the [address] [place] [address] location and got [facility]. [facility] looked over the chart and indicated that Gogo Crawley went with another company. I called Radha. She said she only confirmed [facility] and does not know why they are saying this.   Called Pauline and she will look into this and check the PA and call back tomorrow

## 2018-08-14 NOTE — TELEPHONE ENCOUNTER
Shayna Hood from [address] called back to let us know that the reason Rhys Bullock was not set up on cpap was due to the fact that the PA was denied from [place]. I asked that she please call Maegan to explain this circumstance to her. She also said they would try again to get the PA approved and let us know back what the decision is.

## 2018-08-28 ENCOUNTER — OFFICE VISIT (OUTPATIENT)
Dept: PAIN MANAGEMENT | Age: 73
End: 2018-08-28

## 2018-08-28 VITALS
HEART RATE: 74 BPM | WEIGHT: 241 LBS | SYSTOLIC BLOOD PRESSURE: 106 MMHG | DIASTOLIC BLOOD PRESSURE: 71 MMHG | BODY MASS INDEX: 42.69 KG/M2 | OXYGEN SATURATION: 96 %

## 2018-08-28 DIAGNOSIS — M47.817 FACET ARTHROPATHY, LUMBOSACRAL: ICD-10-CM

## 2018-08-28 DIAGNOSIS — M19.012 BILATERAL SHOULDER REGION ARTHRITIS: ICD-10-CM

## 2018-08-28 DIAGNOSIS — G89.4 CHRONIC PAIN SYNDROME: ICD-10-CM

## 2018-08-28 DIAGNOSIS — M43.16 SPONDYLOLISTHESIS AT L4-L5 LEVEL: ICD-10-CM

## 2018-08-28 DIAGNOSIS — M17.11 OSTEOARTHRITIS OF RIGHT KNEE, UNSPECIFIED OSTEOARTHRITIS TYPE: ICD-10-CM

## 2018-08-28 DIAGNOSIS — M19.011 BILATERAL SHOULDER REGION ARTHRITIS: ICD-10-CM

## 2018-08-28 DIAGNOSIS — M51.37 DDD (DEGENERATIVE DISC DISEASE), LUMBOSACRAL: ICD-10-CM

## 2018-08-28 DIAGNOSIS — E66.01 MORBID OBESITY (HCC): ICD-10-CM

## 2018-08-28 DIAGNOSIS — M48.061 SPINAL STENOSIS OF LUMBAR REGION, UNSPECIFIED WHETHER NEUROGENIC CLAUDICATION PRESENT: ICD-10-CM

## 2018-08-28 PROCEDURE — 99213 OFFICE O/P EST LOW 20 MIN: CPT | Performed by: NURSE PRACTITIONER

## 2018-08-28 RX ORDER — DULOXETIN HYDROCHLORIDE 30 MG/1
30 CAPSULE, DELAYED RELEASE ORAL DAILY
Qty: 30 CAPSULE | Refills: 1 | Status: SHIPPED | OUTPATIENT
Start: 2018-08-28 | End: 2018-10-23 | Stop reason: SDUPTHER

## 2018-08-28 RX ORDER — HYDROCODONE BITARTRATE AND ACETAMINOPHEN 7.5; 325 MG/1; MG/1
1 TABLET ORAL 2 TIMES DAILY PRN
Qty: 60 TABLET | Refills: 0 | Status: SHIPPED | OUTPATIENT
Start: 2018-08-28 | End: 2018-09-25 | Stop reason: SDUPTHER

## 2018-08-28 NOTE — PROGRESS NOTES
Damari Garcia  1945  [id number]      HISTORY OF PRESENT ILLNESS: Ms. Houston Bruno is a 74 y.o. female returns for a follow up visit for pain management  She has a diagnosis of   1. Chronic pain syndrome    2. DDD (degenerative disc disease), lumbosacral    3. Facet arthropathy, lumbosacral (HCC)    4. Spondylolisthesis at L4-L5 level    5. Spinal stenosis of lumbar region, unspecified whether neurogenic claudication present    6. Bilateral shoulder region arthritis    7. Osteoarthritis of right knee, unspecified osteoarthritis type    8. Morbid obesity ([address]    . As per Information Obtained from the PADT (Patient Assessment and Documentation Tool)    She complains of pain in the shoulders, lower back left knee. She rates the pain 8/10 and describes it as aching, burning with increased physical activities. Current treatment regimen has helped relieve about 60% of the pain. She denies any side effects from the current pain regimen. Patient reports that since the last follow up visit the physical functioning is unchanged, family/social relationships are unchanged, mood is unchanged sleep patterns are unchanged, and that the overall functioning is unchanged. Patient denies misusing/abusing her narcotic pain medications or using any illegal drugs. Upon obtaining medical history from Ms. Houston Bruno states that pain is manageable on current pain therapy. Currently on antibiotics after having her teeth extracted 3 weeks ago. Reports that she had gone back to her dentist last week and was placed on antibiotics after experiencing some swelling post tooth extraction. Symptoms seem to be improving, pain medications helped with pain. She was given mainly Ibuprofen post teeth extraction. She has not had ILIANA of the lumbar spine with Dr. Cassidy Nance, and is waiting for a call back. Mood is stable without anxiety. Sleep is fair with an average of 5-6 hours. Denies to having issues of constipation.  Tolerating activities/house chores with moderate tenderness to the lower back. ALLERGIES: Patients list of allergies were reviewed     MEDICATIONS: Ms. Johnnie Ponce list of medications were reviewed. Her current medications are   Outpatient Medications Prior to Visit   Medication Sig Dispense Refill    diclofenac sodium (VOLTAREN) 1 % GEL Apply 2-4 g topically 2 times daily 5 Tube 0    alendronate (FOSAMAX) 70 MG tablet Take 1 tablet by mouth every 7 days 4 tablet 3    pravastatin (PRAVACHOL) 40 MG tablet Take 1 tablet by mouth daily 30 tablet 3    nicotine (NICODERM CQ) 14 MG/24HR Place 1 patch onto the skin every 24 hours 30 patch 1    Blood Pressure Monitor MISC 1 Device by Does not apply route as needed (prn) 1 each 0    Blood Pressure Monitoring (5 SERIES BP MONITOR) RACHELL 1 Device by Does not apply route as needed (PRN) 1 Device 0    aspirin EC 81 MG EC tablet Take 1 tablet by mouth daily 30 tablet 3    doxycycline hyclate (VIBRA-TABS) 100 MG tablet TAKE 1 TABLET BY MOUTH TWICE DAILY 60 tablet 0    cloNIDine (CATAPRES) 0.2 MG tablet Take 0.2 mg by mouth 3 times daily      albuterol sulfate HFA (PROAIR HFA) 108 (90 Base) MCG/ACT inhaler Inhale 2 puffs into the lungs 4 times daily as needed for Wheezing 1 Inhaler 0    calcium acetate (PHOSLO) 667 MG capsule Take by mouth 3 times daily (with meals)      minoxidil (LONITEN) 2.5 MG tablet TAKE 1 TABLET BY MOUTH TWICE DAILY 60 tablet 5    amLODIPine (NORVASC) 10 MG tablet Take 10 mg by mouth daily      DULoxetine (CYMBALTA) 30 MG extended release capsule Take 1 capsule by mouth daily 30 capsule 1     No facility-administered medications prior to visit. SOCIAL/FAMILY/PAST MEDICAL HISTORY: Ms. Man Smalls, family and past medical history was reviewed. REVIEW OF SYSTEMS:    Respiratory: Negative for apnea, chest tightness and shortness of breath or change in baseline breathing.     Gastrointestinal: Negative for nausea, vomiting, abdominal pain, diarrhea, constipation, blood in stool and abdominal distention. PHYSICAL EXAM:   Nursing note and vitals reviewed. /71   Pulse 74   Wt 241 lb (109.3 kg)   LMP 01/21/1964   SpO2 96%   BMI 42.69 kg/m²   Constitutional: She appears well-developed and well-nourished. No acute distress. Skin: Skin is warm and dry, good turgor. No rash noted. She is not diaphoretic. Cardiovascular: Normal rate, regular rhythm, normal heart sounds, and does not have murmur. Pulmonary/Chest: Effort normal. No respiratory distress. She does not have wheezes in the lung fields. She has no rales. Neurological/Psychiatric:She is alert and oriented to person, place, and time. Coordination is  Normal. Came in by wheel chair. Her mood isAppropriate and affect is Neutral/Euthymic(normal) . IMPRESSION:   1. Chronic pain syndrome    2. DDD (degenerative disc disease), lumbosacral    3. Facet arthropathy, lumbosacral (HCC)    4. Spondylolisthesis at L4-L5 level    5. Spinal stenosis of lumbar region, unspecified whether neurogenic claudication present    6. Bilateral shoulder region arthritis    7. Osteoarthritis of right knee, unspecified osteoarthritis type    8. Morbid obesity ([facility]        PLAN:  Informed verbal consent was obtained  -Continue with Cymbalta, V. Gel, Norco  -Jeimy exercises  -Continue to f/u with Dr. Kimber Lennox for DDD lumbar spine orthopedics for OA shoulders/knees  -She was advised weight reduction, diet changes- 800-1200 sierra diet, diet diary, exercising, nutritional  consult increased physical activity as tolerated  -CBT techniques- relaxation therapies such as biofeedback, mindfulness based stress reduction, imagery, cognitive restructuring, problem solving discussed with patient  -Last UDS consistent  -Return in about 4 weeks (around 9/25/2018).      Current Outpatient Prescriptions   Medication Sig Dispense Refill    DULoxetine (CYMBALTA) 30 MG extended release capsule Take 1 capsule by mouth daily 30 capsule 1    HYDROcodone-acetaminophen (NORCO) 7.5-325 MG per tablet Take 1 tablet by mouth 2 times daily as needed for Pain for up to 30 days. . 60 tablet 0    diclofenac sodium (VOLTAREN) 1 % GEL Apply 2-4 g topically 2 times daily 5 Tube 0    alendronate (FOSAMAX) 70 MG tablet Take 1 tablet by mouth every 7 days 4 tablet 3    pravastatin (PRAVACHOL) 40 MG tablet Take 1 tablet by mouth daily 30 tablet 3    nicotine (NICODERM CQ) 14 MG/24HR Place 1 patch onto the skin every 24 hours 30 patch 1    Blood Pressure Monitor MISC 1 Device by Does not apply route as needed (prn) 1 each 0    Blood Pressure Monitoring (5 SERIES BP MONITOR) RACHELL 1 Device by Does not apply route as needed (PRN) 1 Device 0    aspirin EC 81 MG EC tablet Take 1 tablet by mouth daily 30 tablet 3    doxycycline hyclate (VIBRA-TABS) 100 MG tablet TAKE 1 TABLET BY MOUTH TWICE DAILY 60 tablet 0    cloNIDine (CATAPRES) 0.2 MG tablet Take 0.2 mg by mouth 3 times daily      albuterol sulfate HFA (PROAIR HFA) 108 (90 Base) MCG/ACT inhaler Inhale 2 puffs into the lungs 4 times daily as needed for Wheezing 1 Inhaler 0    calcium acetate (PHOSLO) 667 MG capsule Take by mouth 3 times daily (with meals)      minoxidil (LONITEN) 2.5 MG tablet TAKE 1 TABLET BY MOUTH TWICE DAILY 60 tablet 5    amLODIPine (NORVASC) 10 MG tablet Take 10 mg by mouth daily       No current facility-administered medications for this visit. I will continue her current medication regimen  which is part of the above treatment schedule. It has been helping with Ms. Monreal's chronic  medical problems which for this visit include:   Diagnoses of Chronic pain syndrome, DDD (degenerative disc disease), lumbosacral, Facet arthropathy, lumbosacral ([address] Spondylolisthesis at L4-L5 level, Spinal stenosis of lumbar region, unspecified whether neurogenic claudication present, Bilateral shoulder region arthritis, Osteoarthritis of right knee, unspecified osteoarthritis type, and Morbid obesity ([facility] [facility] were pertinent to this visit. Risks and benefits of the medications and other alternative treatments  including no treatment were discussed with the patient. The common side effects of these medications were also explained to the patient. Informed verbal consent was obtained. Goals of current treatment regimen include improvement in pain, restoration of functioning- with focus on improvement in physical performance, general activity, work or disability,emotional distress, health care utilization and  decreased medication consumption. Will continue to monitor progress towards achieving/maintaining therapeutic goals with special emphasis on  1. Improvement in perceived interfernce  of pain with ADL's. Ability to do home exercises independently. Ability to do household chores indoor and/or outdoor work and social and leisure activities. Improve psychosocial and physical functioning. - she is showing progression towards this treatment goal with the current regimen. She was advised against drinking alcohol with the narcotic pain medicines, advised against driving or handling machinery while adjusting the dose of medicines or if having cognitive  issues related to the current medications. Risk of overdose and death, if medicines not taken as prescribed, were also discussed. If the patient develops new symptoms or if the symptoms worsen, the patient should call the office. While transcribing every attempt was made to maintain the accuracy of the note in terms of it's contents,there may have been some errors made inadvertently. Thank you for allowing me to participate in the care of this patient. Mandi Gonzalez CNP.     Cc: Ulysses Otter, MD

## 2018-08-28 NOTE — PATIENT INSTRUCTIONS
[email or web address] and sign in to your [employer] account. Enter O490 in the WeShop box to learn more about \"Back Stretches: Exercises. \"     If you do not have an account, please click on the \"Sign Up Now\" link. Current as of: November 29, 2017  Content Version: 11.7  © 3572-6132 [employer]. Care instructions adapted under license by [facility] ([facility]). If you have questions about a medical condition or this instruction, always ask your healthcare professional. [address] any warranty or liability for your use of this information. Patient Education        Shoulder Blade: Exercises  Your Care Instructions  Here are some examples of typical exercises for your condition. Start each exercise slowly. Ease off the exercise if you start to have pain. Your doctor or physical therapist will tell you when you can start these exercises and which ones will work best for you. How to do the exercises  Shoulder roll    5. Stand tall with your chin slightly tucked. Imagine that a string at the top of your head is pulling you straight up. 6. Keep your arms relaxed. All motion will be in your shoulders. 7. Shrug your shoulders up toward your ears, then up and back. [place] your shoulders down and back, like you're sliding your hands down into your back pants pockets. 8. Repeat the circles at least 2 to 4 times. 9. This exercise is also helpful anytime you want to relax. Lower neck and upper back stretch    5. With your arms about shoulder height, clasp your hands in front of you. 6. Drop your chin toward your chest.  7. Reach straight forward so you are rounding your upper back. Think about pulling your shoulder blades apart. Maranda Schroeder feel a stretch across your upper back and shoulders. Hold for at least 6 seconds. 8. Repeat 2 to 4 times. Triceps stretch    5. Reach your arm straight up.   6. Keeping your elbow in place, bend your arm and reach your hand problems. It's also a good idea to know your test results and keep a list of the medicines you take. Where can you learn more? Go to [email or web address] and sign in to your [employer] account. Enter [phone] in the [employer] box to learn more about \"Shoulder Blade: Exercises. \"     If you do not have an account, please click on the \"Sign Up Now\" link. Current as of: November 29, 2017  Content Version: 11.7  © 9100-4636 [employer]. Care instructions adapted under license by [facility] ([facility]). If you have questions about a medical condition or this instruction, always ask your healthcare professional. [address] any warranty or liability for your use of this information.

## 2018-09-11 ENCOUNTER — OFFICE VISIT (OUTPATIENT)
Dept: ORTHOPEDIC SURGERY | Age: 73
End: 2018-09-11

## 2018-09-11 VITALS
WEIGHT: 241 LBS | SYSTOLIC BLOOD PRESSURE: 87 MMHG | HEART RATE: 77 BPM | DIASTOLIC BLOOD PRESSURE: 69 MMHG | BODY MASS INDEX: 42.7 KG/M2 | TEMPERATURE: 97.4 F | HEIGHT: 63 IN

## 2018-09-11 DIAGNOSIS — M19.011 BILATERAL SHOULDER REGION ARTHRITIS: Primary | ICD-10-CM

## 2018-09-11 DIAGNOSIS — Z96.659 INFECTED PROSTHETIC KNEE JOINT, SEQUELA: ICD-10-CM

## 2018-09-11 DIAGNOSIS — E78.00 HYPERCHOLESTEROLEMIA: ICD-10-CM

## 2018-09-11 DIAGNOSIS — Z96.659 INFECTION OF PROSTHETIC KNEE JOINT, SUBSEQUENT ENCOUNTER: ICD-10-CM

## 2018-09-11 DIAGNOSIS — T84.59XS INFECTED PROSTHETIC KNEE JOINT, SEQUELA: ICD-10-CM

## 2018-09-11 DIAGNOSIS — I10 ESSENTIAL HYPERTENSION: ICD-10-CM

## 2018-09-11 DIAGNOSIS — I73.9 PERIPHERAL VASCULAR DISEASE (HCC): ICD-10-CM

## 2018-09-11 DIAGNOSIS — M19.012 BILATERAL SHOULDER REGION ARTHRITIS: Primary | ICD-10-CM

## 2018-09-11 DIAGNOSIS — T84.59XD INFECTION OF PROSTHETIC KNEE JOINT, SUBSEQUENT ENCOUNTER: ICD-10-CM

## 2018-09-11 LAB
ALBUMIN SERPL-MCNC: 4 G/DL (ref 3.4–5)
ALP BLD-CCNC: 91 U/L (ref 40–129)
ALT SERPL-CCNC: 6 U/L (ref 10–40)
AST SERPL-CCNC: 11 U/L (ref 15–37)
BILIRUB SERPL-MCNC: 0.3 MG/DL (ref 0–1)
BILIRUBIN DIRECT: <0.2 MG/DL (ref 0–0.3)
BILIRUBIN, INDIRECT: ABNORMAL MG/DL (ref 0–1)
C-REACTIVE PROTEIN: 43 MG/L (ref 0–5.1)
CHOLESTEROL, TOTAL: 137 MG/DL (ref 0–199)
HDLC SERPL-MCNC: 40 MG/DL (ref 40–60)
LDL CHOLESTEROL CALCULATED: 83 MG/DL
SEDIMENTATION RATE, ERYTHROCYTE: 59 MM/HR (ref 0–30)
TOTAL PROTEIN: 7 G/DL (ref 6.4–8.2)
TRIGL SERPL-MCNC: 68 MG/DL (ref 0–150)
VLDLC SERPL CALC-MCNC: 14 MG/DL

## 2018-09-11 PROCEDURE — 20610 DRAIN/INJ JOINT/BURSA W/O US: CPT | Performed by: PHYSICIAN ASSISTANT

## 2018-09-11 PROCEDURE — 99212 OFFICE O/P EST SF 10 MIN: CPT | Performed by: PHYSICIAN ASSISTANT

## 2018-09-11 NOTE — PROGRESS NOTES
performed in the office today:   AP, Y and Axillary views of the left shoulder. There is no evidence of fracture or dislocation. The subacromial space is moderate  narrowed. There is evidence of AC joint arthritis. There is evidence of Gleno-Humeral arthritis. AP, Y and Axillary views of the right shoulder. There is no evidence of fracture or dislocation. The subacromial space is moderate  narrowed. There is evidence of AC joint arthritis. There is evidence of Gleno-Humeral arthritis. Assessment:       ICD-10-CM ICD-9-CM    1. Bilateral shoulder region arthritis M19.011 716.91 MI ARTHROCENTESIS ASPIR&/INJ MAJOR JT/BURSA W/O US    M19.012  MI ARTHROCENTESIS ASPIR&/INJ MAJOR JT/BURSA W/O US      MI TRIAMCINOLONE ACETONIDE INJ      XR SHOULDER LEFT (MIN 2 VIEWS)      XR SHOULDER RIGHT (MIN 2 VIEWS)        Plan:   The natural history of the patient's diagnosis as well as the surgical and non surgical treatment options were discussed in full and questions were answered. Risks and benefits of the treatment options also reviewed in detail. Discussed cortisone injection and it was agreed upon today. Cortisone  Injection  PROCEDURE NOTE:  Pre op Diagnosis: Shoulder pain  Post op Diagnosis: Same  With the patient's permission, her left shoulder was prepped  in standard sterile fashion with  Alcohol and 2 cc of 0.25% Marcaine and 1 cc of Kenalog 40 mg was injected into the left lateral subacromial space  without difficulty. The patient tolerated this well without difficulty. A band-aid was applied. The patient was advised to ice the shoulder for 15-20 minutes to relieve any injection site related pain.      Cortisone  Injection  PROCEDURE NOTE:  Pre op Diagnosis: Shoulder pain  Post op Diagnosis: Same  With the patient's permission, her right shoulder was prepped  in standard sterile fashion with  Alcohol and 2 cc of 0.25% Marcaine and 1 cc of Kenalog 40 mg was injected into the left lateral subacromial space without difficulty. The patient tolerated this well without difficulty. A band-aid was applied. The patient was advised to ice the shoulder for 15-20 minutes to relieve any injection site related pain. Use ice on the affected joint and decrease activity level for 48 hours post injection. If diabetic, monitor blood sugars as there may be a temporary elevation in blood glucose levels related to steroid injection. A home exercise program was instructed today including ROM exercises and strengthening exercises. The patient verbalized understanding of these exercises as well as the importance of the exercise program to promote return of normal function. If pain intensifies or other problems arise you are to notify the office. Follow Up: as needed. Call or return to clinic prn if these symptoms worsen or fail to improve as anticipated.

## 2018-09-17 NOTE — TELEPHONE ENCOUNTER
Sp[facility] to Zulma Montilla at [phone].  Said there is no update as of now  She will call today to check on status

## 2018-09-25 ENCOUNTER — OFFICE VISIT (OUTPATIENT)
Dept: PAIN MANAGEMENT | Age: 73
End: 2018-09-25
Payer: MEDICAID

## 2018-09-25 VITALS
OXYGEN SATURATION: 97 % | BODY MASS INDEX: 44.46 KG/M2 | WEIGHT: 251 LBS | HEART RATE: 78 BPM | SYSTOLIC BLOOD PRESSURE: 92 MMHG | DIASTOLIC BLOOD PRESSURE: 51 MMHG

## 2018-09-25 DIAGNOSIS — G89.4 CHRONIC PAIN SYNDROME: ICD-10-CM

## 2018-09-25 DIAGNOSIS — M19.012 BILATERAL SHOULDER REGION ARTHRITIS: ICD-10-CM

## 2018-09-25 DIAGNOSIS — M43.16 SPONDYLOLISTHESIS AT L4-L5 LEVEL: ICD-10-CM

## 2018-09-25 DIAGNOSIS — M19.011 BILATERAL SHOULDER REGION ARTHRITIS: ICD-10-CM

## 2018-09-25 DIAGNOSIS — M48.061 SPINAL STENOSIS OF LUMBAR REGION, UNSPECIFIED WHETHER NEUROGENIC CLAUDICATION PRESENT: ICD-10-CM

## 2018-09-25 DIAGNOSIS — M17.11 OSTEOARTHRITIS OF RIGHT KNEE, UNSPECIFIED OSTEOARTHRITIS TYPE: ICD-10-CM

## 2018-09-25 DIAGNOSIS — M47.817 FACET ARTHROPATHY, LUMBOSACRAL: ICD-10-CM

## 2018-09-25 DIAGNOSIS — E66.01 MORBID OBESITY (HCC): ICD-10-CM

## 2018-09-25 DIAGNOSIS — M51.37 DDD (DEGENERATIVE DISC DISEASE), LUMBOSACRAL: ICD-10-CM

## 2018-09-25 PROCEDURE — 99213 OFFICE O/P EST LOW 20 MIN: CPT | Performed by: NURSE PRACTITIONER

## 2018-09-25 RX ORDER — HYDROCODONE BITARTRATE AND ACETAMINOPHEN 7.5; 325 MG/1; MG/1
1 TABLET ORAL 2 TIMES DAILY PRN
Qty: 60 TABLET | Refills: 0 | Status: SHIPPED | OUTPATIENT
Start: 2018-09-25 | End: 2018-10-23 | Stop reason: SDUPTHER

## 2018-09-25 NOTE — PROGRESS NOTES
Gastrointestinal: Negative for nausea, vomiting, abdominal pain, diarrhea, constipation, blood in stool and abdominal distention. PHYSICAL EXAM:   Nursing note and vitals reviewed. BP (!) 92/51   Pulse 78   Wt 251 lb (113.9 kg)   LMP 01/21/1964   SpO2 97%   BMI 44.46 kg/m²   Constitutional: She appears well-developed and well-nourished. No acute distress. Skin: Skin is warm and dry, good turgor. No rash noted. She is not diaphoretic. Cardiovascular: Normal rate, regular rhythm, normal heart sounds, and does not have murmur. Pulmonary/Chest: Effort normal. No respiratory distress. She does not have wheezes in the lung fields. She has no rales. Neurological/Psychiatric:She is alert and oriented to person, place, and time. Coordination is  normal. Her mood isAppropriate and affect is Neutral/Euthymic(normal) . IMPRESSION:   1. Chronic pain syndrome    2. DDD (degenerative disc disease), lumbosacral    3. Facet arthropathy, lumbosacral (HCC)    4. Spondylolisthesis at L4-L5 level    5. Spinal stenosis of lumbar region, unspecified whether neurogenic claudication present    6. Bilateral shoulder region arthritis    7. Osteoarthritis of right knee, unspecified osteoarthritis type    8. Morbid obesity ([facility] [address]        PLAN:  Informed verbal consent was obtained  -Continue with Cymbalta, Norco, VTalgel  -Shoulder stretches/Jeimy exercises  -Continue to f/u with orthopedics for OA shoulders  -She was advised weight reduction, diet changes- 800-1200 sierra diet, diet diary, exercising, nutritional  consult increased physical activity as tolerated  -CBT techniques- relaxation therapies such as biofeedback, mindfulness based stress reduction, imagery, cognitive restructuring, problem solving discussed with patient  -Last UDS consistent  -Return in about 4 weeks (around 10/23/2018). -[place] record was obtained and reviewed  for the last one year and no indicators of drug misuse  were found.  Any other

## 2018-09-26 ENCOUNTER — OFFICE VISIT (OUTPATIENT)
Dept: INFECTIOUS DISEASES | Age: 73
End: 2018-09-26
Payer: MEDICAID

## 2018-09-26 VITALS
SYSTOLIC BLOOD PRESSURE: 112 MMHG | DIASTOLIC BLOOD PRESSURE: 66 MMHG | HEART RATE: 72 BPM | TEMPERATURE: 98.1 F | BODY MASS INDEX: 44.46 KG/M2 | HEIGHT: 63 IN | OXYGEN SATURATION: 97 %

## 2018-09-26 DIAGNOSIS — T84.59XD INFECTION OF PROSTHETIC KNEE JOINT, SUBSEQUENT ENCOUNTER: Primary | ICD-10-CM

## 2018-09-26 DIAGNOSIS — Z96.651 STATUS POST TOTAL RIGHT KNEE REPLACEMENT: ICD-10-CM

## 2018-09-26 DIAGNOSIS — N18.6 ESRD (END STAGE RENAL DISEASE) (HCC): ICD-10-CM

## 2018-09-26 DIAGNOSIS — Z96.659 INFECTION OF PROSTHETIC KNEE JOINT, SUBSEQUENT ENCOUNTER: Primary | ICD-10-CM

## 2018-09-26 PROCEDURE — 99213 OFFICE O/P EST LOW 20 MIN: CPT | Performed by: INTERNAL MEDICINE

## 2018-09-26 RX ORDER — FLUCONAZOLE 100 MG/1
100 TABLET ORAL DAILY
Qty: 3 TABLET | Refills: 1 | Status: SHIPPED | OUTPATIENT
Start: 2018-09-26 | End: 2018-09-29

## 2018-09-27 ENCOUNTER — TELEPHONE (OUTPATIENT)
Dept: PULMONOLOGY | Age: 73
End: 2018-09-27

## 2018-09-30 NOTE — PROGRESS NOTES
Infectious Diseases Outpatient Follow-up Note      Primary Care Physician:  Dwight Menendez MD       History Obtained From:   Patient, EPIC    CHIEF COMPLAINT / ID problem:    Chief Complaint   Patient presents with    Follow-up     Infection of prosthetic knee joint, subsequent encounter       HISTORY OF PRESENT ILLNESS / Interval history:    Follow up on Rt TKA infection on oral abx suppression on Doxycycline and doing better and no flare ups her mobility is limited by OA of both knee and hips she uses scooter for ambulation and ESRD on HD . She has a Question about Lumbar epidural for pain control and she should be ok for this as there is no active infectious process in the Rt knee.     Past Medical History:    Past Medical History:   Diagnosis Date    Acid reflux 11/18/2016    CAFL (chronic airflow limitation) ([facility] [address] 11/18/2016    Depression     ESRD on dialysis ([facility] [address] 09/08/2011    Femoral artery stenosis, right ([facility] [address] 01/2016    severe, per angio (>90%)    Hyperlipidemia     Hypertension     Infected prosthetic knee joint ([facility] [address]     Right    Morbid obesity ([facility] [address] 8/29/2008    MRSA nasal colonization 1/12/16    Peripheral vascular disease ([facility] [address]     Right knee DJD 9/8/2011    S/P TKR (total knee replacement) 01/2016    bilateral       Past Surgical History:    Past Surgical History:   Procedure Laterality Date    CARDIAC CATHETERIZATION  04/28/2015    Dr. Evonne Castro Bilateral     DIALYSIS FISTULA CREATION Left     HYSTERECTOMY      KNEE ARTHROPLASTY Right 01/20/2016    Dr. Fabby Mckeon - resection arthroplasty and placement of antibiotic spacer     LYMPH NODE BIOPSY      OTHER SURGICAL HISTORY Right 01/12/2016    Dr. Abiel Virgen - RLE angiogram prior to revision knee arthroplasty    REVISION TOTAL KNEE ARTHROPLASTY Right 06/20/2016    Dr. Fabby Mckeon - I&D w/repeat Prostalac spacer implant     REVISION TOTAL KNEE ARTHROPLASTY Right 11/15/2016    Dr. Fabby Mckeon - staged revision due

## 2018-10-02 ENCOUNTER — OFFICE VISIT (OUTPATIENT)
Dept: INTERNAL MEDICINE CLINIC | Age: 73
End: 2018-10-02
Payer: MEDICAID

## 2018-10-02 VITALS
TEMPERATURE: 98.1 F | DIASTOLIC BLOOD PRESSURE: 80 MMHG | HEIGHT: 63 IN | BODY MASS INDEX: 44.3 KG/M2 | HEART RATE: 79 BPM | WEIGHT: 250 LBS | OXYGEN SATURATION: 97 % | SYSTOLIC BLOOD PRESSURE: 118 MMHG

## 2018-10-02 DIAGNOSIS — E78.5 HYPERLIPIDEMIA LDL GOAL <100: ICD-10-CM

## 2018-10-02 DIAGNOSIS — E55.9 VITAMIN D DEFICIENCY: ICD-10-CM

## 2018-10-02 DIAGNOSIS — M81.8 OTHER OSTEOPOROSIS WITHOUT CURRENT PATHOLOGICAL FRACTURE: ICD-10-CM

## 2018-10-02 DIAGNOSIS — Z02.9 ENCOUNTERS FOR ADMINISTRATIVE PURPOSE: ICD-10-CM

## 2018-10-02 DIAGNOSIS — R91.1 PULMONARY NODULE: ICD-10-CM

## 2018-10-02 DIAGNOSIS — R05.9 COUGH: ICD-10-CM

## 2018-10-02 DIAGNOSIS — N18.6 ESRD (END STAGE RENAL DISEASE) (HCC): ICD-10-CM

## 2018-10-02 DIAGNOSIS — I10 ESSENTIAL HYPERTENSION: Primary | ICD-10-CM

## 2018-10-02 DIAGNOSIS — J32.9 OTHER SINUSITIS, UNSPECIFIED CHRONICITY: ICD-10-CM

## 2018-10-02 PROCEDURE — 99214 OFFICE O/P EST MOD 30 MIN: CPT | Performed by: INTERNAL MEDICINE

## 2018-10-02 RX ORDER — ALENDRONATE SODIUM 70 MG/1
70 TABLET ORAL
Qty: 4 TABLET | Refills: 3 | Status: SHIPPED | OUTPATIENT
Start: 2018-10-02 | End: 2019-02-05 | Stop reason: SDUPTHER

## 2018-10-02 RX ORDER — LORATADINE 10 MG/1
10 TABLET ORAL DAILY PRN
Qty: 30 TABLET | Refills: 3 | Status: SHIPPED | OUTPATIENT
Start: 2018-10-02 | End: 2019-02-05 | Stop reason: ALTCHOICE

## 2018-10-02 RX ORDER — PRAVASTATIN SODIUM 40 MG
40 TABLET ORAL DAILY
Qty: 30 TABLET | Refills: 3 | Status: SHIPPED | OUTPATIENT
Start: 2018-10-02 | End: 2019-02-05 | Stop reason: SDUPTHER

## 2018-10-02 RX ORDER — BENZONATATE 100 MG/1
100 CAPSULE ORAL 2 TIMES DAILY PRN
Qty: 20 CAPSULE | Refills: 0 | Status: SHIPPED | OUTPATIENT
Start: 2018-10-02 | End: 2019-02-05 | Stop reason: ALTCHOICE

## 2018-10-09 ENCOUNTER — HOSPITAL ENCOUNTER (OUTPATIENT)
Dept: CT IMAGING | Age: 73
Discharge: HOME | End: 2018-10-09
Payer: MEDICAID

## 2018-10-09 ENCOUNTER — TELEPHONE (OUTPATIENT)
Dept: INTERNAL MEDICINE CLINIC | Age: 73
End: 2018-10-09

## 2018-10-09 DIAGNOSIS — R91.1 PULMONARY NODULE: ICD-10-CM

## 2018-10-09 PROCEDURE — 71250 CT THORAX DX C-: CPT

## 2018-10-16 NOTE — TELEPHONE ENCOUNTER
Spoke to Lianet and she said this is still pending. They are requesting signed CMN for pap device.  She will fax over for Dr Margaret Quiñones to sign and return

## 2018-10-18 ENCOUNTER — TELEPHONE (OUTPATIENT)
Dept: PULMONOLOGY | Age: 73
End: 2018-10-18

## 2018-10-22 ENCOUNTER — OFFICE VISIT (OUTPATIENT)
Dept: INTERNAL MEDICINE CLINIC | Age: 73
End: 2018-10-22
Payer: MEDICAID

## 2018-10-22 VITALS
WEIGHT: 251 LBS | TEMPERATURE: 99 F | SYSTOLIC BLOOD PRESSURE: 128 MMHG | DIASTOLIC BLOOD PRESSURE: 80 MMHG | HEART RATE: 78 BPM | HEIGHT: 63 IN | OXYGEN SATURATION: 92 % | BODY MASS INDEX: 44.47 KG/M2

## 2018-10-22 DIAGNOSIS — J32.9 OTHER SINUSITIS, UNSPECIFIED CHRONICITY: ICD-10-CM

## 2018-10-22 DIAGNOSIS — N18.6 ESRD (END STAGE RENAL DISEASE) (HCC): ICD-10-CM

## 2018-10-22 DIAGNOSIS — E55.9 VITAMIN D DEFICIENCY: ICD-10-CM

## 2018-10-22 DIAGNOSIS — M81.8 OTHER OSTEOPOROSIS WITHOUT CURRENT PATHOLOGICAL FRACTURE: ICD-10-CM

## 2018-10-22 DIAGNOSIS — E78.5 HYPERLIPIDEMIA LDL GOAL <100: ICD-10-CM

## 2018-10-22 DIAGNOSIS — R05.3 PERSISTENT COUGH: ICD-10-CM

## 2018-10-22 DIAGNOSIS — I10 ESSENTIAL HYPERTENSION: ICD-10-CM

## 2018-10-22 DIAGNOSIS — R91.1 PULMONARY NODULE: ICD-10-CM

## 2018-10-22 DIAGNOSIS — R59.0 AXILLARY LYMPHADENOPATHY: Primary | ICD-10-CM

## 2018-10-22 PROCEDURE — 99214 OFFICE O/P EST MOD 30 MIN: CPT | Performed by: INTERNAL MEDICINE

## 2018-10-22 RX ORDER — AZITHROMYCIN 250 MG/1
TABLET, FILM COATED ORAL
Qty: 1 PACKET | Refills: 0 | Status: SHIPPED | OUTPATIENT
Start: 2018-10-22 | End: 2018-10-26

## 2018-10-22 RX ORDER — FLUTICASONE PROPIONATE 50 MCG
2 SPRAY, SUSPENSION (ML) NASAL DAILY PRN
Qty: 1 BOTTLE | Refills: 1 | Status: SHIPPED | OUTPATIENT
Start: 2018-10-22 | End: 2019-10-02 | Stop reason: SDUPTHER

## 2018-10-22 NOTE — PROGRESS NOTES
TORSTEN Boyd CNP   diclofenac sodium (VOLTAREN) 1 % GEL Apply 2-4 g topically 2 times daily Yes TORSTEN Pike CNP   nicotine (NICODERM CQ) 14 MG/24HR Place 1 patch onto the skin every 24 hours Yes Tobias Gottlieb MD   Blood Pressure Monitor MISC 1 Device by Does not apply route as needed (prn) Yes Tobias Gottlieb MD   Blood Pressure Monitoring (5 SERIES BP MONITOR) RACHELL 1 Device by Does not apply route as needed (PRN) Yes Tobias Gottlieb MD   aspirin EC 81 MG EC tablet Take 1 tablet by mouth daily Yes TORSTEN Tanner CNP   doxycycline hyclate (VIBRA-TABS) 100 MG tablet TAKE 1 TABLET BY MOUTH TWICE DAILY Yes Melissa Kong MD   cloNIDine (CATAPRES) 0.2 MG tablet Take 0.2 mg by mouth 3 times daily Yes Historical Provider, MD   albuterol sulfate HFA (PROAIR HFA) 108 (90 Base) MCG/ACT inhaler Inhale 2 puffs into the lungs 4 times daily as needed for Wheezing Yes Tobias Gottlieb MD   calcium acetate (PHOSLO) 667 MG capsule Take by mouth 3 times daily (with meals) Yes Historical Provider, MD   minoxidil (LONITEN) 2.5 MG tablet TAKE 1 TABLET BY MOUTH TWICE DAILY Yes Merrill Villalta MD   amLODIPine (NORVASC) 10 MG tablet Take 10 mg by mouth daily Yes Historical Provider, MD       ROS: COMPREHENSIVE ROS AS IN HX, REST -VE  History obtained from chart review and the patient    OBJECTIVE:   NURSING NOTE AND VITALS REVIEWED  /62 (Site: Right Upper Arm, Position: Sitting, Cuff Size: Large Adult)   Pulse 96   Temp 99 °F (37.2 °C)   Ht 5' 3\" (1.6 m)   Wt 251 lb (113.9 kg) Comment: at dialysis per pt  LMP 01/21/1964   SpO2 92%   Breastfeeding? No   BMI 44.46 kg/m²     NO ACUTE DISTRESS    REPEAT BP:  128/80 (RT)    REPEAT PULSE:  78 - MANUAL    Body mass index is 44.46 kg/m².      HEENT: NO PALLOR, ANICTERIC, PERRLA, EOMI, NO CONJUNCTIVAL ERYTHEMA,                 + NASAL CONGESTION, NO SINUS TENDERNESS  NECK:  SUPPLE, TRACHEA MIDLINE, NT, NO JVD, NO CB, NO LA, NO TM, NO

## 2018-10-22 NOTE — PATIENT INSTRUCTIONS
TAKE MED AS ADVISED    DIET/ EXERCISE.     FOLLOW UP PREVIOUS / AS NEEDED    FOLLOW UP FOR MAMMOGRAM, WITH BREAST SURGERY

## 2018-10-23 ENCOUNTER — OFFICE VISIT (OUTPATIENT)
Dept: PAIN MANAGEMENT | Age: 73
End: 2018-10-23
Payer: MEDICAID

## 2018-10-23 VITALS — DIASTOLIC BLOOD PRESSURE: 70 MMHG | HEART RATE: 68 BPM | OXYGEN SATURATION: 97 % | SYSTOLIC BLOOD PRESSURE: 114 MMHG

## 2018-10-23 DIAGNOSIS — M51.37 DDD (DEGENERATIVE DISC DISEASE), LUMBOSACRAL: ICD-10-CM

## 2018-10-23 DIAGNOSIS — E66.01 MORBID OBESITY (HCC): ICD-10-CM

## 2018-10-23 DIAGNOSIS — M43.16 SPONDYLOLISTHESIS AT L4-L5 LEVEL: ICD-10-CM

## 2018-10-23 DIAGNOSIS — M47.817 FACET ARTHROPATHY, LUMBOSACRAL: ICD-10-CM

## 2018-10-23 DIAGNOSIS — M17.11 OSTEOARTHRITIS OF RIGHT KNEE, UNSPECIFIED OSTEOARTHRITIS TYPE: ICD-10-CM

## 2018-10-23 DIAGNOSIS — M19.012 BILATERAL SHOULDER REGION ARTHRITIS: ICD-10-CM

## 2018-10-23 DIAGNOSIS — M19.011 BILATERAL SHOULDER REGION ARTHRITIS: ICD-10-CM

## 2018-10-23 DIAGNOSIS — G89.4 CHRONIC PAIN SYNDROME: ICD-10-CM

## 2018-10-23 DIAGNOSIS — M48.061 SPINAL STENOSIS OF LUMBAR REGION, UNSPECIFIED WHETHER NEUROGENIC CLAUDICATION PRESENT: ICD-10-CM

## 2018-10-23 PROCEDURE — 99213 OFFICE O/P EST LOW 20 MIN: CPT | Performed by: NURSE PRACTITIONER

## 2018-10-23 RX ORDER — HYDROCODONE BITARTRATE AND ACETAMINOPHEN 7.5; 325 MG/1; MG/1
1 TABLET ORAL 2 TIMES DAILY PRN
Qty: 60 TABLET | Refills: 0 | Status: SHIPPED | OUTPATIENT
Start: 2018-10-23 | End: 2018-11-20 | Stop reason: SDUPTHER

## 2018-10-23 RX ORDER — DULOXETIN HYDROCHLORIDE 30 MG/1
30 CAPSULE, DELAYED RELEASE ORAL DAILY
Qty: 30 CAPSULE | Refills: 1 | Status: SHIPPED | OUTPATIENT
Start: 2018-10-23 | End: 2018-12-18 | Stop reason: SDUPTHER

## 2018-10-23 NOTE — PROGRESS NOTES
mouth daily      HYDROcodone-acetaminophen (NORCO) 7.5-325 MG per tablet Take 1 tablet by mouth 2 times daily as needed for Pain for up to 30 days. . 60 tablet 0    DULoxetine (CYMBALTA) 30 MG extended release capsule Take 1 capsule by mouth daily 30 capsule 1     No facility-administered medications prior to visit. SOCIAL/FAMILY/PAST MEDICAL HISTORY: Ms. Taurus Edwards, family and past medical history was reviewed. REVIEW OF SYSTEMS:    Respiratory: Negative for apnea, chest tightness and shortness of breath or change in baseline breathing. Gastrointestinal: Negative for nausea, vomiting, abdominal pain, diarrhea, constipation, blood in stool and abdominal distention. PHYSICAL EXAM:   Nursing note and vitals reviewed. /70   Pulse 68   LMP 01/21/1964   SpO2 97%   Constitutional: She appears well-developed and well-nourished. No acute distress. Skin: Skin is warm and dry, good turgor. No rash noted. She is not diaphoretic. Cardiovascular: Normal rate, regular rhythm, normal heart sounds, and does not have murmur. Pulmonary/Chest: Effort normal. No respiratory distress. She does not have wheezes in the lung fields. She has no rales. Neurological/Psychiatric:She is alert and oriented to person, place, and time. Coordination is  Normal. Tenderness to the lumbar paraspinal muscles with palpation/flexion 20 degrees. Negative for obvious deformity. Her mood isAppropriate and affect is Neutral/Euthymic(normal) . MRI of the Lumbar spine 11/9/17:  There is severe spinal canal stenosis at the L3-4 and L4-5 levels due to a   combination of facet hypertrophy and disc bulging.       Mild listhesis at L4-5 and L5-S1 likely degenerative.       Mild- moderate multilevel neural foraminal narrowing. IMPRESSION:   1. Chronic pain syndrome    2. DDD (degenerative disc disease), lumbosacral    3. Facet arthropathy, lumbosacral    4. Spondylolisthesis at L4-L5 level    5.  Spinal stenosis of lumbar region, unspecified whether neurogenic claudication present    6. Bilateral shoulder region arthritis    7. Osteoarthritis of right knee, unspecified osteoarthritis type    8. Morbid obesity ([address]        PLAN:  Informed verbal consent was obtained  -Continue with Cymbalta, Norco, v.gel  Jeimy stretches  -Educations provided on TPI of the Lumbar spine, side effects reviewed including a temporary elevation in blood sugars, advised to monitor closely. She verbalized understanding, and voiced interest  -Informed patient that medical marijuana is not prescribed at this clinic, patient will be informed of any changes once it is FDA approved. She verbalized understanding  -CBT techniques- relaxation therapies such as biofeedback, mindfulness based stress reduction, imagery, cognitive restructuring, problem solving discussed with patient  -Last UDS consistent  -Return in about 4 weeks (around 11/20/2018). -Patient left before injection was administered due to transportation issues    Current Outpatient Prescriptions   Medication Sig Dispense Refill    DULoxetine (CYMBALTA) 30 MG extended release capsule Take 1 capsule by mouth daily 30 capsule 1    HYDROcodone-acetaminophen (NORCO) 7.5-325 MG per tablet Take 1 tablet by mouth 2 times daily as needed for Pain for up to 30 days. . 60 tablet 0    azithromycin (ZITHROMAX Z-PATRICE) 250 MG tablet Take 2 tablets (500 mg) on Day 1, and then take 1 tablet (250 mg) on days 2 through 5. 1 packet 0    fluticasone (FLONASE) 50 MCG/ACT nasal spray 2 sprays by Nasal route daily as needed for Rhinitis 1 Bottle 1    Cholecalciferol (VITAMIN D3) 1000 units CAPS Take 1 capsule by mouth daily 30 capsule 2    alendronate (FOSAMAX) 70 MG tablet Take 1 tablet by mouth every 7 days 4 tablet 3    pravastatin (PRAVACHOL) 40 MG tablet Take 1 tablet by mouth daily 30 tablet 3    benzonatate (TESSALON PERLES) 100 MG capsule Take 1 capsule by mouth 2 times daily as needed for Cough 20 capsule 0    loratadine (CLARITIN) 10 MG tablet Take 1 tablet by mouth daily as needed (PRN) 30 tablet 3    diclofenac sodium (VOLTAREN) 1 % GEL Apply 2-4 g topically 2 times daily 5 Tube 0    nicotine (NICODERM CQ) 14 MG/24HR Place 1 patch onto the skin every 24 hours 30 patch 1    Blood Pressure Monitor MISC 1 Device by Does not apply route as needed (prn) 1 each 0    Blood Pressure Monitoring (5 SERIES BP MONITOR) RACHELL 1 Device by Does not apply route as needed (PRN) 1 Device 0    aspirin EC 81 MG EC tablet Take 1 tablet by mouth daily 30 tablet 3    doxycycline hyclate (VIBRA-TABS) 100 MG tablet TAKE 1 TABLET BY MOUTH TWICE DAILY 60 tablet 0    cloNIDine (CATAPRES) 0.2 MG tablet Take 0.2 mg by mouth 3 times daily      albuterol sulfate HFA (PROAIR HFA) 108 (90 Base) MCG/ACT inhaler Inhale 2 puffs into the lungs 4 times daily as needed for Wheezing 1 Inhaler 0    calcium acetate (PHOSLO) 667 MG capsule Take by mouth 3 times daily (with meals)      minoxidil (LONITEN) 2.5 MG tablet TAKE 1 TABLET BY MOUTH TWICE DAILY 60 tablet 5    amLODIPine (NORVASC) 10 MG tablet Take 10 mg by mouth daily       No current facility-administered medications for this visit. I will continue her current medication regimen  which is part of the above treatment schedule. It has been helping with Ms. Monreal's chronic  medical problems which for this visit include:   Diagnoses of Chronic pain syndrome, DDD (degenerative disc disease), lumbosacral, Facet arthropathy, lumbosacral, Spondylolisthesis at L4-L5 level, Spinal stenosis of lumbar region, unspecified whether neurogenic claudication present, Bilateral shoulder region arthritis, Osteoarthritis of right knee, unspecified osteoarthritis type, and Morbid obesity ([address] were pertinent to this visit. Risks and benefits of the medications and other alternative treatments  including no treatment were discussed with the patient. The common side effects of these

## 2018-10-25 ENCOUNTER — OFFICE VISIT (OUTPATIENT)
Dept: CARDIOLOGY CLINIC | Age: 73
End: 2018-10-25
Payer: MEDICAID

## 2018-10-25 VITALS
OXYGEN SATURATION: 92 % | SYSTOLIC BLOOD PRESSURE: 110 MMHG | WEIGHT: 249.6 LBS | BODY MASS INDEX: 44.21 KG/M2 | DIASTOLIC BLOOD PRESSURE: 54 MMHG | HEART RATE: 80 BPM

## 2018-10-25 DIAGNOSIS — I10 ESSENTIAL HYPERTENSION: Primary | ICD-10-CM

## 2018-10-25 PROCEDURE — 93000 ELECTROCARDIOGRAM COMPLETE: CPT | Performed by: INTERNAL MEDICINE

## 2018-10-25 PROCEDURE — 99214 OFFICE O/P EST MOD 30 MIN: CPT | Performed by: INTERNAL MEDICINE

## 2018-10-25 RX ORDER — CLOPIDOGREL BISULFATE 75 MG/1
75 TABLET ORAL DAILY
Qty: 30 TABLET | Refills: 3 | Status: SHIPPED | OUTPATIENT
Start: 2018-10-25 | End: 2019-02-05 | Stop reason: SDUPTHER

## 2018-10-25 NOTE — PROGRESS NOTES
KNEE ARTHROPLASTY Right 11/15/2016    Dr. Card Roles - staged revision due to infection    TOTAL KNEE ARTHROPLASTY Right 2007    Dr. Lani Gandhi Left 2008    Dr. Shagufta June     SH:       History   Smoking Status    Former Smoker    Packs/day: 0.00    Years: 54.00    Types: Cigarettes    Start date: 1/1/1962   [facility] Quit date: 3/20/2018   Smokeless Tobacco    Never Used     Current Medications:  Prior to Visit Medications    Medication Sig Taking? Authorizing Provider   HYDROcodone-acetaminophen (NORCO) 7.5-325 MG per tablet Take 1 tablet by mouth 2 times daily as needed for Pain for up to 30 days. TORSTEN Law - CNP   azithromycin (ZITHROMAX Z-PATRICE) 250 MG tablet Take 2 tablets (500 mg) on Day 1, and then take 1 tablet (250 mg) on days 2 through 5.  Yes Onel Wood MD   Cholecalciferol (VITAMIN D3) 1000 units CAPS Take 1 capsule by mouth daily Yes Onel Wood MD   alendronate (FOSAMAX) 70 MG tablet Take 1 tablet by mouth every 7 days Yes Onel Wood MD   pravastatin (PRAVACHOL) 40 MG tablet Take 1 tablet by mouth daily Yes Onel Wood MD   loratadine (CLARITIN) 10 MG tablet Take 1 tablet by mouth daily as needed (PRN) Yes Onel Wood MD   Blood Pressure Monitor MISC 1 Device by Does not apply route as needed (prn) Yes Onel Wood MD   Blood Pressure Monitoring (5 SERIES BP MONITOR) RACHELL 1 Device by Does not apply route as needed (PRN) Yes Onel Wood MD   aspirin EC 81 MG EC tablet Take 1 tablet by mouth daily Yes TORSTEN Walker - CNP   doxycycline hyclate (VIBRA-TABS) 100 MG tablet TAKE 1 TABLET BY MOUTH TWICE DAILY Yes Usman Smith MD   cloNIDine (CATAPRES) 0.2 MG tablet Take 0.2 mg by mouth 3 times daily Yes Historical Provider, MD   albuterol sulfate HFA (PROAIR HFA) 108 (90 Base) MCG/ACT inhaler Inhale 2 puffs into the lungs 4 times daily as needed for Wheezing Yes Onel Wood MD   calcium acetate (PHOSLO) 667 MG capsule Take by mouth 3

## 2018-10-30 RX ORDER — CLOPIDOGREL BISULFATE 75 MG/1
75 TABLET ORAL DAILY
Qty: 30 TABLET | Refills: 3 | Status: SHIPPED | OUTPATIENT
Start: 2018-10-30

## 2018-11-01 PROBLEM — J32.9 SINUSITIS: Status: RESOLVED | Noted: 2018-10-02 | Resolved: 2018-11-01

## 2018-11-20 ENCOUNTER — TELEPHONE (OUTPATIENT)
Dept: ORTHOPEDIC SURGERY | Age: 73
End: 2018-11-20

## 2018-11-20 ENCOUNTER — OFFICE VISIT (OUTPATIENT)
Dept: PAIN MANAGEMENT | Age: 73
End: 2018-11-20
Payer: MEDICAID

## 2018-11-20 VITALS — SYSTOLIC BLOOD PRESSURE: 111 MMHG | DIASTOLIC BLOOD PRESSURE: 55 MMHG | OXYGEN SATURATION: 97 % | HEART RATE: 81 BPM

## 2018-11-20 DIAGNOSIS — M17.11 OSTEOARTHRITIS OF RIGHT KNEE, UNSPECIFIED OSTEOARTHRITIS TYPE: ICD-10-CM

## 2018-11-20 DIAGNOSIS — M19.012 BILATERAL SHOULDER REGION ARTHRITIS: ICD-10-CM

## 2018-11-20 DIAGNOSIS — M51.37 DDD (DEGENERATIVE DISC DISEASE), LUMBOSACRAL: ICD-10-CM

## 2018-11-20 DIAGNOSIS — M47.817 FACET ARTHROPATHY, LUMBOSACRAL: ICD-10-CM

## 2018-11-20 DIAGNOSIS — G89.4 CHRONIC PAIN SYNDROME: Primary | ICD-10-CM

## 2018-11-20 DIAGNOSIS — M43.16 SPONDYLOLISTHESIS AT L4-L5 LEVEL: ICD-10-CM

## 2018-11-20 DIAGNOSIS — F51.01 PRIMARY INSOMNIA: ICD-10-CM

## 2018-11-20 DIAGNOSIS — M19.011 BILATERAL SHOULDER REGION ARTHRITIS: ICD-10-CM

## 2018-11-20 DIAGNOSIS — M48.061 SPINAL STENOSIS OF LUMBAR REGION, UNSPECIFIED WHETHER NEUROGENIC CLAUDICATION PRESENT: ICD-10-CM

## 2018-11-20 DIAGNOSIS — E66.01 MORBID OBESITY (HCC): ICD-10-CM

## 2018-11-20 PROCEDURE — 99213 OFFICE O/P EST LOW 20 MIN: CPT | Performed by: NURSE PRACTITIONER

## 2018-11-20 RX ORDER — NORTRIPTYLINE HYDROCHLORIDE 10 MG/1
10 CAPSULE ORAL NIGHTLY
Qty: 30 CAPSULE | Refills: 3 | Status: SHIPPED | OUTPATIENT
Start: 2018-11-20 | End: 2018-12-18 | Stop reason: SDUPTHER

## 2018-11-20 RX ORDER — HYDROCODONE BITARTRATE AND ACETAMINOPHEN 7.5; 325 MG/1; MG/1
1 TABLET ORAL 2 TIMES DAILY PRN
Qty: 60 TABLET | Refills: 0 | Status: SHIPPED | OUTPATIENT
Start: 2018-11-20 | End: 2018-12-18 | Stop reason: SDUPTHER

## 2018-11-20 NOTE — PROGRESS NOTES
MEDICATIONS: Ms. Rylee Caputo list of medications were reviewed. Her current medications are   Outpatient Medications Prior to Visit   Medication Sig Dispense Refill    clopidogrel (PLAVIX) 75 MG tablet Take 1 tablet by mouth daily 30 tablet 3    clopidogrel (PLAVIX) 75 MG tablet Take 1 tablet by mouth daily 30 tablet 3    DULoxetine (CYMBALTA) 30 MG extended release capsule Take 1 capsule by mouth daily 30 capsule 1    fluticasone (FLONASE) 50 MCG/ACT nasal spray 2 sprays by Nasal route daily as needed for Rhinitis 1 Bottle 1    Cholecalciferol (VITAMIN D3) 1000 units CAPS Take 1 capsule by mouth daily 30 capsule 2    alendronate (FOSAMAX) 70 MG tablet Take 1 tablet by mouth every 7 days 4 tablet 3    pravastatin (PRAVACHOL) 40 MG tablet Take 1 tablet by mouth daily 30 tablet 3    benzonatate (TESSALON PERLES) 100 MG capsule Take 1 capsule by mouth 2 times daily as needed for Cough 20 capsule 0    diclofenac sodium (VOLTAREN) 1 % GEL Apply 2-4 g topically 2 times daily 5 Tube 0    nicotine (NICODERM CQ) 14 MG/24HR Place 1 patch onto the skin every 24 hours 30 patch 1    Blood Pressure Monitor MISC 1 Device by Does not apply route as needed (prn) 1 each 0    Blood Pressure Monitoring (5 SERIES BP MONITOR) RACHELL 1 Device by Does not apply route as needed (PRN) 1 Device 0    aspirin EC 81 MG EC tablet Take 1 tablet by mouth daily 30 tablet 3    doxycycline hyclate (VIBRA-TABS) 100 MG tablet TAKE 1 TABLET BY MOUTH TWICE DAILY 60 tablet 0    cloNIDine (CATAPRES) 0.2 MG tablet Take 0.2 mg by mouth 3 times daily      albuterol sulfate HFA (PROAIR HFA) 108 (90 Base) MCG/ACT inhaler Inhale 2 puffs into the lungs 4 times daily as needed for Wheezing 1 Inhaler 0    calcium acetate (PHOSLO) 667 MG capsule Take by mouth 3 times daily (with meals)      minoxidil (LONITEN) 2.5 MG tablet TAKE 1 TABLET BY MOUTH TWICE DAILY 60 tablet 5    amLODIPine (NORVASC) 10 MG tablet Take 10 mg by mouth daily     

## 2018-11-20 NOTE — TELEPHONE ENCOUNTER
Patient call today requesting to schedule ILIANA. Patient last seen 4/26/18 and to be scheduled for L5/S1 IL. We were awaiting approval for Dr. Woody Devine for infection prior to scheduling. Patient seen by Dr. Woody Devine on 9/26/2018 and cleared to proceed with ILIANA. Dr. Alex Bills has reviewed patient's chart and stated patient is okay to schedule ILIANA without follow up visit prior to injection. Attempted to call patient today to schedule L5/S1 IL.  L/m for patient to call back to schedule.

## 2018-12-13 ENCOUNTER — OFFICE VISIT (OUTPATIENT)
Dept: ORTHOPEDIC SURGERY | Age: 73
End: 2018-12-13
Payer: MEDICAID

## 2018-12-13 VITALS — BODY MASS INDEX: 44.22 KG/M2 | WEIGHT: 249.56 LBS | HEIGHT: 63 IN

## 2018-12-13 DIAGNOSIS — M51.36 DDD (DEGENERATIVE DISC DISEASE), LUMBAR: ICD-10-CM

## 2018-12-13 DIAGNOSIS — M48.062 LUMBAR STENOSIS WITH NEUROGENIC CLAUDICATION: ICD-10-CM

## 2018-12-13 DIAGNOSIS — M43.16 SPONDYLOLISTHESIS, LUMBAR REGION: Primary | ICD-10-CM

## 2018-12-13 PROCEDURE — 99214 OFFICE O/P EST MOD 30 MIN: CPT | Performed by: PHYSICAL MEDICINE & REHABILITATION

## 2018-12-13 NOTE — PROGRESS NOTES
11/18/2016    CAFL (chronic airflow limitation) ([facility]) 11/18/2016    Depression     ESRD on dialysis ([facility] [address] 09/08/2011    Femoral artery stenosis, right ([facility] [address] 01/2016    severe, per angio (>90%)    Hyperlipidemia     Hypertension     Infected prosthetic knee joint ([facility] [address]     Right    Morbid obesity ([facility] [address] 8/29/2008    MRSA nasal colonization 1/12/16    [facility]     Right knee DJD 9/8/2011    S/P TKR (total knee replacement) 01/2016    bilateral      Past Surgical History:     Past Surgical History:   Procedure Laterality Date    CARDIAC CATHETERIZATION  04/28/2015    Dr. Rolo Trotter Bilateral     DIALYSIS FISTULA CREATION Left     HYSTERECTOMY      KNEE ARTHROPLASTY Right 01/20/2016    Dr. Milly Umana - resection arthroplasty and placement of antibiotic spacer     LYMPH NODE BIOPSY      OTHER SURGICAL HISTORY Right 01/12/2016    Dr. John Dumont - RLE angiogram prior to revision knee arthroplasty    REVISION TOTAL KNEE ARTHROPLASTY Right 06/20/2016    Dr. Milly Umana - I&D w/repeat Prostalac spacer implant     REVISION TOTAL KNEE ARTHROPLASTY Right 11/15/2016    Dr. Milly Umana - staged revision due to infection    TOTAL KNEE ARTHROPLASTY Right 2007    Dr. Cricket Cordero Left 2008    Dr. Dwayne Millan     Current Medications:     Current Outpatient Prescriptions:     HYDROcodone-acetaminophen (NORCO) 7.5-325 MG per tablet, Take 1 tablet by mouth 2 times daily as needed for Pain for up to 30 days. ., Disp: 60 tablet, Rfl: 0    nortriptyline (PAMELOR) 10 MG capsule, Take 1 capsule by mouth nightly, Disp: 30 capsule, Rfl: 3    clopidogrel (PLAVIX) 75 MG tablet, Take 1 tablet by mouth daily, Disp: 30 tablet, Rfl: 3    clopidogrel (PLAVIX) 75 MG tablet, Take 1 tablet by mouth daily, Disp: 30 tablet, Rfl: 3    DULoxetine (CYMBALTA) 30 MG extended release capsule, Take 1 capsule by mouth daily, Disp: 30 capsule, Rfl: 1    fluticasone (FLONASE) 50 MCG/ACT nasal

## 2018-12-17 RX ORDER — ASPIRIN 81 MG/1
81 TABLET, COATED ORAL DAILY
Qty: 30 TABLET | Refills: 5 | Status: SHIPPED | OUTPATIENT
Start: 2018-12-17 | End: 2019-07-23 | Stop reason: SDUPTHER

## 2018-12-18 ENCOUNTER — TELEPHONE (OUTPATIENT)
Dept: ORTHOPEDIC SURGERY | Age: 73
End: 2018-12-18

## 2018-12-18 ENCOUNTER — OFFICE VISIT (OUTPATIENT)
Dept: PAIN MANAGEMENT | Age: 73
End: 2018-12-18
Payer: MEDICAID

## 2018-12-18 VITALS — DIASTOLIC BLOOD PRESSURE: 51 MMHG | HEART RATE: 98 BPM | SYSTOLIC BLOOD PRESSURE: 95 MMHG

## 2018-12-18 DIAGNOSIS — F51.01 PRIMARY INSOMNIA: ICD-10-CM

## 2018-12-18 DIAGNOSIS — M43.16 SPONDYLOLISTHESIS AT L4-L5 LEVEL: ICD-10-CM

## 2018-12-18 DIAGNOSIS — M19.012 BILATERAL SHOULDER REGION ARTHRITIS: ICD-10-CM

## 2018-12-18 DIAGNOSIS — M51.37 DDD (DEGENERATIVE DISC DISEASE), LUMBOSACRAL: ICD-10-CM

## 2018-12-18 DIAGNOSIS — M17.11 OSTEOARTHRITIS OF RIGHT KNEE, UNSPECIFIED OSTEOARTHRITIS TYPE: ICD-10-CM

## 2018-12-18 DIAGNOSIS — G89.4 CHRONIC PAIN SYNDROME: ICD-10-CM

## 2018-12-18 DIAGNOSIS — M48.061 SPINAL STENOSIS OF LUMBAR REGION, UNSPECIFIED WHETHER NEUROGENIC CLAUDICATION PRESENT: ICD-10-CM

## 2018-12-18 DIAGNOSIS — M47.817 FACET ARTHROPATHY, LUMBOSACRAL: ICD-10-CM

## 2018-12-18 DIAGNOSIS — E66.01 MORBID OBESITY (HCC): ICD-10-CM

## 2018-12-18 DIAGNOSIS — M19.011 BILATERAL SHOULDER REGION ARTHRITIS: ICD-10-CM

## 2018-12-18 PROCEDURE — 99213 OFFICE O/P EST LOW 20 MIN: CPT | Performed by: NURSE PRACTITIONER

## 2018-12-18 RX ORDER — NORTRIPTYLINE HYDROCHLORIDE 10 MG/1
10 CAPSULE ORAL NIGHTLY
Qty: 30 CAPSULE | Refills: 3 | Status: SHIPPED | OUTPATIENT
Start: 2018-12-18 | End: 2019-03-26 | Stop reason: SDUPTHER

## 2018-12-18 RX ORDER — HYDROCODONE BITARTRATE AND ACETAMINOPHEN 7.5; 325 MG/1; MG/1
1 TABLET ORAL 2 TIMES DAILY PRN
Qty: 60 TABLET | Refills: 0 | Status: SHIPPED | OUTPATIENT
Start: 2018-12-18 | End: 2019-01-22 | Stop reason: SDUPTHER

## 2018-12-18 RX ORDER — DULOXETIN HYDROCHLORIDE 30 MG/1
30 CAPSULE, DELAYED RELEASE ORAL DAILY
Qty: 30 CAPSULE | Refills: 1 | Status: SHIPPED | OUTPATIENT
Start: 2018-12-18 | End: 2019-01-22 | Stop reason: SDUPTHER

## 2018-12-18 NOTE — PROGRESS NOTES
Ms. Miller Harpswell list of medications were reviewed. Her current medications are   Outpatient Medications Prior to Visit   Medication Sig Dispense Refill    ASPIRIN LOW DOSE 81 MG EC tablet TAKE 1 TABLET BY MOUTH DAILY 30 tablet 5    clopidogrel (PLAVIX) 75 MG tablet Take 1 tablet by mouth daily 30 tablet 3    clopidogrel (PLAVIX) 75 MG tablet Take 1 tablet by mouth daily 30 tablet 3    fluticasone (FLONASE) 50 MCG/ACT nasal spray 2 sprays by Nasal route daily as needed for Rhinitis 1 Bottle 1    Cholecalciferol (VITAMIN D3) 1000 units CAPS Take 1 capsule by mouth daily 30 capsule 2    alendronate (FOSAMAX) 70 MG tablet Take 1 tablet by mouth every 7 days 4 tablet 3    pravastatin (PRAVACHOL) 40 MG tablet Take 1 tablet by mouth daily 30 tablet 3    benzonatate (TESSALON PERLES) 100 MG capsule Take 1 capsule by mouth 2 times daily as needed for Cough 20 capsule 0    diclofenac sodium (VOLTAREN) 1 % GEL Apply 2-4 g topically 2 times daily 5 Tube 0    nicotine (NICODERM CQ) 14 MG/24HR Place 1 patch onto the skin every 24 hours 30 patch 1    Blood Pressure Monitor MISC 1 Device by Does not apply route as needed (prn) 1 each 0    Blood Pressure Monitoring (5 SERIES BP MONITOR) RACHELL 1 Device by Does not apply route as needed (PRN) 1 Device 0    doxycycline hyclate (VIBRA-TABS) 100 MG tablet TAKE 1 TABLET BY MOUTH TWICE DAILY 60 tablet 0    cloNIDine (CATAPRES) 0.2 MG tablet Take 0.2 mg by mouth 3 times daily      albuterol sulfate HFA (PROAIR HFA) 108 (90 Base) MCG/ACT inhaler Inhale 2 puffs into the lungs 4 times daily as needed for Wheezing 1 Inhaler 0    calcium acetate (PHOSLO) 667 MG capsule Take by mouth 3 times daily (with meals)      minoxidil (LONITEN) 2.5 MG tablet TAKE 1 TABLET BY MOUTH TWICE DAILY 60 tablet 5    amLODIPine (NORVASC) 10 MG tablet Take 10 mg by mouth daily      HYDROcodone-acetaminophen (NORCO) 7.5-325 MG per tablet Take 1 tablet by mouth 2 times daily as needed for Pain for up to

## 2018-12-27 ENCOUNTER — HOSPITAL ENCOUNTER (OUTPATIENT)
Age: 73
Setting detail: OUTPATIENT SURGERY
Discharge: HOME | End: 2018-12-27
Attending: PHYSICAL MEDICINE & REHABILITATION | Admitting: PHYSICAL MEDICINE & REHABILITATION
Payer: MEDICAID

## 2018-12-27 VITALS
BODY MASS INDEX: 43.59 KG/M2 | HEART RATE: 67 BPM | SYSTOLIC BLOOD PRESSURE: 150 MMHG | WEIGHT: 246 LBS | HEIGHT: 63 IN | TEMPERATURE: 97 F | OXYGEN SATURATION: 100 % | RESPIRATION RATE: 15 BRPM | DIASTOLIC BLOOD PRESSURE: 57 MMHG

## 2018-12-27 PROCEDURE — 2580000003 HC RX 258: Performed by: PHYSICAL MEDICINE & REHABILITATION

## 2018-12-27 PROCEDURE — A9577 INJ MULTIHANCE: HCPCS | Performed by: PHYSICAL MEDICINE & REHABILITATION

## 2018-12-27 PROCEDURE — 7100000010 HC PHASE II RECOVERY - FIRST 15 MIN: Performed by: PHYSICAL MEDICINE & REHABILITATION

## 2018-12-27 PROCEDURE — 3600000002 HC SURGERY LEVEL 2 BASE: Performed by: PHYSICAL MEDICINE & REHABILITATION

## 2018-12-27 PROCEDURE — 6360000004 HC RX CONTRAST MEDICATION: Performed by: PHYSICAL MEDICINE & REHABILITATION

## 2018-12-27 PROCEDURE — 2709999900 HC NON-CHARGEABLE SUPPLY: Performed by: PHYSICAL MEDICINE & REHABILITATION

## 2018-12-27 PROCEDURE — 6360000002 HC RX W HCPCS: Performed by: PHYSICAL MEDICINE & REHABILITATION

## 2018-12-27 RX ORDER — 0.9 % SODIUM CHLORIDE 0.9 %
VIAL (ML) INJECTION PRN
Status: DISCONTINUED | OUTPATIENT
Start: 2018-12-27 | End: 2018-12-27 | Stop reason: HOSPADM

## 2018-12-27 RX ORDER — METHYLPREDNISOLONE ACETATE 80 MG/ML
INJECTION, SUSPENSION INTRA-ARTICULAR; INTRALESIONAL; INTRAMUSCULAR; SOFT TISSUE PRN
Status: DISCONTINUED | OUTPATIENT
Start: 2018-12-27 | End: 2018-12-27 | Stop reason: HOSPADM

## 2018-12-27 ASSESSMENT — PAIN SCALES - GENERAL: PAINLEVEL_OUTOF10: 0

## 2018-12-27 ASSESSMENT — ACTIVITIES OF DAILY LIVING (ADL): EFFECT OF PAIN ON DAILY ACTIVITIES: STANDING INCREASES PAIN

## 2018-12-27 ASSESSMENT — PAIN - FUNCTIONAL ASSESSMENT: PAIN_FUNCTIONAL_ASSESSMENT: 0-10

## 2018-12-27 ASSESSMENT — PAIN DESCRIPTION - DESCRIPTORS: DESCRIPTORS: ACHING;SHARP

## 2019-01-22 ENCOUNTER — OFFICE VISIT (OUTPATIENT)
Dept: PAIN MANAGEMENT | Age: 74
End: 2019-01-22
Payer: MEDICAID

## 2019-01-22 VITALS — DIASTOLIC BLOOD PRESSURE: 50 MMHG | OXYGEN SATURATION: 98 % | HEART RATE: 70 BPM | SYSTOLIC BLOOD PRESSURE: 90 MMHG

## 2019-01-22 DIAGNOSIS — M51.37 DDD (DEGENERATIVE DISC DISEASE), LUMBOSACRAL: ICD-10-CM

## 2019-01-22 DIAGNOSIS — M43.16 SPONDYLOLISTHESIS AT L4-L5 LEVEL: ICD-10-CM

## 2019-01-22 DIAGNOSIS — F51.01 PRIMARY INSOMNIA: ICD-10-CM

## 2019-01-22 DIAGNOSIS — E66.01 MORBID OBESITY (HCC): ICD-10-CM

## 2019-01-22 DIAGNOSIS — M47.817 FACET ARTHROPATHY, LUMBOSACRAL: ICD-10-CM

## 2019-01-22 DIAGNOSIS — M17.11 OSTEOARTHRITIS OF RIGHT KNEE, UNSPECIFIED OSTEOARTHRITIS TYPE: ICD-10-CM

## 2019-01-22 DIAGNOSIS — M48.061 SPINAL STENOSIS OF LUMBAR REGION, UNSPECIFIED WHETHER NEUROGENIC CLAUDICATION PRESENT: ICD-10-CM

## 2019-01-22 DIAGNOSIS — M19.012 BILATERAL SHOULDER REGION ARTHRITIS: ICD-10-CM

## 2019-01-22 DIAGNOSIS — G89.4 CHRONIC PAIN SYNDROME: ICD-10-CM

## 2019-01-22 DIAGNOSIS — M19.011 BILATERAL SHOULDER REGION ARTHRITIS: ICD-10-CM

## 2019-01-22 PROCEDURE — 99213 OFFICE O/P EST LOW 20 MIN: CPT | Performed by: NURSE PRACTITIONER

## 2019-01-22 RX ORDER — HYDROCODONE BITARTRATE AND ACETAMINOPHEN 7.5; 325 MG/1; MG/1
1 TABLET ORAL 2 TIMES DAILY PRN
Qty: 60 TABLET | Refills: 0 | Status: SHIPPED | OUTPATIENT
Start: 2019-01-22 | End: 2019-02-19 | Stop reason: SDUPTHER

## 2019-01-22 RX ORDER — DULOXETIN HYDROCHLORIDE 30 MG/1
30 CAPSULE, DELAYED RELEASE ORAL DAILY
Qty: 30 CAPSULE | Refills: 1 | Status: SHIPPED | OUTPATIENT
Start: 2019-01-22 | End: 2019-02-19 | Stop reason: SDUPTHER

## 2019-01-23 RX ORDER — DOXYCYCLINE HYCLATE 100 MG
TABLET ORAL
Qty: 180 TABLET | Refills: 0 | Status: SHIPPED | OUTPATIENT
Start: 2019-01-23 | End: 2019-05-15 | Stop reason: SDUPTHER

## 2019-01-29 ENCOUNTER — OFFICE VISIT (OUTPATIENT)
Dept: ORTHOPEDIC SURGERY | Age: 74
End: 2019-01-29
Payer: MEDICAID

## 2019-01-29 VITALS
BODY MASS INDEX: 45.36 KG/M2 | DIASTOLIC BLOOD PRESSURE: 56 MMHG | HEIGHT: 63 IN | SYSTOLIC BLOOD PRESSURE: 85 MMHG | HEART RATE: 78 BPM | WEIGHT: 256 LBS | TEMPERATURE: 97.1 F

## 2019-01-29 DIAGNOSIS — M19.011 BILATERAL SHOULDER REGION ARTHRITIS: Primary | ICD-10-CM

## 2019-01-29 DIAGNOSIS — M19.012 BILATERAL SHOULDER REGION ARTHRITIS: Primary | ICD-10-CM

## 2019-01-29 PROCEDURE — 20610 DRAIN/INJ JOINT/BURSA W/O US: CPT | Performed by: PHYSICIAN ASSISTANT

## 2019-02-05 ENCOUNTER — OFFICE VISIT (OUTPATIENT)
Dept: INTERNAL MEDICINE CLINIC | Age: 74
End: 2019-02-05
Payer: MEDICAID

## 2019-02-05 VITALS
SYSTOLIC BLOOD PRESSURE: 110 MMHG | HEIGHT: 63 IN | TEMPERATURE: 98.4 F | DIASTOLIC BLOOD PRESSURE: 80 MMHG | OXYGEN SATURATION: 93 % | BODY MASS INDEX: 45.35 KG/M2 | HEART RATE: 73 BPM

## 2019-02-05 DIAGNOSIS — I10 ESSENTIAL HYPERTENSION: Primary | ICD-10-CM

## 2019-02-05 DIAGNOSIS — Z23 NEED FOR VACCINATION FOR PNEUMOCOCCUS: ICD-10-CM

## 2019-02-05 DIAGNOSIS — R91.1 PULMONARY NODULE: ICD-10-CM

## 2019-02-05 DIAGNOSIS — N18.6 ESRD (END STAGE RENAL DISEASE) (HCC): ICD-10-CM

## 2019-02-05 DIAGNOSIS — M81.8 OTHER OSTEOPOROSIS WITHOUT CURRENT PATHOLOGICAL FRACTURE: ICD-10-CM

## 2019-02-05 DIAGNOSIS — E55.9 VITAMIN D DEFICIENCY: ICD-10-CM

## 2019-02-05 DIAGNOSIS — J30.9 ALLERGIC SINUSITIS: ICD-10-CM

## 2019-02-05 DIAGNOSIS — R05.3 PERSISTENT COUGH: ICD-10-CM

## 2019-02-05 DIAGNOSIS — E78.5 HYPERLIPIDEMIA LDL GOAL <100: ICD-10-CM

## 2019-02-05 PROCEDURE — 90471 IMMUNIZATION ADMIN: CPT | Performed by: INTERNAL MEDICINE

## 2019-02-05 PROCEDURE — 99214 OFFICE O/P EST MOD 30 MIN: CPT | Performed by: INTERNAL MEDICINE

## 2019-02-05 PROCEDURE — 90670 PCV13 VACCINE IM: CPT | Performed by: INTERNAL MEDICINE

## 2019-02-05 RX ORDER — ALENDRONATE SODIUM 70 MG/1
70 TABLET ORAL
Qty: 4 TABLET | Refills: 3 | Status: SHIPPED | OUTPATIENT
Start: 2019-02-05 | End: 2019-06-11 | Stop reason: SDUPTHER

## 2019-02-05 RX ORDER — FEXOFENADINE HCL 180 MG/1
180 TABLET ORAL DAILY PRN
Qty: 30 TABLET | Refills: 0 | Status: SHIPPED | OUTPATIENT
Start: 2019-02-05 | End: 2019-03-07

## 2019-02-05 RX ORDER — BROMPHENIRAMINE MALEATE, PSEUDOEPHEDRINE HYDROCHLORIDE, AND DEXTROMETHORPHAN HYDROBROMIDE 2; 30; 10 MG/5ML; MG/5ML; MG/5ML
5 SYRUP ORAL 4 TIMES DAILY PRN
Qty: 240 ML | Refills: 0 | Status: SHIPPED | OUTPATIENT
Start: 2019-02-05 | End: 2019-04-04 | Stop reason: ALTCHOICE

## 2019-02-05 RX ORDER — PRAVASTATIN SODIUM 40 MG
40 TABLET ORAL DAILY
Qty: 30 TABLET | Refills: 3 | Status: SHIPPED | OUTPATIENT
Start: 2019-02-05 | End: 2019-06-11 | Stop reason: SDUPTHER

## 2019-02-05 ASSESSMENT — PATIENT HEALTH QUESTIONNAIRE - PHQ9
1. LITTLE INTEREST OR PLEASURE IN DOING THINGS: 0
SUM OF ALL RESPONSES TO PHQ QUESTIONS 1-9: 0
2. FEELING DOWN, DEPRESSED OR HOPELESS: 0
SUM OF ALL RESPONSES TO PHQ9 QUESTIONS 1 & 2: 0
SUM OF ALL RESPONSES TO PHQ QUESTIONS 1-9: 0

## 2019-02-13 ENCOUNTER — TELEPHONE (OUTPATIENT)
Dept: INTERNAL MEDICINE CLINIC | Age: 74
End: 2019-02-13

## 2019-02-14 ENCOUNTER — OFFICE VISIT (OUTPATIENT)
Dept: ORTHOPEDIC SURGERY | Age: 74
End: 2019-02-14
Payer: MEDICAID

## 2019-02-14 VITALS
BODY MASS INDEX: 45.35 KG/M2 | HEIGHT: 63 IN | HEART RATE: 90 BPM | WEIGHT: 255.95 LBS | SYSTOLIC BLOOD PRESSURE: 112 MMHG | DIASTOLIC BLOOD PRESSURE: 82 MMHG

## 2019-02-14 DIAGNOSIS — M48.061 LUMBAR STENOSIS WITHOUT NEUROGENIC CLAUDICATION: Primary | ICD-10-CM

## 2019-02-14 DIAGNOSIS — M43.16 SPONDYLOLISTHESIS OF LUMBAR REGION: ICD-10-CM

## 2019-02-14 DIAGNOSIS — M48.061 LUMBAR FORAMINAL STENOSIS: ICD-10-CM

## 2019-02-14 PROCEDURE — 99213 OFFICE O/P EST LOW 20 MIN: CPT | Performed by: PHYSICAL MEDICINE & REHABILITATION

## 2019-02-19 ENCOUNTER — OFFICE VISIT (OUTPATIENT)
Dept: PAIN MANAGEMENT | Age: 74
End: 2019-02-19
Payer: MEDICAID

## 2019-02-19 VITALS — HEART RATE: 82 BPM | OXYGEN SATURATION: 97 % | DIASTOLIC BLOOD PRESSURE: 58 MMHG | SYSTOLIC BLOOD PRESSURE: 143 MMHG

## 2019-02-19 DIAGNOSIS — F51.01 PRIMARY INSOMNIA: ICD-10-CM

## 2019-02-19 DIAGNOSIS — E66.01 MORBID OBESITY (HCC): ICD-10-CM

## 2019-02-19 DIAGNOSIS — M43.16 SPONDYLOLISTHESIS AT L4-L5 LEVEL: ICD-10-CM

## 2019-02-19 DIAGNOSIS — M51.37 DDD (DEGENERATIVE DISC DISEASE), LUMBOSACRAL: ICD-10-CM

## 2019-02-19 DIAGNOSIS — M19.012 BILATERAL SHOULDER REGION ARTHRITIS: ICD-10-CM

## 2019-02-19 DIAGNOSIS — M19.011 BILATERAL SHOULDER REGION ARTHRITIS: ICD-10-CM

## 2019-02-19 DIAGNOSIS — G89.4 CHRONIC PAIN SYNDROME: ICD-10-CM

## 2019-02-19 DIAGNOSIS — M17.11 OSTEOARTHRITIS OF RIGHT KNEE, UNSPECIFIED OSTEOARTHRITIS TYPE: ICD-10-CM

## 2019-02-19 DIAGNOSIS — M47.817 FACET ARTHROPATHY, LUMBOSACRAL: ICD-10-CM

## 2019-02-19 DIAGNOSIS — M48.061 SPINAL STENOSIS OF LUMBAR REGION, UNSPECIFIED WHETHER NEUROGENIC CLAUDICATION PRESENT: ICD-10-CM

## 2019-02-19 PROCEDURE — 99213 OFFICE O/P EST LOW 20 MIN: CPT | Performed by: NURSE PRACTITIONER

## 2019-02-19 RX ORDER — DULOXETIN HYDROCHLORIDE 30 MG/1
30 CAPSULE, DELAYED RELEASE ORAL DAILY
Qty: 30 CAPSULE | Refills: 1 | Status: SHIPPED | OUTPATIENT
Start: 2019-02-19 | End: 2019-03-26 | Stop reason: SDUPTHER

## 2019-02-19 RX ORDER — HYDROCODONE BITARTRATE AND ACETAMINOPHEN 7.5; 325 MG/1; MG/1
1 TABLET ORAL 2 TIMES DAILY PRN
Qty: 60 TABLET | Refills: 0 | Status: SHIPPED | OUTPATIENT
Start: 2019-02-19 | End: 2019-03-26 | Stop reason: SDUPTHER

## 2019-02-22 DIAGNOSIS — E78.5 HYPERLIPIDEMIA LDL GOAL <100: ICD-10-CM

## 2019-02-26 RX ORDER — PRAVASTATIN SODIUM 40 MG
40 TABLET ORAL DAILY
Qty: 30 TABLET | Refills: 0 | Status: SHIPPED | OUTPATIENT
Start: 2019-02-26 | End: 2019-06-06

## 2019-02-28 ENCOUNTER — HOSPITAL ENCOUNTER (OUTPATIENT)
Dept: WOMENS IMAGING | Age: 74
Discharge: HOME | End: 2019-02-28
Payer: MEDICAID

## 2019-02-28 DIAGNOSIS — R59.0 AXILLARY LYMPHADENOPATHY: ICD-10-CM

## 2019-02-28 PROCEDURE — 77067 SCR MAMMO BI INCL CAD: CPT

## 2019-03-26 ENCOUNTER — OFFICE VISIT (OUTPATIENT)
Dept: PAIN MANAGEMENT | Age: 74
End: 2019-03-26
Payer: MEDICAID

## 2019-03-26 VITALS — HEART RATE: 88 BPM | OXYGEN SATURATION: 98 % | SYSTOLIC BLOOD PRESSURE: 106 MMHG | DIASTOLIC BLOOD PRESSURE: 63 MMHG

## 2019-03-26 DIAGNOSIS — M19.012 BILATERAL SHOULDER REGION ARTHRITIS: ICD-10-CM

## 2019-03-26 DIAGNOSIS — M17.11 OSTEOARTHRITIS OF RIGHT KNEE, UNSPECIFIED OSTEOARTHRITIS TYPE: ICD-10-CM

## 2019-03-26 DIAGNOSIS — M19.011 BILATERAL SHOULDER REGION ARTHRITIS: ICD-10-CM

## 2019-03-26 DIAGNOSIS — M48.061 SPINAL STENOSIS OF LUMBAR REGION, UNSPECIFIED WHETHER NEUROGENIC CLAUDICATION PRESENT: ICD-10-CM

## 2019-03-26 DIAGNOSIS — F51.01 PRIMARY INSOMNIA: ICD-10-CM

## 2019-03-26 DIAGNOSIS — M47.817 FACET ARTHROPATHY, LUMBOSACRAL: ICD-10-CM

## 2019-03-26 DIAGNOSIS — G89.4 CHRONIC PAIN SYNDROME: ICD-10-CM

## 2019-03-26 DIAGNOSIS — M43.16 SPONDYLOLISTHESIS AT L4-L5 LEVEL: ICD-10-CM

## 2019-03-26 DIAGNOSIS — E66.01 MORBID OBESITY (HCC): ICD-10-CM

## 2019-03-26 DIAGNOSIS — M51.37 DDD (DEGENERATIVE DISC DISEASE), LUMBOSACRAL: ICD-10-CM

## 2019-03-26 PROCEDURE — 99213 OFFICE O/P EST LOW 20 MIN: CPT | Performed by: NURSE PRACTITIONER

## 2019-03-26 RX ORDER — DULOXETIN HYDROCHLORIDE 30 MG/1
30 CAPSULE, DELAYED RELEASE ORAL DAILY
Qty: 30 CAPSULE | Refills: 1 | Status: SHIPPED | OUTPATIENT
Start: 2019-03-26 | End: 2019-04-23 | Stop reason: SDUPTHER

## 2019-03-26 RX ORDER — HYDROCODONE BITARTRATE AND ACETAMINOPHEN 7.5; 325 MG/1; MG/1
1 TABLET ORAL 2 TIMES DAILY PRN
Qty: 60 TABLET | Refills: 0 | Status: SHIPPED | OUTPATIENT
Start: 2019-03-26 | End: 2019-04-23 | Stop reason: SDUPTHER

## 2019-03-26 RX ORDER — NORTRIPTYLINE HYDROCHLORIDE 10 MG/1
10 CAPSULE ORAL NIGHTLY
Qty: 30 CAPSULE | Refills: 3 | Status: SHIPPED | OUTPATIENT
Start: 2019-03-26 | End: 2019-04-23 | Stop reason: SDUPTHER

## 2019-04-03 ENCOUNTER — TELEPHONE (OUTPATIENT)
Dept: INTERNAL MEDICINE CLINIC | Age: 74
End: 2019-04-03

## 2019-04-03 NOTE — TELEPHONE ENCOUNTER
Junior from [address] passport calling to get rx for lift chair please include height and weight on rx

## 2019-04-04 ENCOUNTER — OFFICE VISIT (OUTPATIENT)
Dept: INTERNAL MEDICINE CLINIC | Age: 74
End: 2019-04-04
Payer: MEDICAID

## 2019-04-04 VITALS
BODY MASS INDEX: 43.59 KG/M2 | HEIGHT: 63 IN | TEMPERATURE: 98.6 F | DIASTOLIC BLOOD PRESSURE: 76 MMHG | OXYGEN SATURATION: 98 % | SYSTOLIC BLOOD PRESSURE: 130 MMHG | WEIGHT: 246 LBS | HEART RATE: 71 BPM

## 2019-04-04 DIAGNOSIS — E55.9 VITAMIN D DEFICIENCY: ICD-10-CM

## 2019-04-04 DIAGNOSIS — R91.1 PULMONARY NODULE: ICD-10-CM

## 2019-04-04 DIAGNOSIS — J30.9 ALLERGIC SINUSITIS: ICD-10-CM

## 2019-04-04 DIAGNOSIS — F32.89 OTHER DEPRESSION: ICD-10-CM

## 2019-04-04 DIAGNOSIS — I10 ESSENTIAL HYPERTENSION: ICD-10-CM

## 2019-04-04 DIAGNOSIS — G47.09 OTHER INSOMNIA: ICD-10-CM

## 2019-04-04 DIAGNOSIS — M81.8 OTHER OSTEOPOROSIS WITHOUT CURRENT PATHOLOGICAL FRACTURE: ICD-10-CM

## 2019-04-04 DIAGNOSIS — N18.6 ESRD (END STAGE RENAL DISEASE) (HCC): ICD-10-CM

## 2019-04-04 DIAGNOSIS — R05.3 PERSISTENT COUGH: Primary | ICD-10-CM

## 2019-04-04 DIAGNOSIS — R12 HEARTBURN: ICD-10-CM

## 2019-04-04 DIAGNOSIS — E78.5 HYPERLIPIDEMIA LDL GOAL <100: ICD-10-CM

## 2019-04-04 PROCEDURE — 99214 OFFICE O/P EST MOD 30 MIN: CPT | Performed by: INTERNAL MEDICINE

## 2019-04-04 RX ORDER — RANITIDINE 150 MG/1
150 TABLET ORAL 2 TIMES DAILY
Qty: 30 TABLET | Refills: 3 | Status: SHIPPED | OUTPATIENT
Start: 2019-04-04 | End: 2019-06-06

## 2019-04-04 RX ORDER — FEXOFENADINE HCL 180 MG/1
180 TABLET ORAL DAILY PRN
Qty: 30 TABLET | Refills: 1 | Status: SHIPPED | OUTPATIENT
Start: 2019-04-04 | End: 2019-05-04

## 2019-04-04 RX ORDER — BENZONATATE 100 MG/1
100 CAPSULE ORAL 2 TIMES DAILY PRN
Qty: 30 CAPSULE | Refills: 0 | Status: SHIPPED | OUTPATIENT
Start: 2019-04-04 | End: 2019-10-02 | Stop reason: SDUPTHER

## 2019-04-04 RX ORDER — TRAZODONE HYDROCHLORIDE 50 MG/1
50 TABLET ORAL NIGHTLY PRN
Qty: 30 TABLET | Refills: 1 | Status: SHIPPED | OUTPATIENT
Start: 2019-04-04 | End: 2019-06-11 | Stop reason: DRUGHIGH

## 2019-04-04 ASSESSMENT — PATIENT HEALTH QUESTIONNAIRE - PHQ9
SUM OF ALL RESPONSES TO PHQ9 QUESTIONS 1 & 2: 2
2. FEELING DOWN, DEPRESSED OR HOPELESS: 1
SUM OF ALL RESPONSES TO PHQ QUESTIONS 1-9: 2
1. LITTLE INTEREST OR PLEASURE IN DOING THINGS: 1
SUM OF ALL RESPONSES TO PHQ QUESTIONS 1-9: 2

## 2019-04-04 NOTE — PROGRESS NOTES
SUBJECTIVE:  Aleena Jeffery is a 66 y.o. female [address] Complaint   Patient presents with    Cough    Hypertension        PT HERE FOR EVAL       COUGH - PERSISTENT.  STATES ALL DAY. PRODUCTIVE CLEAR PHLEGM, NO HEMOPTYSIS. NO F/C. COUGH MED DID NOT HELP. DENIES WHEEZING  HTN -  ON MEDS . OCC HEADACHE , DIZZINESS No.   ALLERGIC SINUSITIS - + NASAL CONGESTION, + POSTNASAL DRAINAGE , NO SINUS PRESSURE,OCC HA, + SNEEZING, + WATERY ITCHY EYES.  C/O HEARTBURN ? DURATION. INTERMITTENT. ? NO BELCHING  HLP -  TAKING PRAVACHOL. EXERCISE / DIET COMPLIANCE . NO MUSCLE ACHES. LABS D/W PT  VIT D DEF -  TAKING MED.  LABS D/W PT. .   C/O DEPRESSION - ? DURATION. STRESS - STATES LOST DAUGHTER 2 YEARS AGO AND RECENTLY LOST GRANDDAUGHTER. + INSOMNIA. DENIES SUICIDAL / NO HOMICIDAL THOUGHTS / IDEATION   C/O INSOMNIA - ? DURATION. PT REQUESTING MED  PULM. NODULE - LAST CT - D/W PT  OSTEOPOROSIS - ? MED COMPLIANCE  ESRD - FOLLOWING WITH NEPHROLOGY. ON DIALYSIS- TOLERATING. PT DEFERRED LIFT CHAIR AT THIS TIME    DENIES CP, No SOB, No PALPITATIONS  No ABD PAIN, No N/V, No DIARRHEA, No CONSTIPATION, No MELENA, No HEMATOCHEZIA. No DYSURIA, No FREQ, No URGENCY, No HEMATURIA    PMH: REVIEWED AND UPDATED TODAY    PSH: REVIEWED AND UPDATED TODAY    SOCIAL HX: REVIEWED AND UPDATED TODAY    FAMILY HX: REVIEWED AND UPDATED TODAY    ALLERGY:  Iv dye [iodides]; Lisinopril; Peanut oil; Hydralazine; and Lipitor [atorvastatin]    MEDS: REVIEWED  Prior to Visit Medications    Medication Sig Taking?  Authorizing Provider   pravastatin (PRAVACHOL) 40 MG tablet TAKE 1 TABLET BY MOUTH DAILY Yes Chris Salazar MD   nortriptyline (PAMELOR) 10 MG capsule Take 1 capsule by mouth nightly Yes TORSTEN Mar CNP   diclofenac sodium (VOLTAREN) 1 % GEL Apply 2-4 g topically 2 times daily Yes TORSTEN Mar CNP   DULoxetine (CYMBALTA) 30 MG extended release capsule Take 1 capsule by mouth daily Yes TORSTEN Mar CNP HYDROcodone-acetaminophen (NORCO) 7.5-325 MG per tablet Take 1 tablet by mouth 2 times daily as needed for Pain for up to 30 days.  Yes TORSTEN Ho CNP   pravastatin (PRAVACHOL) 40 MG tablet TAKE 1 TABLET BY MOUTH DAILY Yes Keegan Herrera MD   alendronate (FOSAMAX) 70 MG tablet Take 1 tablet by mouth every 7 days Yes Keegan Herrera MD   pravastatin (PRAVACHOL) 40 MG tablet Take 1 tablet by mouth daily Yes Keegan Herrera MD   brompheniramine-pseudoephedrine-DM (BROMFED DM) 2-30-10 MG/5ML syrup Take 5 mLs by mouth 4 times daily as needed for Cough Yes Keegan Herrera MD   Cholecalciferol (VITAMIN D3) 1000 units CAPS Take 1 capsule by mouth daily Yes Keegan Herrera MD   doxycycline hyclate (VIBRA-TABS) 100 MG tablet TAKE 1 TABLET BY MOUTH TWICE DAILY Yes Kimberli Stewart MD   ASPIRIN LOW DOSE 81 MG EC tablet TAKE 1 TABLET BY MOUTH DAILY Yes TORSTEN Reilly CNP   clopidogrel (PLAVIX) 75 MG tablet Take 1 tablet by mouth daily Yes TORSTEN Reilly CNP   fluticasone (FLONASE) 50 MCG/ACT nasal spray 2 sprays by Nasal route daily as needed for Rhinitis Yes Keegan Herrera MD   Blood Pressure Monitor MISC 1 Device by Does not apply route as needed (prn) Yes Keegan Herrera MD   Blood Pressure Monitoring (5 SERIES BP MONITOR) RACHELL 1 Device by Does not apply route as needed (PRN) Yes Keegan Herrera MD   cloNIDine (CATAPRES) 0.2 MG tablet Take 0.2 mg by mouth 3 times daily Yes Historical Provider, MD   albuterol sulfate HFA (PROAIR HFA) 108 (90 Base) MCG/ACT inhaler Inhale 2 puffs into the lungs 4 times daily as needed for Wheezing Yes Keegan Herrera MD   calcium acetate (PHOSLO) 667 MG capsule Take by mouth 3 times daily (with meals) Yes Historical Provider, MD   minoxidil (LONITEN) 2.5 MG tablet TAKE 1 TABLET BY MOUTH TWICE DAILY Yes Sera Partida MD   amLODIPine (NORVASC) 10 MG tablet Take 10 mg by mouth daily Yes Historical Provider, MD       ROS: COMPREHENSIVE ROS AS IN HX, REST -VE  History obtained from chart review and the patient    OBJECTIVE:   NURSING NOTE AND VITALS REVIEWED  /60 (Site: Right Upper Arm, Position: Sitting, Cuff Size: Large Adult)   Pulse 71   Ht 5' 3\" (1.6 m)   Wt 246 lb (111.6 kg) Comment: at HD - per pt  LMP 01/21/1964   SpO2 98%   BMI 43.58 kg/m²     + OCCASIONAL COUGHING OTHERWISE, NO ACUTE DISTRESS    REPEAT BP: 130/76 (RT)    TEMP: 98.6F     Body mass index is 43.58 kg/m². HEENT: NO PALLOR, ANICTERIC, PERRLA, EOMI, NO CONJUNCTIVAL ERYTHEMA,                 NO SINUS TENDERNESS  NECK:  SUPPLE, TRACHEA MIDLINE, NT, NO JVD, NO CB, NO LA, NO TM, NO STIFFNESS  CHEST: RESPY EFFORT NL, GOOD AE, NO W/R/C  HEART: S1S2+ REG, NO M/G/R  ABD: OBESE, SOFT, NT, NO HSM, BS+  EXT: NO EDEMA, NT, PULSES +. KEARA'S -VE  NEURO: ALERT AND ORIENTED X 3, NO MENINGEAL SIGNS, NO TREMORS, AMBULATING WITH A POWER CHAIR, NO NEW FOCAL DEFICITS  PSYCH: FAIRLY GOOD AFFECT  BACK: NT, NO ROM, NO CVA TENDERNESS    PREVIOUS LABS / X RAY REVIEWED AND D/W PT       ASSESSMENT / PLAN:     Diagnosis Orders   1. Persistent cough  COUNSELLED. CHANGE TO TESSALON PERLES BID / PRN  DEFERRED REPEAT CXR - READDRESS  ADVISED SYMPTOMATIC RX. MONITOR  MAKE CHANGES AS NEEDED. 2. Essential hypertension  COUNSELLED. CONTINUE MEDS. LOW NA+ / DASH DIET/ EXERCISE. MONITOR. GOAL </= 120/80  MAKE CHANGES AS NEEDED. 3. Allergic sinusitis  COUNSELLED. ADVISED START ON  ALLEGRA 180 MG DAILY/PRN. CONTINUE FLONASE PRN  MONITOR. MAKE CHANGES AS NEEDED. 4. Heartburn  COUNSELLED. START ON RANITIDINE 150 MG BID  ANTIREFLUX PRECAUTIONS ADVISED. MONITOR. MAKE CHANGES AS NEEDED. 5. Hyperlipidemia LDL goal <100  COUNSELLED. ADVISED LOW FAT / CHOL DIET/ EXERCISE.  MONITOR ON MED.  GOALS D/W PT.  MAKE CHANGES AS NEEDED. 6. Vitamin D deficiency  COUNSELLED. MONITOR ON VIT D SUPPLEMENT. MAKE CHANGES AS NEEDED. 7. Other depression  COUNSELLED. ? SITUATIONAL.  DEFERRED MED  PT COUNSELLED  ON RELAXATION TECHNIQUES. ADDRESSED STRESS MGT. MONITOR  PT NOT SUICIDAL/ NOT HOMICIDAL  MAKE CHANGES AS NEEDED. 8. Other insomnia  COUNSELLED. START ON TRAZODONE 50 MG QHS / PRN  SLEEP HYGIENE ADVISED. MONITOR  MAKE CHANGES AS NEEDED. 9. Pulmonary nodule  COUNSELLED. READDRESS F/U CT . MONITOR  MAKE CHANGES AS NEEDED. 10. Other osteoporosis without current pathological fracture  COUNSELLED. CONTINUE FOSAMAX  ADVISED MOE/ VIT D. EXERCISES. MONITOR. MAKE CHANGES AS NEEDED. 11. ESRD (end stage renal disease) ([facility] [address]  COUNSELLED. ON HD. CONTINUE MGT PER RENAL  MAKE CHANGES AS NEEDED. Quality & Risk Score Accuracy    Last edited 04/04/19 15:42 EDT by Phuong Lind MD           Maegan received counseling on the following healthy behaviors: nutrition, exercise and medication adherence    Patient given educational materials on Hyperlipidemia, Nutrition, Exercise and Hypertension    I have instructed Maegan to complete a self tracking handout on Blood Pressures  and Weights and instructed them to bring it with them to her next appointment. Discussed use, benefit, and side effects of prescribed medications. Barriers to medication compliance addressed. All patient questions answered. Pt voiced understanding.            MEDICATION SIDE EFFECTS D/W PATIENT    PT STABLE AT TIME OF D/C.    RETURN TO CLINIC WITHIN 2 MONTHS / PRN

## 2019-04-23 ENCOUNTER — OFFICE VISIT (OUTPATIENT)
Dept: PAIN MANAGEMENT | Age: 74
End: 2019-04-23
Payer: MEDICAID

## 2019-04-23 VITALS — SYSTOLIC BLOOD PRESSURE: 121 MMHG | OXYGEN SATURATION: 98 % | DIASTOLIC BLOOD PRESSURE: 66 MMHG | HEART RATE: 74 BPM

## 2019-04-23 DIAGNOSIS — G89.4 CHRONIC PAIN SYNDROME: ICD-10-CM

## 2019-04-23 DIAGNOSIS — M43.16 SPONDYLOLISTHESIS AT L4-L5 LEVEL: ICD-10-CM

## 2019-04-23 DIAGNOSIS — M51.37 DDD (DEGENERATIVE DISC DISEASE), LUMBOSACRAL: ICD-10-CM

## 2019-04-23 DIAGNOSIS — E66.01 MORBID OBESITY (HCC): ICD-10-CM

## 2019-04-23 DIAGNOSIS — M19.012 BILATERAL SHOULDER REGION ARTHRITIS: ICD-10-CM

## 2019-04-23 DIAGNOSIS — M48.061 SPINAL STENOSIS OF LUMBAR REGION, UNSPECIFIED WHETHER NEUROGENIC CLAUDICATION PRESENT: ICD-10-CM

## 2019-04-23 DIAGNOSIS — F51.01 PRIMARY INSOMNIA: ICD-10-CM

## 2019-04-23 DIAGNOSIS — M17.11 OSTEOARTHRITIS OF RIGHT KNEE, UNSPECIFIED OSTEOARTHRITIS TYPE: ICD-10-CM

## 2019-04-23 DIAGNOSIS — M19.011 BILATERAL SHOULDER REGION ARTHRITIS: ICD-10-CM

## 2019-04-23 DIAGNOSIS — M47.817 FACET ARTHROPATHY, LUMBOSACRAL: ICD-10-CM

## 2019-04-23 PROCEDURE — 99213 OFFICE O/P EST LOW 20 MIN: CPT | Performed by: NURSE PRACTITIONER

## 2019-04-23 RX ORDER — HYDROCODONE BITARTRATE AND ACETAMINOPHEN 7.5; 325 MG/1; MG/1
1 TABLET ORAL 2 TIMES DAILY PRN
Qty: 60 TABLET | Refills: 0 | Status: SHIPPED | OUTPATIENT
Start: 2019-04-23 | End: 2019-05-28 | Stop reason: SDUPTHER

## 2019-04-23 RX ORDER — DULOXETIN HYDROCHLORIDE 30 MG/1
30 CAPSULE, DELAYED RELEASE ORAL DAILY
Qty: 30 CAPSULE | Refills: 1 | Status: SHIPPED | OUTPATIENT
Start: 2019-04-23 | End: 2019-05-28 | Stop reason: SDUPTHER

## 2019-04-23 RX ORDER — NORTRIPTYLINE HYDROCHLORIDE 10 MG/1
10 CAPSULE ORAL NIGHTLY
Qty: 30 CAPSULE | Refills: 3 | Status: SHIPPED | OUTPATIENT
Start: 2019-04-23 | End: 2019-05-28

## 2019-04-23 NOTE — PATIENT INSTRUCTIONS
[email or web address] and sign in to your iconDial account. Enter R549 in the [employer] box to learn more about \"Back Stretches: Exercises. \"     If you do not have an account, please click on the \"Sign Up Now\" link. Current as of: September 20, 2018  Content Version: 11.9  © 2447-7754 [employer]. Care instructions adapted under license by [facility] ([facility]). If you have questions about a medical condition or this instruction, always ask your healthcare professional. [address] any warranty or liability for your use of this information. Patient Education        Shoulder Arthritis: Exercises  Your Care Instructions  Here are some examples of typical rehabilitation exercises for your condition. Start each exercise slowly. Ease off the exercise if you start to have pain. Your doctor or physical therapist will tell you when you can start these exercises and which ones will work best for you. How to do the exercises  Shoulder flexion (lying down)    1. Lie on your back, holding a wand with both hands. Your palms should face down as you hold the wand. 2. Keeping your elbows straight, slowly raise your arms over your head. Raise them until you feel a stretch in your shoulders, upper back, and chest.  3. Hold for 15 to 30 seconds. 4. Repeat 2 to 4 times. Shoulder rotation (lying down)    1. Lie on your back. Hold a wand with both hands with your elbows bent and palms up. 2. Keep your elbows close to your body, and move the wand across your body toward the sore arm. 3. Hold for 8 to 12 seconds. 4. Repeat 2 to 4 times. Shoulder internal rotation with towel    1. Hold a towel above and behind your head with the arm that is not sore. 2. With your sore arm, reach behind your back and grasp the towel. 3. With the arm above your head, pull the towel upward.  Do this until you feel a stretch on the front and outside of your sore shoulder. 4. Hold 15 to 30 seconds. 5. Repeat 2 to 4 times. Shoulder blade squeeze    1. Stand with your arms at your sides, and squeeze your shoulder blades together. Do not raise your shoulders up as you squeeze. 2. Hold 6 seconds. 3. Repeat 8 to 12 times. Resisted rows    1. Put the band around a solid object at about waist level. (A bedpost will work well.) Each hand should hold an end of the band. 2. With your elbows at your sides and bent to 90 degrees, pull the band back. Your shoulder blades should move toward each other. Return to the starting position. 3. Repeat 8 to 12 times. External rotator strengthening exercise    1. Start by tying a piece of elastic exercise material to a doorknob. You can use surgical tubing or Thera-Band. (You may also hold one end of the band in each hand.)  2. Stand or sit with your shoulder relaxed and your elbow bent 90 degrees. Your upper arm should rest comfortably against your side. Squeeze a rolled towel between your elbow and your body for comfort. This will help keep your arm at your side. 3. Hold one end of the elastic band with the hand of the painful arm. 4. Start with your forearm across your belly. Slowly rotate the forearm out away from your body. Keep your elbow and upper arm tucked against the towel roll or the side of your body until you begin to feel tightness in your shoulder. Slowly move your arm back to where you started. 5. Repeat 8 to 12 times. Internal rotator strengthening exercise    1. Start by tying a piece of elastic exercise material to a doorknob. You can use surgical tubing or Thera-Band. 2. Stand or sit with your shoulder relaxed and your elbow bent 90 degrees. Your upper arm should rest comfortably against your side. Squeeze a rolled towel between your elbow and your body for comfort. This will help keep your arm at your side. 3. Hold one end of the elastic band in the hand of the painful arm.   4. Slowly rotate your forearm toward your body until it touches your belly. Slowly move it back to where you started. 5. Keep your elbow and upper arm firmly tucked against the towel roll or at your side. 6. Repeat 8 to 12 times. Pendulum swing    1. Hold on to a table or the back of a chair with your good arm. Then bend forward a little and let your sore arm hang straight down. This exercise does not use the arm muscles. Rather, use your legs and your hips to create movement that makes your arm swing freely. 2. Use the movement from your hips and legs to guide the slightly swinging arm back and forth like a pendulum (or elephant trunk). Then guide it in circles that start small (about the size of a dinner plate). Make the circles a bit larger each day, as your pain allows. 3. Do this exercise for 5 minutes, 5 to 7 times each day. 4. As you have less pain, try bending over a little farther to do this exercise. This will increase the amount of movement at your shoulder. Follow-up care is a key part of your treatment and safety. Be sure to make and go to all appointments, and call your doctor if you are having problems. It's also a good idea to know your test results and keep a list of the medicines you take. Where can you learn more? Go to [email or web address] and sign in to your [employer] account. Enter H562 in the [facility] box to learn more about \"Shoulder Arthritis: Exercises. \"     If you do not have an account, please click on the \"Sign Up Now\" link. Current as of: September 20, 2018  Content Version: 11.9  © 6063-3018 [employer]. Care instructions adapted under license by [address] If you have questions about a medical condition or this instruction, always ask your healthcare professional. [place] [address] any warranty or liability for your use of this information.

## 2019-04-23 NOTE — PROGRESS NOTES
reviewed     MEDICATIONS: Ms. Amos Lesches list of medications were reviewed. Her current medications are   Outpatient Medications Prior to Visit   Medication Sig Dispense Refill    benzonatate (TESSALON) 100 MG capsule Take 1 capsule by mouth 2 times daily as needed for Cough 30 capsule 0    ranitidine (ZANTAC) 150 MG tablet Take 1 tablet by mouth 2 times daily 30 tablet 3    fexofenadine (ALLEGRA) 180 MG tablet Take 1 tablet by mouth daily as needed (PRN) 30 tablet 1    traZODone (DESYREL) 50 MG tablet Take 1 tablet by mouth nightly as needed for Sleep 30 tablet 1    pravastatin (PRAVACHOL) 40 MG tablet TAKE 1 TABLET BY MOUTH DAILY 30 tablet 3    pravastatin (PRAVACHOL) 40 MG tablet TAKE 1 TABLET BY MOUTH DAILY 30 tablet 0    alendronate (FOSAMAX) 70 MG tablet Take 1 tablet by mouth every 7 days 4 tablet 3    pravastatin (PRAVACHOL) 40 MG tablet Take 1 tablet by mouth daily 30 tablet 3    Cholecalciferol (VITAMIN D3) 1000 units CAPS Take 1 capsule by mouth daily 30 capsule 2    doxycycline hyclate (VIBRA-TABS) 100 MG tablet TAKE 1 TABLET BY MOUTH TWICE DAILY 180 tablet 0    ASPIRIN LOW DOSE 81 MG EC tablet TAKE 1 TABLET BY MOUTH DAILY 30 tablet 5    clopidogrel (PLAVIX) 75 MG tablet Take 1 tablet by mouth daily 30 tablet 3    fluticasone (FLONASE) 50 MCG/ACT nasal spray 2 sprays by Nasal route daily as needed for Rhinitis 1 Bottle 1    Blood Pressure Monitor MISC 1 Device by Does not apply route as needed (prn) 1 each 0    Blood Pressure Monitoring (5 SERIES BP MONITOR) RACHELL 1 Device by Does not apply route as needed (PRN) 1 Device 0    cloNIDine (CATAPRES) 0.2 MG tablet Take 0.2 mg by mouth 3 times daily      albuterol sulfate HFA (PROAIR HFA) 108 (90 Base) MCG/ACT inhaler Inhale 2 puffs into the lungs 4 times daily as needed for Wheezing 1 Inhaler 0    calcium acetate (PHOSLO) 667 MG capsule Take by mouth 3 times daily (with meals)      minoxidil (LONITEN) 2.5 MG tablet TAKE 1 TABLET BY MOUTH TWICE DAILY 60 tablet 5    amLODIPine (NORVASC) 10 MG tablet Take 10 mg by mouth daily      nortriptyline (PAMELOR) 10 MG capsule Take 1 capsule by mouth nightly 30 capsule 3    diclofenac sodium (VOLTAREN) 1 % GEL Apply 2-4 g topically 2 times daily 5 Tube 0    DULoxetine (CYMBALTA) 30 MG extended release capsule Take 1 capsule by mouth daily 30 capsule 1    HYDROcodone-acetaminophen (NORCO) 7.5-325 MG per tablet Take 1 tablet by mouth 2 times daily as needed for Pain for up to 30 days. 60 tablet 0     No facility-administered medications prior to visit. SOCIAL/FAMILY/PAST MEDICAL HISTORY: Ms. Marj Raya, family and past medical history was reviewed. REVIEW OF SYSTEMS:    Respiratory: Negative for apnea, chest tightness and shortness of breath or change in baseline breathing. Gastrointestinal: Negative for nausea, vomiting, abdominal pain, diarrhea, constipation, blood in stool and abdominal distention. PHYSICAL EXAM:   Nursing note and vitals reviewed. /66   Pulse 74   LMP 01/21/1964   SpO2 98%   Constitutional: She appears well-developed and well-nourished. No acute distress. Skin: Skin is warm and dry, good turgor. No rash noted. She is not diaphoretic. Cardiovascular: Normal rate, regular rhythm, normal heart sounds, and does not have murmur. Pulmonary/Chest: Effort normal. No respiratory distress. She does not have wheezes in the lung fields. She has no rales. Neurological/Psychiatric:She is alert and oriented to person, place, and time. Coordination is  Normal. Came in by wheel chair  Her mood isAppropriate and affect is Neutral/Euthymic(normal)     IMPRESSION:   1. Chronic pain syndrome    2. DDD (degenerative disc disease), lumbosacral    3. Facet arthropathy, lumbosacral    4. Spondylolisthesis at L4-L5 level    5. Spinal stenosis of lumbar region, unspecified whether neurogenic claudication present    6. Bilateral shoulder region arthritis    7. Osteoarthritis of right knee, unspecified osteoarthritis type    8. Morbid obesity ([address]    9. Primary insomnia        PLAN:  Informed verbal consent was obtained  -Continue with Norco PamelorVigneshmbalkeith VTalgel  -Jeimy exercises  -Maintain f/u with orthopedics for OA of the shoulders  -She was advised weight reduction, diet changes- 800-1200 sierra diet, diet diary, exercising, nutritional  consult increased physical activity as tolerated  -CBT techniques- relaxation therapies such as biofeedback, mindfulness based stress reduction, imagery, cognitive restructuring, problem solving discussed with patient  -Last UDS consistent  -Return in about 1 month (around 5/21/2019). Current Outpatient Medications   Medication Sig Dispense Refill    DULoxetine (CYMBALTA) 30 MG extended release capsule Take 1 capsule by mouth daily 30 capsule 1    diclofenac sodium (VOLTAREN) 1 % GEL Apply 2-4 g topically 2 times daily 5 Tube 0    HYDROcodone-acetaminophen (NORCO) 7.5-325 MG per tablet Take 1 tablet by mouth 2 times daily as needed for Pain for up to 30 days.  60 tablet 0    nortriptyline (PAMELOR) 10 MG capsule Take 1 capsule by mouth nightly 30 capsule 3    benzonatate (TESSALON) 100 MG capsule Take 1 capsule by mouth 2 times daily as needed for Cough 30 capsule 0    ranitidine (ZANTAC) 150 MG tablet Take 1 tablet by mouth 2 times daily 30 tablet 3    fexofenadine (ALLEGRA) 180 MG tablet Take 1 tablet by mouth daily as needed (PRN) 30 tablet 1    traZODone (DESYREL) 50 MG tablet Take 1 tablet by mouth nightly as needed for Sleep 30 tablet 1    pravastatin (PRAVACHOL) 40 MG tablet TAKE 1 TABLET BY MOUTH DAILY 30 tablet 3    pravastatin (PRAVACHOL) 40 MG tablet TAKE 1 TABLET BY MOUTH DAILY 30 tablet 0    alendronate (FOSAMAX) 70 MG tablet Take 1 tablet by mouth every 7 days 4 tablet 3    pravastatin (PRAVACHOL) 40 MG tablet Take 1 tablet by mouth daily 30 tablet 3    Cholecalciferol (VITAMIN D3) 1000 units CAPS the patient. Informed verbal consent was obtained. Goals of current treatment regimen include improvement in pain, restoration of functioning- with focus on improvement in physical performance, general activity, work or disability,emotional distress, health care utilization and  decreased medication consumption. Will continue to monitor progress towards achieving/maintaining therapeutic goals with special emphasis on  1. Improvement in perceived interfernce  of pain with ADL's. Ability to do home exercises independently. Ability to do household chores indoor and/or outdoor work and social and leisure activities. Improve psychosocial and physical functioning. - she is showing progression towards this treatment goal with the current regimen. She was advised against drinking alcohol with the narcotic pain medicines, advised against driving or handling machinery while adjusting the dose of medicines or if having cognitive  issues related to the current medications. Risk of overdose and death, if medicines not taken as prescribed, were also discussed. If the patient develops new symptoms or if the symptoms worsen, the patient should call the office. While transcribing every attempt was made to maintain the accuracy of the note in terms of it's contents,there may have been some errors made inadvertently. Thank you for allowing me to participate in the care of this patient.     Lana Clinton CNP    Cc: Ramy Mercedes MD

## 2019-05-03 ENCOUNTER — TELEPHONE (OUTPATIENT)
Dept: INTERNAL MEDICINE CLINIC | Age: 74
End: 2019-05-03

## 2019-05-15 ENCOUNTER — OFFICE VISIT (OUTPATIENT)
Dept: INFECTIOUS DISEASES | Age: 74
End: 2019-05-15
Payer: MEDICAID

## 2019-05-15 VITALS
SYSTOLIC BLOOD PRESSURE: 126 MMHG | RESPIRATION RATE: 16 BRPM | HEIGHT: 63 IN | DIASTOLIC BLOOD PRESSURE: 76 MMHG | HEART RATE: 74 BPM | WEIGHT: 246 LBS | BODY MASS INDEX: 43.59 KG/M2 | OXYGEN SATURATION: 97 %

## 2019-05-15 DIAGNOSIS — Z96.651 STATUS POST TOTAL RIGHT KNEE REPLACEMENT: ICD-10-CM

## 2019-05-15 DIAGNOSIS — T84.59XD INFECTION OF PROSTHETIC KNEE JOINT, SUBSEQUENT ENCOUNTER: Primary | ICD-10-CM

## 2019-05-15 DIAGNOSIS — Z96.659 INFECTED PROSTHETIC KNEE JOINT, SEQUELA: ICD-10-CM

## 2019-05-15 DIAGNOSIS — T84.59XS INFECTED PROSTHETIC KNEE JOINT, SEQUELA: ICD-10-CM

## 2019-05-15 DIAGNOSIS — N18.6 ESRD (END STAGE RENAL DISEASE) (HCC): ICD-10-CM

## 2019-05-15 DIAGNOSIS — Z96.659 INFECTION OF PROSTHETIC KNEE JOINT, SUBSEQUENT ENCOUNTER: Primary | ICD-10-CM

## 2019-05-15 PROCEDURE — 99213 OFFICE O/P EST LOW 20 MIN: CPT | Performed by: INTERNAL MEDICINE

## 2019-05-15 RX ORDER — DOXYCYCLINE HYCLATE 100 MG
TABLET ORAL
Qty: 180 TABLET | Refills: 2 | Status: SHIPPED | OUTPATIENT
Start: 2019-05-15 | End: 2019-10-02 | Stop reason: ALTCHOICE

## 2019-05-15 NOTE — PROGRESS NOTES
Infectious Diseases Outpatient Follow-up Note      Primary Care Physician:  Екатерина Barron MD       History Obtained From:   Patient, EPIC    CHIEF COMPLAINT / ID problem:    Chief Complaint   Patient presents with    6 Month Follow-Up     Infection of prosthetic knee joint, subsequent encounter       HISTORY OF PRESENT ILLNESS / Interval history:    Here for follow up on Rt TKA infection and on suppression with Doxycyline she had complicated course and several surgeries following knee replacement and she is currently on Doxycycline suppression she has ESRD on HD and OA affecting several joints she is thinking of getting shoulder surgery due to pain and understands her risk is higher than normal. She is tolerating the oral abx well.        Past Medical History:    Past Medical History:   Diagnosis Date    Acid reflux 11/18/2016    CAFL (chronic airflow limitation) ([facility] 11/18/2016    Depression     ESRD on dialysis ([facility] 09/08/2011    Femoral artery stenosis, right (HCC) 01/2016    severe, per angio (>90%)    Hyperlipidemia     Hypertension     Infected prosthetic knee joint ([facility] [address] [facility]     Right    Morbid obesity ([facility] 8/29/2008    MRSA nasal colonization 1/12/16    Peripheral vascular disease ([facility] [facility]     Right knee DJD 9/8/2011    S/P TKR (total knee replacement) 01/2016    bilateral       Past Surgical History:    Past Surgical History:   Procedure Laterality Date    CARDIAC CATHETERIZATION  04/28/2015    Dr. Mario Host Bilateral     DIALYSIS FISTULA CREATION Left     EPIDURAL STEROID INJECTION N/A 12/27/2018    MIDLINE LUMBAR FIVE/ SACRAL ONE INTERLAMINER EPIDURAL STEROID INJECTION SITE CONFIRMED BY FLUOROSCOPY performed by Adrian De MD at [facility] Right 01/20/2016    Dr. Tiffanie Lacey - resection arthroplasty and placement of antibiotic spacer     LYMPH NODE BIOPSY      OTHER SURGICAL HISTORY Right 01/12/2016    Dr. Douglas Starr for Rhinitis 1 Bottle 1    Blood Pressure Monitor MISC 1 Device by Does not apply route as needed (prn) 1 each 0    Blood Pressure Monitoring (5 SERIES BP MONITOR) RACHELL 1 Device by Does not apply route as needed (PRN) 1 Device 0    cloNIDine (CATAPRES) 0.2 MG tablet Take 0.2 mg by mouth 3 times daily      albuterol sulfate HFA (PROAIR HFA) 108 (90 Base) MCG/ACT inhaler Inhale 2 puffs into the lungs 4 times daily as needed for Wheezing 1 Inhaler 0    calcium acetate (PHOSLO) 667 MG capsule Take by mouth 3 times daily (with meals)      minoxidil (LONITEN) 2.5 MG tablet TAKE 1 TABLET BY MOUTH TWICE DAILY 60 tablet 5    amLODIPine (NORVASC) 10 MG tablet Take 10 mg by mouth daily       No current facility-administered medications for this visit. Allergies: Iv dye [iodides];  Lisinopril; Peanut oil; Hydralazine; and Lipitor [atorvastatin]      Immunizations :   Immunization History   Administered Date(s) Administered    Influenza Virus Vaccine 10/15/2011, 2018    Influenza Whole 2015    Pneumococcal 13-valent Conjugate ([id number]) 2019    Pneumococcal Polysaccharide ([id number]) 2014           Social History:    Social History     Socioeconomic History    Marital status: Single     Spouse name: None    Number of children: None    Years of education: None    Highest education level: None   Occupational History    None   Social Needs    Financial resource strain: None    Food insecurity:     Worry: None     Inability: None    Transportation needs:     Medical: None     Non-medical: None   Tobacco Use    Smoking status: Former Smoker     Packs/day: 0.00     Years: 54.00     Pack years: 0.00     Types: Cigarettes     Start date: 1962     Last attempt to quit: 3/20/2018     Years since quittin.1    Smokeless tobacco: Never Used   Substance and Sexual Activity    Alcohol use: No     Comment: rare    Drug use: No    Sexual activity: Not Currently   Lifestyle    [facility]   ORDER#: [id number]                          ORDERED BY: Nani Hoover  SOURCE: Knee Right, C                      COLLECTED:  06/20/16 12:35  ANTIBIOTICS AT [facility]. :                      RECEIVED :  06/20/16 13:23  (C)   Susceptibility     Staphylococcus epidermidis (1)     Antibiotic Interpretation ROWENA Status    ceFAZolin Resistant <=4 mcg/mL     ciprofloxacin Sensitive <=1 mcg/mL     clindamycin Sensitive <=0.5 mcg/mL     erythromycin Resistant >4 mcg/mL     oxacillin Resistant >2 mcg/mL     tetracycline Sensitive <=4 mcg/mL     trimethoprim-sulfamethoxazole Sensitive <=0.5 mcg/mL     vancomycin Sensitive 2 mcg/mL     Lab and Collection       Labs, Microbiology and  Radiology results pertinent to this visit and from care every where  reviewed in detail by me as part of the consultation     IMPRESSION:     Diagnosis Orders   1. Infection of prosthetic knee joint, subsequent encounter  SEDIMENTATION RATE    C-reactive protein   2. Status post total right knee replacement     3. ESRD (end stage renal disease)     4. Infected prosthetic knee joint, sequela       She has been on oral suppression with Doxycycline and her knee is doing better no flare ups and ESR, CRP trend down and she is on HD for ESRD no recent infections. She will remain on oral suppression for now as long as she tolerates it well/      PLAN:    1. Cont oral Doxycyline x 100 mg bid   2. ESR, CRP   3. Side effects d/w pt   4. Follow up x 5 months      More than 50% of Face-to- Face time was spent in counseling and/or coordination of care including  reviewing data, discussing multiple problems and medications, formulating a plan. D/w Patient in detail at  the time of consultation. Thanks for allowing me to participate in your patient's care and please do not hesitate to  call me with any questions or concerns.     Amanda Post MD  Infectious Disease  [facility]) Physician  Phone: [phone]   Fax : [phone]

## 2019-05-21 ENCOUNTER — TELEPHONE (OUTPATIENT)
Dept: PAIN MANAGEMENT | Age: 74
End: 2019-05-21

## 2019-05-21 NOTE — TELEPHONE ENCOUNTER
She is calling to let us know she will not be able to make her appointment today. She states she will not make it in because her transportation took her to , then back home when that wasn't right. They wouldn't bring her here. If this will be an excused absence, please cancel her appointment for today. Thank you.

## 2019-05-22 ENCOUNTER — OFFICE VISIT (OUTPATIENT)
Dept: ORTHOPEDIC SURGERY | Age: 74
End: 2019-05-22
Payer: MEDICAID

## 2019-05-22 VITALS
HEART RATE: 67 BPM | HEIGHT: 63 IN | RESPIRATION RATE: 16 BRPM | TEMPERATURE: 97.5 F | SYSTOLIC BLOOD PRESSURE: 159 MMHG | DIASTOLIC BLOOD PRESSURE: 73 MMHG | WEIGHT: 246 LBS | BODY MASS INDEX: 43.59 KG/M2

## 2019-05-22 DIAGNOSIS — M19.011 BILATERAL SHOULDER REGION ARTHRITIS: Primary | ICD-10-CM

## 2019-05-22 DIAGNOSIS — T84.59XD INFECTION OF PROSTHETIC KNEE JOINT, SUBSEQUENT ENCOUNTER: ICD-10-CM

## 2019-05-22 DIAGNOSIS — Z96.659 INFECTION OF PROSTHETIC KNEE JOINT, SUBSEQUENT ENCOUNTER: ICD-10-CM

## 2019-05-22 DIAGNOSIS — M19.012 BILATERAL SHOULDER REGION ARTHRITIS: Primary | ICD-10-CM

## 2019-05-22 PROCEDURE — 99212 OFFICE O/P EST SF 10 MIN: CPT | Performed by: PHYSICIAN ASSISTANT

## 2019-05-22 NOTE — PROGRESS NOTES
Subjective:      Patient ID: Alvina Marinelli is a 66 y.o.  female. Chief Complaint   Patient presents with    Follow-up     Bilateral shoulder/ last injection 1.29.19        HPI: She is here for follow-up on her bilateral shoulder pain. Last injection 1/29/2019 with minimal improvement. She has mild discomfort at rest.  She has significant discomfort with any use of the arms especially overhead. She denies any significant neck pain, numbness or tingling in the upper extremities. She is a poor surgical candidate due to ongoing MRSA infection status post knee arthroplasty. Also dialysis patient. Review of Systems:   Negative for fever or chills.       Past Medical History:   Diagnosis Date    Acid reflux 11/18/2016    CAFL (chronic airflow limitation) ([facility] 11/18/2016    Depression     ESRD on dialysis ([facility] [facility] 09/08/2011    Femoral artery stenosis, right ([address] 01/2016    severe, per angio (>90%)    Hyperlipidemia     Hypertension     Infected prosthetic knee joint ([facility] [address]     Right    Morbid obesity ([facility] [address] 8/29/2008    MRSA nasal colonization 1/12/16    Peripheral vascular disease ([facility] [address] [facility]     Right knee DJD 9/8/2011    S/P TKR (total knee replacement) 01/2016    bilateral       Family History   Problem Relation Age of Onset    Stroke Mother     Hypertension Mother     Diabetes Father     Diabetes Sister     Osteoarthritis Maternal Grandmother        Past Surgical History:   Procedure Laterality Date    CARDIAC CATHETERIZATION  04/28/2015    Dr. Maryam Rowland Bilateral     DIALYSIS FISTULA CREATION Left     EPIDURAL STEROID INJECTION N/A 12/27/2018    MIDLINE LUMBAR FIVE/ SACRAL ONE INTERLAMINER EPIDURAL STEROID INJECTION SITE CONFIRMED BY FLUOROSCOPY performed by Leon Nation MD at [address] [place] [address] Right 01/20/2016    Dr. Villarreal Marking - resection arthroplasty and placement of antibiotic spacer     LYMPH NODE BIOPSY      OTHER SURGICAL HISTORY Right 2016    Dr. Iker Wan - RLE angiogram prior to revision knee arthroplasty    REVISION TOTAL KNEE ARTHROPLASTY Right 2016    Dr. Eric Mark - I&D w/repeat Prostalac spacer implant     REVISION TOTAL KNEE ARTHROPLASTY Right 11/15/2016    Dr. Eric Mark - staged revision due to infection    TOTAL [place] [facility] Right     Dr. Seb Saleh Left     Dr. Hang Garcia Not on file   Tobacco Use    Smoking status: Former Smoker     Packs/day: 0.00     Years: 54.00     Pack years: 0.00     Types: Cigarettes     Start date: 1962     Last attempt to quit: 3/20/2018     Years since quittin.1    Smokeless tobacco: Never Used   Substance and Sexual Activity    Alcohol use: No     Comment: rare    Drug use: No    Sexual activity: Not Currently       Current Outpatient Medications   Medication Sig Dispense Refill    doxycycline hyclate (VIBRA-TABS) 100 MG tablet TAKE 1 TABLET BY MOUTH TWICE DAILY 180 tablet 2    DULoxetine (CYMBALTA) 30 MG extended release capsule Take 1 capsule by mouth daily 30 capsule 1    diclofenac sodium (VOLTAREN) 1 % GEL Apply 2-4 g topically 2 times daily 5 Tube 0    HYDROcodone-acetaminophen (NORCO) 7.5-325 MG per tablet Take 1 tablet by mouth 2 times daily as needed for Pain for up to 30 days.  60 tablet 0    nortriptyline (PAMELOR) 10 MG capsule Take 1 capsule by mouth nightly 30 capsule 3    benzonatate (TESSALON) 100 MG capsule Take 1 capsule by mouth 2 times daily as needed for Cough 30 capsule 0    ranitidine (ZANTAC) 150 MG tablet Take 1 tablet by mouth 2 times daily 30 tablet 3    traZODone (DESYREL) 50 MG tablet Take 1 tablet by mouth nightly as needed for Sleep 30 tablet 1    pravastatin (PRAVACHOL) 40 MG tablet TAKE 1 TABLET BY MOUTH DAILY 30 tablet 3    pravastatin (PRAVACHOL) 40 MG tablet TAKE 1 TABLET BY MOUTH DAILY 30 tablet 0    alendronate (FOSAMAX) 70 MG tablet Take 1 tablet by mouth every 7 days 4 tablet 3    pravastatin (PRAVACHOL) 40 MG tablet Take 1 tablet by mouth daily 30 tablet 3    Cholecalciferol (VITAMIN D3) 1000 units CAPS Take 1 capsule by mouth daily 30 capsule 2    ASPIRIN LOW DOSE 81 MG EC tablet TAKE 1 TABLET BY MOUTH DAILY 30 tablet 5    clopidogrel (PLAVIX) 75 MG tablet Take 1 tablet by mouth daily 30 tablet 3    fluticasone (FLONASE) 50 MCG/ACT nasal spray 2 sprays by Nasal route daily as needed for Rhinitis 1 Bottle 1    Blood Pressure Monitor MISC 1 Device by Does not apply route as needed (prn) 1 each 0    Blood Pressure Monitoring (5 SERIES BP MONITOR) RACHELL 1 Device by Does not apply route as needed (PRN) 1 Device 0    cloNIDine (CATAPRES) 0.2 MG tablet Take 0.2 mg by mouth 3 times daily      albuterol sulfate HFA (PROAIR HFA) 108 (90 Base) MCG/ACT inhaler Inhale 2 puffs into the lungs 4 times daily as needed for Wheezing 1 Inhaler 0    calcium acetate (PHOSLO) 667 MG capsule Take by mouth 3 times daily (with meals)      minoxidil (LONITEN) 2.5 MG tablet TAKE 1 TABLET BY MOUTH TWICE DAILY 60 tablet 5    amLODIPine (NORVASC) 10 MG tablet Take 10 mg by mouth daily       No current facility-administered medications for this visit. Objective:   She is alert, oriented x 3, pleasant, well nourished, developed and in no acute distress. BP (!) 159/73   Pulse 67   Temp 97.5 °F (36.4 °C) (Tympanic)   Resp 16   Ht 5' 3\" (1.6 m)   Wt 246 lb (111.6 kg)   LMP 01/21/1964   BMI 43.58 kg/m²      Examination of the left and right shoulder shows: There is no deformity. There is no erythema. There is no  soft tissue swelling. Deltoid region is  tender to palpation. AC Joint is  tender to palpation. Clavicle is not tender to palpation. Bicipital Groove is not  tender to palpation. Pectoralis  is not tender to palpation. Scapula/ trapezius is not tender to palpation. There is moderate weakness with supraspinatus testing.   There is marked pain with supraspinatus testing. X Rays: not performed in the office today:     Diagnosis:        ICD-10-CM    1. Bilateral shoulder region arthritis M19.011     M19.012         Assessment/ Plan:      Persistent bilateral shoulder pain due to rotator cuff arthropathy. The natural history of the patient's diagnosis as well as the treatment options were discussed in full and questions were answered. Risks and benefits of the treatment options also reviewed in detail. Again she is a poor surgical candidate due to ongoing MRSA infection status post knee arthroplasty. Also a renal dialysis patient. Recommend pain management. She is interested in obtaining her medical marijuana card. She will need to go outside at [place] [address] in order to obtain her medical card for marijuana use. Follow Up:   Call or return to clinic prn if these symptoms worsen or fail to improve as anticipated.

## 2019-05-28 ENCOUNTER — OFFICE VISIT (OUTPATIENT)
Dept: PAIN MANAGEMENT | Age: 74
End: 2019-05-28
Payer: MEDICAID

## 2019-05-28 VITALS — SYSTOLIC BLOOD PRESSURE: 151 MMHG | OXYGEN SATURATION: 97 % | HEART RATE: 75 BPM | DIASTOLIC BLOOD PRESSURE: 64 MMHG

## 2019-05-28 DIAGNOSIS — M17.11 OSTEOARTHRITIS OF RIGHT KNEE, UNSPECIFIED OSTEOARTHRITIS TYPE: ICD-10-CM

## 2019-05-28 DIAGNOSIS — E66.01 MORBID OBESITY (HCC): ICD-10-CM

## 2019-05-28 DIAGNOSIS — M48.061 SPINAL STENOSIS OF LUMBAR REGION, UNSPECIFIED WHETHER NEUROGENIC CLAUDICATION PRESENT: ICD-10-CM

## 2019-05-28 DIAGNOSIS — M51.37 DDD (DEGENERATIVE DISC DISEASE), LUMBOSACRAL: ICD-10-CM

## 2019-05-28 DIAGNOSIS — M19.011 BILATERAL SHOULDER REGION ARTHRITIS: ICD-10-CM

## 2019-05-28 DIAGNOSIS — G89.4 CHRONIC PAIN SYNDROME: ICD-10-CM

## 2019-05-28 DIAGNOSIS — F51.01 PRIMARY INSOMNIA: ICD-10-CM

## 2019-05-28 DIAGNOSIS — M47.817 FACET ARTHROPATHY, LUMBOSACRAL: ICD-10-CM

## 2019-05-28 DIAGNOSIS — M19.012 BILATERAL SHOULDER REGION ARTHRITIS: ICD-10-CM

## 2019-05-28 DIAGNOSIS — E55.9 VITAMIN D DEFICIENCY: ICD-10-CM

## 2019-05-28 DIAGNOSIS — M43.16 SPONDYLOLISTHESIS AT L4-L5 LEVEL: ICD-10-CM

## 2019-05-28 PROCEDURE — 99213 OFFICE O/P EST LOW 20 MIN: CPT | Performed by: NURSE PRACTITIONER

## 2019-05-28 RX ORDER — HYDROCODONE BITARTRATE AND ACETAMINOPHEN 7.5; 325 MG/1; MG/1
1 TABLET ORAL 2 TIMES DAILY PRN
Qty: 60 TABLET | Refills: 0 | Status: SHIPPED | OUTPATIENT
Start: 2019-05-28 | End: 2019-06-26 | Stop reason: SDUPTHER

## 2019-05-28 RX ORDER — DULOXETIN HYDROCHLORIDE 30 MG/1
30 CAPSULE, DELAYED RELEASE ORAL DAILY
Qty: 30 CAPSULE | Refills: 1 | Status: SHIPPED | OUTPATIENT
Start: 2019-05-28 | End: 2019-06-06

## 2019-05-28 NOTE — PROGRESS NOTES
Arnoldo Ha  1945  [id number]      HISTORY OF PRESENT ILLNESS:  Ms. Amadou Sewell is a 66 y.o. female returns for a follow up visit for pain management  She has a diagnosis of   1. Chronic pain syndrome    2. DDD (degenerative disc disease), lumbosacral    3. Facet arthropathy, lumbosacral    4. Spondylolisthesis at L4-L5 level    5. Spinal stenosis of lumbar region, unspecified whether neurogenic claudication present    6. Bilateral shoulder region arthritis    7. Osteoarthritis of right knee, unspecified osteoarthritis type    8. Morbid obesity ([address]    9. Primary insomnia      On the Patients Pain Assessment form:  She complains of pain in the right arm(s): entire limb, left knee(s) and right shoulder(s): entire area She rates the pain 9/10 and describes it as sharp, aching. Current treatment regimen has helped relieve about 30% of the pain. She denies any side effects from the current pain regimen. Patient reports that since the last follow up visit the physical functioning is worse, family/social relationships are unchanged, mood is unchanged sleep patterns are worse, and that the overall functioning is worse. Patient denies misusing/abusing her narcotic pain medications or using any illegal drugs. There are No indicators for possible drug abuse, addiction or diversion problems. Upon obtaining medical history from Ms. Amadou Sewell states that pain is manageable on current pain therapy. She missed her last appointment, and has been out of pain medications for a few days. Compliant with dialysis 3 times a week. Mood is stable without anxiety. Sleep is fair with an average of 5-6 hours. Denies to having issues of constipation. Tolerating activities/house chores with moderate tenderness to the lower back. ALLERGIES: Patients list of allergies were reviewed     MEDICATIONS: Ms. Amadou Sewell list of medications were reviewed. Her current medications are   Outpatient Medications Prior to Visit   Medication Sig Dispense Refill    doxycycline hyclate (VIBRA-TABS) 100 MG tablet TAKE 1 TABLET BY MOUTH TWICE DAILY 180 tablet 2    diclofenac sodium (VOLTAREN) 1 % GEL Apply 2-4 g topically 2 times daily 5 Tube 0    benzonatate (TESSALON) 100 MG capsule Take 1 capsule by mouth 2 times daily as needed for Cough 30 capsule 0    ranitidine (ZANTAC) 150 MG tablet Take 1 tablet by mouth 2 times daily 30 tablet 3    traZODone (DESYREL) 50 MG tablet Take 1 tablet by mouth nightly as needed for Sleep 30 tablet 1    pravastatin (PRAVACHOL) 40 MG tablet TAKE 1 TABLET BY MOUTH DAILY 30 tablet 3    pravastatin (PRAVACHOL) 40 MG tablet TAKE 1 TABLET BY MOUTH DAILY 30 tablet 0    alendronate (FOSAMAX) 70 MG tablet Take 1 tablet by mouth every 7 days 4 tablet 3    pravastatin (PRAVACHOL) 40 MG tablet Take 1 tablet by mouth daily 30 tablet 3    Cholecalciferol (VITAMIN D3) 1000 units CAPS Take 1 capsule by mouth daily 30 capsule 2    ASPIRIN LOW DOSE 81 MG EC tablet TAKE 1 TABLET BY MOUTH DAILY 30 tablet 5    clopidogrel (PLAVIX) 75 MG tablet Take 1 tablet by mouth daily 30 tablet 3    fluticasone (FLONASE) 50 MCG/ACT nasal spray 2 sprays by Nasal route daily as needed for Rhinitis 1 Bottle 1    Blood Pressure Monitor MISC 1 Device by Does not apply route as needed (prn) 1 each 0    Blood Pressure Monitoring (5 SERIES BP MONITOR) RACHELL 1 Device by Does not apply route as needed (PRN) 1 Device 0    cloNIDine (CATAPRES) 0.2 MG tablet Take 0.2 mg by mouth 3 times daily      albuterol sulfate HFA (PROAIR HFA) 108 (90 Base) MCG/ACT inhaler Inhale 2 puffs into the lungs 4 times daily as needed for Wheezing 1 Inhaler 0    calcium acetate (PHOSLO) 667 MG capsule Take by mouth 3 times daily (with meals)      minoxidil (LONITEN) 2.5 MG tablet TAKE 1 TABLET BY MOUTH TWICE DAILY 60 tablet 5    amLODIPine (NORVASC) 10 MG tablet Take 10 mg by mouth daily      DULoxetine (CYMBALTA) 30 MG extended release capsule Take 1 capsule by mouth daily 30 capsule 1    nortriptyline (PAMELOR) 10 MG capsule Take 1 capsule by mouth nightly 30 capsule 3     No facility-administered medications prior to visit. SOCIAL/FAMILY/PAST MEDICAL HISTORY: Ms. Polanco Im, family and past medical history was reviewed. REVIEW OF SYSTEMS:    Respiratory: Negative for apnea, chest tightness and shortness of breath or change in baseline breathing. Gastrointestinal: Negative for nausea, vomiting, abdominal pain, diarrhea, constipation, blood in stool and abdominal distention. PHYSICAL EXAM:   Nursing note and vitals reviewed. BP (!) 151/64   Pulse 75   LMP 01/21/1964   SpO2 97%   Constitutional: She appears well-developed and well-nourished. No acute distress. Skin: Skin is warm and dry, good turgor. No rash noted. She is not diaphoretic. Cardiovascular: Normal rate, regular rhythm, normal heart sounds, and does not have murmur. Pulmonary/Chest: Effort normal. No respiratory distress. She does not have wheezes in the lung fields. She has no rales. Neurological/Psychiatric:She is alert and oriented to person, place, and time. Coordination is  normal.  Her mood isAppropriate and affect is Neutral/Euthymic(normal) . IMPRESSION:   1. Chronic pain syndrome    2. DDD (degenerative disc disease), lumbosacral    3. Facet arthropathy, lumbosacral    4. Spondylolisthesis at L4-L5 level    5. Spinal stenosis of lumbar region, unspecified whether neurogenic claudication present    6. Bilateral shoulder region arthritis    7. Osteoarthritis of right knee, unspecified osteoarthritis type    8. Morbid obesity ([address]    9.  Primary insomnia        PLAN:  Informed verbal consent was obtained  -Continue with Norco, Cymbalta, V.gel  -D/c Pamelor, currently on trazodone  -Jeimy exercises  -medical marijuana  -Coaching completed on failed UDS negative for [place]. Reports taking medications as prescribed, unsure whether the oral swab was not sufficient for testing. She will be given another chance on opioids, UDS will be completed on f/u visit. Strongly advised the patient to take medications as prescribed, report to me if she has concerns with medications. She verbalized understanding  -Monitor BP, f/u with PCP if it continues to stay elevated or she becomes symptomatic with SOB, CP, syncopy. she is currently asymptomatic  -Maintain f/u with orthopedics for OA knees, Last ILIANA of the lumbar spine with Dr. Betsy Landa was 5 month ago, provided good relief pf symptoms  -She was advised weight reduction, diet changes- 800-1200 sierra diet, diet diary, exercising, nutritional  consult increased physical activity as tolerated  -CBT techniques- relaxation therapies such as biofeedback, mindfulness based stress reduction, imagery, cognitive restructuring, problem solving discussed with patient  -Last UDS inconsistent/UDS on f/u visit  -Return in about 1 month (around 6/25/2019). Current Outpatient Medications   Medication Sig Dispense Refill    DULoxetine (CYMBALTA) 30 MG extended release capsule Take 1 capsule by mouth daily 30 capsule 1    HYDROcodone-acetaminophen (NORCO) 7.5-325 MG per tablet Take 1 tablet by mouth 2 times daily as needed for Pain for up to 30 days.  60 tablet 0    doxycycline hyclate (VIBRA-TABS) 100 MG tablet TAKE 1 TABLET BY MOUTH TWICE DAILY 180 tablet 2    diclofenac sodium (VOLTAREN) 1 % GEL Apply 2-4 g topically 2 times daily 5 Tube 0    benzonatate (TESSALON) 100 MG capsule Take 1 capsule by mouth 2 times daily as needed for Cough 30 capsule 0    ranitidine (ZANTAC) 150 MG tablet Take 1 tablet by mouth 2 times daily 30 tablet 3    traZODone (DESYREL) 50 MG tablet Take 1 tablet by mouth nightly as needed for Sleep 30 tablet 1    pravastatin (PRAVACHOL) 40 MG tablet TAKE 1 TABLET BY MOUTH DAILY 30 tablet 3    pravastatin (PRAVACHOL) 40 MG tablet TAKE 1 TABLET BY MOUTH DAILY 30 tablet 0    alendronate (FOSAMAX) 70 MG tablet Take 1 tablet by mouth

## 2019-06-06 ENCOUNTER — OFFICE VISIT (OUTPATIENT)
Dept: CARDIOLOGY CLINIC | Age: 74
End: 2019-06-06
Payer: MEDICAID

## 2019-06-06 VITALS — HEART RATE: 70 BPM | SYSTOLIC BLOOD PRESSURE: 130 MMHG | DIASTOLIC BLOOD PRESSURE: 55 MMHG

## 2019-06-06 DIAGNOSIS — I20.9 ANGINA PECTORIS (HCC): Primary | ICD-10-CM

## 2019-06-06 PROCEDURE — 99214 OFFICE O/P EST MOD 30 MIN: CPT | Performed by: INTERNAL MEDICINE

## 2019-06-06 RX ORDER — OMEPRAZOLE 20 MG/1
20 CAPSULE, DELAYED RELEASE ORAL DAILY
Qty: 30 CAPSULE | Refills: 3 | Status: SHIPPED | OUTPATIENT
Start: 2019-06-06 | End: 2019-06-11 | Stop reason: DRUGHIGH

## 2019-06-06 NOTE — PROGRESS NOTES
[facility], [place], operated by [facility]   Dr Maicol Gleason. Yolanda Eisenberg MD, [address] [facility]                                                                   Outpatient Follow Up Note              06/06/19  HPI:  Paulina Gilbert is 66 y.o. female who presents today with hx ESRD/ HD Caroid disease, CM ZULEMA  DDD chronic pain, obesity  . This patient does have carotid artery disease significantly more and worse on the right than left. This patient did not have surgery on her carotids. She does not have any new neurological symptoms but is still concerned about it. Check her lipids in September 2018 and her LDL was 80 and she takes Pravachol currently 40 mg. She did not tolerate the Lipitor. She has no chest discomfort or pain. She is in chronic renal failure and on hemodialysis with a shunt in her left upper arm. She has arthritis in both knees and wishes to have surgery. Arthritis and a white shoulder and wishes to have surgery but no one wants to operate. From a cardiac perspective I think she is doing well. She does have what sounds like heartburn or reflux on occasion. I have asked her to start on Prilosec at 20 mg per day. And she is to eliminate any late evening meals and to see her gastroenterologist.  We will get a stress nuclear study. Chronic anemia related to the kidney failure. Bilateral carotid disease  Hypertension  Hyperlipidemia on therapy  Chronic renal failure on hemodialysis.    Past Medical History:   Diagnosis Date    Acid reflux 11/18/2016    CAFL (chronic airflow limitation) ([address] 11/18/2016    Depression     ESRD on dialysis ([address] 09/08/2011    Femoral artery stenosis, right ([address] 01/2016    severe, per angio (>90%)    Hyperlipidemia     Hypertension     Infected prosthetic knee joint ([address]     Right    Morbid obesity ([address] 8/29/2008    MRSA nasal colonization 1/12/16    Peripheral vascular disease ([address]     Right knee DJD 9/8/2011    S/P TKR (total knee nightly as needed for Sleep Yes Phuong Lind MD   alendronate (FOSAMAX) 70 MG tablet Take 1 tablet by mouth every 7 days Yes Phuong Lind MD   pravastatin (PRAVACHOL) 40 MG tablet Take 1 tablet by mouth daily Yes Phuong Lind MD   ASPIRIN LOW DOSE 81 MG EC tablet TAKE 1 TABLET BY MOUTH DAILY Yes TORSTEN Manley CNP   clopidogrel (PLAVIX) 75 MG tablet Take 1 tablet by mouth daily Yes TORSTEN Manley CNP   fluticasone (FLONASE) 50 MCG/ACT nasal spray 2 sprays by Nasal route daily as needed for Rhinitis Yes Phuong Lind MD   Blood Pressure Monitor MISC 1 Device by Does not apply route as needed (prn) Yes Phuong Lind MD   Blood Pressure Monitoring (5 SERIES BP MONITOR) RACHELL 1 Device by Does not apply route as needed (PRN) Yes Phuong Lind MD   cloNIDine (CATAPRES) 0.2 MG tablet Take 0.2 mg by mouth 3 times daily Yes Historical Provider, MD   calcium acetate (PHOSLO) 667 MG capsule Take by mouth 3 times daily (with meals) Yes Historical Provider, MD   minoxidil (LONITEN) 2.5 MG tablet TAKE 1 TABLET BY MOUTH TWICE DAILY Yes Diana Alvarado MD   amLODIPine (NORVASC) 10 MG tablet Take 10 mg by mouth daily Yes Historical Provider, MD   albuterol sulfate HFA (PROAIR HFA) 108 (90 Base) MCG/ACT inhaler Inhale 2 puffs into the lungs 4 times daily as needed for Wheezing  Phuong Lind MD     Family History  Family History   Problem Relation Age of Onset    Stroke Mother     Hypertension Mother     Diabetes Father     Diabetes Sister     Osteoarthritis Maternal Grandmother           · ROS:   · Cardiovascular: Reviewed in HPI  · Allergic/Immunologic: No nasal congestion or hives. · All other ROS are reviewed and are unremarkable.     PHYSICAL EXAM:      Wt Readings from Last 3 Encounters:   05/22/19 246 lb (111.6 kg)   05/15/19 246 lb (111.6 kg)   04/04/19 246 lb (111.6 kg)       BP Readings from Last 3 Encounters:   06/06/19 (!) 130/55   05/28/19 (!) 151/64   05/22/19 (!) 159/73       Pulse Readings from Last 3 Encounters:   06/06/19 70   05/28/19 75   05/22/19 67     Exam   ENT: neg   NEUROLOGIC:  Neg   PSYCH: neg  SKIN: Warm and dry. LUNGS:  No increased work of breathing and clear to auscultation, no crackles or wheezing  CARDIOVASCULAR: RRR  ABDOMEN: soft large   EXT:<1+     Assessment:   Carotid stenosis   Us carotid   us 2/20/18 similar to last us of carotid on 11/8/16   The right internal carotid artery appears to have a 50-79% diameter reducing   stenosis based on velocity criteria. The right vertebral artery demonstrates normal antegrade flow. Left Impression   The left internal carotid artery appears to have a 50-79% diameter reducing   stenosis based on velocity criteria. The left vertebral artery demonstrates normal antegrade flow. CT of neck/brain 2/28/18   CTA of the head:    1.  Diminutive right A1 segment without focal occlusion. Asymmetrically narrow caliber left A2    segment. Otherwise the intracranial vessels are patent without stenosis or occlusion. 2.  Collateral vessels along the anterior chest wall suggest venous occlusion. CTA of the neck:    1.  Extensive atherosclerotic calcification of the neck vessels with 88 % focal stenosis at the    origin of the right internal carotid artery and 34 % stenosis at the origin of the left    internal carotid artery. 2.  Calcified plaques within bilateral vertebral arteries without flow-limiting stenosis or    occlusion. Summary2/20/19        RIGHT CAROTID SYSTEM    There is atherosclerotic plaque at the distal common carotid artery and it    extends into the internal and external carotid arteries, both of which have    hemodynamic stenosis.  ICA velocities in 50-79% stenosis with ICA/CCA ratio    placing it into the >70% stenosis        LEFT CAROTID SYSTEM.   Shagufta Milvia is moderate atherosclerotic type changes at the carotid bifurcation.    Velocity consistent with 50-79% stenosis in the Internal carotid artery, but    visually the stenosis is on the low side of the stenosis category        Normal vertebral artery blood flow bilaterally       ESRD  HD for 10 years     HFpEF / appears compensated      To review we did perform cardiac cath on her in 2015 and she had mild right coronary ostial disease that did not require intervention and the remainder of her coronary anatomy was stable. Echo 2/18  Left ventricle size is normal.   moderate left ventricular hypertrophy is present.   Global left ventricular function is increased (hyperdynamic) with ejection   fraction estimated 70 %. Diastolic dysfunction.   The aortic valve is structurally normal.   Accelerated flow across the aortic valve and LVOT.   No significant hemodynamic stenosis.   The right ventricle is not well visualized.   Normal RVEF   mitral annular calcification    Neg stress test 9/19/17   EKG on 10/25/18 sinus rhythm 73/m. Left axis deviation. Nonspecific ST and T wave changes. HTN   On Norvasc catapres   b/p low today asymptomatic     ZULEMA? Sleep clinic referral     HLD    On pravachol. Did not tolerate lipitor     Plan:  Patient education on touch screen in room   Discussed heart heathy lifestyle including nutrition, exercise & importance of nonsmoking,         Lexiscan Myoview to evaluate some of his heartburn. Prilosec 20 mg per day. Avoid evening meals only meals. See her gastroenterologist.  Return to see me in 4 months. Michelle Rivera M.D.  [facility]

## 2019-06-06 NOTE — PROGRESS NOTES
Pt states that she has heartburn frequently and she tends to Vomits quite often. Coughing comes with the vomiting. The heartburn leaves a nasty taste in her mouth she states and she worries that it may be cardiac related.  Curious to know what her current LDL is

## 2019-06-11 ENCOUNTER — OFFICE VISIT (OUTPATIENT)
Dept: INTERNAL MEDICINE CLINIC | Age: 74
End: 2019-06-11
Payer: MEDICAID

## 2019-06-11 VITALS
HEART RATE: 64 BPM | HEIGHT: 63 IN | WEIGHT: 250 LBS | BODY MASS INDEX: 44.3 KG/M2 | OXYGEN SATURATION: 98 % | SYSTOLIC BLOOD PRESSURE: 130 MMHG | DIASTOLIC BLOOD PRESSURE: 78 MMHG

## 2019-06-11 DIAGNOSIS — G47.09 OTHER INSOMNIA: ICD-10-CM

## 2019-06-11 DIAGNOSIS — J30.9 ALLERGIC SINUSITIS: ICD-10-CM

## 2019-06-11 DIAGNOSIS — I10 ESSENTIAL HYPERTENSION: ICD-10-CM

## 2019-06-11 DIAGNOSIS — N18.6 ESRD (END STAGE RENAL DISEASE) (HCC): ICD-10-CM

## 2019-06-11 DIAGNOSIS — E78.5 HYPERLIPIDEMIA LDL GOAL <100: Primary | ICD-10-CM

## 2019-06-11 DIAGNOSIS — R26.9 GAIT DIFFICULTY: ICD-10-CM

## 2019-06-11 DIAGNOSIS — M81.8 OTHER OSTEOPOROSIS WITHOUT CURRENT PATHOLOGICAL FRACTURE: ICD-10-CM

## 2019-06-11 DIAGNOSIS — K21.9 GASTROESOPHAGEAL REFLUX DISEASE, ESOPHAGITIS PRESENCE NOT SPECIFIED: ICD-10-CM

## 2019-06-11 DIAGNOSIS — E55.9 VITAMIN D DEFICIENCY: ICD-10-CM

## 2019-06-11 DIAGNOSIS — R91.1 PULMONARY NODULE: ICD-10-CM

## 2019-06-11 DIAGNOSIS — F32.89 OTHER DEPRESSION: ICD-10-CM

## 2019-06-11 PROCEDURE — 99215 OFFICE O/P EST HI 40 MIN: CPT | Performed by: INTERNAL MEDICINE

## 2019-06-11 RX ORDER — ALENDRONATE SODIUM 70 MG/1
70 TABLET ORAL
Qty: 4 TABLET | Refills: 3 | Status: SHIPPED | OUTPATIENT
Start: 2019-06-11 | End: 2019-07-23 | Stop reason: SDUPTHER

## 2019-06-11 RX ORDER — PRAVASTATIN SODIUM 40 MG
40 TABLET ORAL DAILY
Qty: 30 TABLET | Refills: 3 | Status: SHIPPED | OUTPATIENT
Start: 2019-06-11 | End: 2019-07-23 | Stop reason: SDUPTHER

## 2019-06-11 RX ORDER — TRAZODONE HYDROCHLORIDE 100 MG/1
100 TABLET ORAL NIGHTLY PRN
Qty: 30 TABLET | Refills: 3 | Status: SHIPPED | OUTPATIENT
Start: 2019-06-11 | End: 2019-10-02 | Stop reason: SDUPTHER

## 2019-06-11 RX ORDER — PANTOPRAZOLE SODIUM 40 MG/1
40 TABLET, DELAYED RELEASE ORAL
Qty: 30 TABLET | Refills: 3 | Status: SHIPPED | OUTPATIENT
Start: 2019-06-11 | End: 2019-10-02 | Stop reason: SDUPTHER

## 2019-06-11 NOTE — PROGRESS NOTES
SUBJECTIVE:  Weston Hurst is a 66 y.o. female [address] Complaint   Patient presents with    Other     F/u HTN, pt requesting a script for a new motor wheelchair        PT HERE FOR EVAL    HLP -  TAKING PRAVACHOL. EXERCISE / DIET COMPLIANCE . NO MUSCLE ACHES. LABS D/W PT  HTN -  ON MEDS . NO HEADACHE , DIZZINESS No.   HEARTBURN - INTERMITTENT. TAKING MED - NOT HELPING MUCH. ? NO BELCHING  VIT D DEF -  TAKING MED.  LABS D/W PT. Dewane Newness DEPRESSION - PERSISTENT. STRESS - S/P BEREAVEMENTS. + INSOMNIA. DENIES SUICIDAL / NO HOMICIDAL THOUGHTS / IDEATION   INSOMNIA - TAKING MED - NOT HELPING MUCH  PULM. NODULE - PREVIOUS CT - D/W PT  OSTEOPOROSIS - TAKING MED. PREVIOUS DEXA SCAN D/W PT  ESRD - FOLLOWING WITH NEPHROLOGY. ON DIALYSIS- TOLERATING. PROBLEM WITH GAIT - STATES RELATED TO JOINT / KNEE ISSUES. PT REQUESTING SCOOTER,   ALLERGIC SINUSITIS - NO NASAL CONGESTION, NO POSTNASAL DRAINAGE , NO SINUS PRESSURE, NO HA, + SNEEZING, + WATERY ITCHY EYES. DENIES CP, No SOB, No PALPITATIONS, OCC COUGH - NON PRODUCTIVE  No ABD PAIN, OCC N/V - @ DIALYSIS, No DIARRHEA, No CONSTIPATION, No MELENA, No HEMATOCHEZIA. No DYSURIA, No FREQ, No URGENCY, No HEMATURIA - MAKES LITTLE URINE    PMH: REVIEWED AND UPDATED TODAY    PSH: REVIEWED AND UPDATED TODAY    SOCIAL HX: REVIEWED AND UPDATED TODAY    FAMILY HX: REVIEWED AND UPDATED TODAY    ALLERGY:  Iv dye [iodides]; Lisinopril; Peanut oil; Hydralazine; and Lipitor [atorvastatin]    MEDS: REVIEWED  Prior to Visit Medications    Medication Sig Taking? Authorizing Provider   omeprazole (PRILOSEC) 20 MG delayed release capsule Take 1 capsule by mouth Daily Yes Farhat Diallo MD   Cholecalciferol (VITAMIN D3) 1000 units CAPS TAKE 1 CAPSULE BY MOUTH DAILY Yes Esteban Reeder MD   HYDROcodone-acetaminophen (NORCO) 7.5-325 MG per tablet Take 1 tablet by mouth 2 times daily as needed for Pain for up to 30 days.  Yes TORSTEN Forman - CNP   doxycycline hyclate (VIBRA-TABS) 100 MG tablet TAKE 1 TABLET BY MOUTH TWICE DAILY Yes Bertha Mirza MD   benzonatate (TESSALON) 100 MG capsule Take 1 capsule by mouth 2 times daily as needed for Cough Yes Jesu Dutta MD   traZODone (DESYREL) 50 MG tablet Take 1 tablet by mouth nightly as needed for Sleep Yes Jesu Dutta MD   alendronate (FOSAMAX) 70 MG tablet Take 1 tablet by mouth every 7 days Yes Jesu Dutta MD   pravastatin (PRAVACHOL) 40 MG tablet Take 1 tablet by mouth daily Yes Jesu Dutta MD   ASPIRIN LOW DOSE 81 MG EC tablet TAKE 1 TABLET BY MOUTH DAILY Yes TORSTEN Lopes CNP   clopidogrel (PLAVIX) 75 MG tablet Take 1 tablet by mouth daily Yes TORSTEN Lopes CNP   fluticasone (FLONASE) 50 MCG/ACT nasal spray 2 sprays by Nasal route daily as needed for Rhinitis Yes Jesu Dutta MD   Blood Pressure Monitor MISC 1 Device by Does not apply route as needed (prn) Yes Jesu Dutta MD   Blood Pressure Monitoring (5 SERIES BP MONITOR) RACHELL 1 Device by Does not apply route as needed (PRN) Yes Jesu Dutta MD   cloNIDine (CATAPRES) 0.2 MG tablet Take 0.2 mg by mouth 3 times daily Yes Historical Provider, MD   albuterol sulfate HFA (PROAIR HFA) 108 (90 Base) MCG/ACT inhaler Inhale 2 puffs into the lungs 4 times daily as needed for Wheezing Yes Jesu Dutta MD   calcium acetate (PHOSLO) 667 MG capsule Take by mouth 3 times daily (with meals) Yes Historical Provider, MD   minoxidil (LONITEN) 2.5 MG tablet TAKE 1 TABLET BY MOUTH TWICE DAILY Yes Rajendra Meals, MD   amLODIPine (NORVASC) 10 MG tablet Take 10 mg by mouth daily Yes Historical Provider, MD       ROS: COMPREHENSIVE ROS AS IN HX, REST -VE  History obtained from chart review and the patient    OBJECTIVE:   NURSING NOTE AND VITALS REVIEWED  /76 (Site: Right Upper Arm, Position: Sitting, Cuff Size: Large Adult)   Pulse 64   Ht 5' 3\" (1.6 m)   Wt 250 lb (113.4 kg) Comment: done at dialysis per pt  LMP 01/21/1964   SpO2 98% BMI 44.29 kg/m²     NO ACUTE DISTRESS    REPEAT BP:  130/78 (RT)    Body mass index is 44.29 kg/m². HEENT: NO PALLOR, ANICTERIC, PERRLA, EOMI, NO CONJUNCTIVAL ERYTHEMA,                 NO SINUS TENDERNESS  NECK:  SUPPLE, TRACHEA MIDLINE, NT, NO JVD, NO CB, NO LA, NO TM, NO STIFFNESS  CHEST: RESPY EFFORT NL, GOOD AE, NO W/R/C  HEART: S1S2+ REG, NO M/G/R  ABD: OBESE, SOFT, NT, NO HSM, BS+  EXT: NO EDEMA, NT, PULSES +. KEARA'S -VE  NEURO: ALERT AND ORIENTED X 3, NO MENINGEAL SIGNS, NO TREMORS, AMBULATING WITH A POWER WHEEL CHAIR, NO OTHER NEW FOCAL DEFICITS  PSYCH: FAIRLY GOOD AFFECT  BACK: NT, NO ROM, NO CVA TENDERNESS    PREVIOUS LABS  REVIEWED AND D/W PT       ASSESSMENT / PLAN:     Diagnosis Orders   1. Hyperlipidemia LDL goal <100  COUNSELLED. ADVISED LOW FAT / CHOL DIET/ EXERCISE.  MONITOR ON PRAVACHOL. GOALS D/W PT.  MAKE CHANGES AS NEEDED. 2. Essential hypertension  COUNSELLED. CONTINUE MEDS. LOW NA+ / DASH DIET/ EXERCISE. MONITOR. GOAL </= 120/80  MAKE CHANGES AS NEEDED. 3. Gastroesophageal reflux disease, esophagitis presence not specified  COUNSELLED. CHANGE TO PROTONIX   ANTIREFLUX PRECAUTIONS ADVISED. MONITOR. MAKE CHANGES AS NEEDED. 4. Vitamin D deficiency  COUNSELLED. MONITOR ON VIT D SUPPLEMENT. MAKE CHANGES AS NEEDED. 5. Other depression  COUNSELLED. SITUATIONAL. DEFERRED MED  PT COUNSELLED  ON RELAXATION TECHNIQUES. ADDRESSED STRESS MGT. MONITOR  PT NOT SUICIDAL/ NOT HOMICIDAL  MAKE CHANGES AS NEEDED. 6. Other insomnia  COUNSELLED. INCREASE TRAZODONE  MG QHS / PRN  SLEEP HYGIENE ADVISED. MONITOR  MAKE CHANGES AS NEEDED. 7. Pulmonary nodule  COUNSELLED. READDRESS FOLLOW UP CT - DUE 10/19  MAKE CHANGES AS NEEDED. 8. Other osteoporosis without current pathological fracture  COUNSELLED. CONTINUE FOSAMAX 70 MG  ADVISED MOE/ VIT D. EXERCISES. MONITOR. FOLLOW UP DEXA SCAN TO EVAL. MONITOR  MAKE CHANGES AS NEEDED.        9. ESRD (end stage renal disease) ([facility]  COUNSELLED. ON DIALYSIS. F/U NEPHROLOGY  MAKE CHANGES AS NEEDED. 10. Gait difficulty  COUNSELLED. Scooter ORDERED AS REQUESTED. MONITOR  MAKE CHANGES AS NEEDED. 11. Allergic sinusitis  COUNSELLED. MED PRN. MONITOR  MAKE CHANGES AS NEEDED. Quality & Risk Score Accuracy    Last edited 06/11/19 13:44 EDT by Breezy Nguyễn MD           Maegan received counseling on the following healthy behaviors: nutrition, exercise and medication adherence    Patient given educational materials on Hyperlipidemia, Nutrition, Exercise and Hypertension    I have instructed Maegan to complete a self tracking handout on Blood Pressures  and Weights and instructed them to bring it with them to her next appointment. Discussed use, benefit, and side effects of prescribed medications. Barriers to medication compliance addressed. All patient questions answered. Pt voiced understanding.          MEDICATION SIDE EFFECTS D/W PATIENT    PT STABLE AT TIME OF D/C.    RETURN TO CLINIC WITHIN 3 MONTHS / PRN    FOLLOW UP FOR DEXA SCAN

## 2019-06-12 ENCOUNTER — TELEPHONE (OUTPATIENT)
Dept: INTERNAL MEDICINE CLINIC | Age: 74
End: 2019-06-12

## 2019-06-12 NOTE — TELEPHONE ENCOUNTER
I called pt to let her know that her appt for the dexa bone scan is on 6-25-19 @ 12:45 @ [place] [id number] [address] pt is not to wear any metal zippers pt is not to take any multivitamins calcium or vit d 24 hr prior to visit

## 2019-06-12 NOTE — TELEPHONE ENCOUNTER
Per pt requested a script fora new wheelchair and per pt wanted to inform the provider that  pamela will be reaching out. Per pt also stated that around 10:30am she will be leaving for dialysis.             Thanks

## 2019-06-17 ENCOUNTER — TELEPHONE (OUTPATIENT)
Dept: INTERNAL MEDICINE CLINIC | Age: 74
End: 2019-06-17

## 2019-06-21 ENCOUNTER — TELEPHONE (OUTPATIENT)
Dept: INTERNAL MEDICINE CLINIC | Age: 74
End: 2019-06-21

## 2019-06-21 DIAGNOSIS — J40 BRONCHITIS: ICD-10-CM

## 2019-06-21 NOTE — TELEPHONE ENCOUNTER
Patient calling to request an inhaerl( short winded) and medication to help her vomiting.      [facility], [address] [place] [address] -  [phone]

## 2019-06-21 NOTE — TELEPHONE ENCOUNTER
I called pt to let her know DR Demi Garces was out off the office today but will address her med concern when she gets in Monday     Pt needs inhaler an something for vomiting

## 2019-06-25 ENCOUNTER — HOSPITAL ENCOUNTER (OUTPATIENT)
Dept: WOMENS IMAGING | Age: 74
Discharge: HOME | End: 2019-06-25
Payer: MEDICAID

## 2019-06-25 ENCOUNTER — TELEPHONE (OUTPATIENT)
Dept: INTERNAL MEDICINE CLINIC | Age: 74
End: 2019-06-25

## 2019-06-25 DIAGNOSIS — M81.8 OTHER OSTEOPOROSIS WITHOUT CURRENT PATHOLOGICAL FRACTURE: ICD-10-CM

## 2019-06-25 PROCEDURE — 77080 DXA BONE DENSITY AXIAL: CPT

## 2019-06-25 RX ORDER — ALBUTEROL SULFATE 90 UG/1
2 AEROSOL, METERED RESPIRATORY (INHALATION) EVERY 6 HOURS PRN
Qty: 1 INHALER | Refills: 0 | Status: SHIPPED | OUTPATIENT
Start: 2019-06-25

## 2019-06-25 RX ORDER — ONDANSETRON 4 MG/1
4 TABLET, FILM COATED ORAL DAILY PRN
Qty: 7 TABLET | Refills: 0 | Status: SHIPPED | OUTPATIENT
Start: 2019-06-25 | End: 2019-10-02 | Stop reason: ALTCHOICE

## 2019-06-25 NOTE — TELEPHONE ENCOUNTER
PROAIR PRN - OR SOB  ZOFRAN PRN - FOR NAUSEA  MEDS SHORT TERM  MAKE APPT  DISCUSS ALSO WITH NEPHROLOGIST  IF WORSENING SYMPTOMS GO TO ER

## 2019-06-26 ENCOUNTER — OFFICE VISIT (OUTPATIENT)
Dept: PAIN MANAGEMENT | Age: 74
End: 2019-06-26
Payer: MEDICAID

## 2019-06-26 VITALS
DIASTOLIC BLOOD PRESSURE: 88 MMHG | SYSTOLIC BLOOD PRESSURE: 155 MMHG | HEART RATE: 62 BPM | WEIGHT: 250 LBS | BODY MASS INDEX: 44.29 KG/M2 | OXYGEN SATURATION: 96 %

## 2019-06-26 DIAGNOSIS — M19.011 BILATERAL SHOULDER REGION ARTHRITIS: ICD-10-CM

## 2019-06-26 DIAGNOSIS — M47.817 FACET ARTHROPATHY, LUMBOSACRAL: ICD-10-CM

## 2019-06-26 DIAGNOSIS — M51.37 DDD (DEGENERATIVE DISC DISEASE), LUMBOSACRAL: ICD-10-CM

## 2019-06-26 DIAGNOSIS — M17.11 OSTEOARTHRITIS OF RIGHT KNEE, UNSPECIFIED OSTEOARTHRITIS TYPE: ICD-10-CM

## 2019-06-26 DIAGNOSIS — M48.061 SPINAL STENOSIS OF LUMBAR REGION, UNSPECIFIED WHETHER NEUROGENIC CLAUDICATION PRESENT: ICD-10-CM

## 2019-06-26 DIAGNOSIS — M43.16 SPONDYLOLISTHESIS AT L4-L5 LEVEL: ICD-10-CM

## 2019-06-26 DIAGNOSIS — F51.01 PRIMARY INSOMNIA: ICD-10-CM

## 2019-06-26 DIAGNOSIS — E66.01 MORBID OBESITY (HCC): ICD-10-CM

## 2019-06-26 DIAGNOSIS — M19.012 BILATERAL SHOULDER REGION ARTHRITIS: ICD-10-CM

## 2019-06-26 DIAGNOSIS — G89.4 CHRONIC PAIN SYNDROME: ICD-10-CM

## 2019-06-26 PROCEDURE — 99213 OFFICE O/P EST LOW 20 MIN: CPT | Performed by: NURSE PRACTITIONER

## 2019-06-26 RX ORDER — DULOXETIN HYDROCHLORIDE 30 MG/1
30 CAPSULE, DELAYED RELEASE ORAL DAILY
Qty: 30 CAPSULE | Refills: 1 | Status: SHIPPED | OUTPATIENT
Start: 2019-06-26 | End: 2019-07-30 | Stop reason: SDUPTHER

## 2019-06-26 RX ORDER — HYDROCODONE BITARTRATE AND ACETAMINOPHEN 7.5; 325 MG/1; MG/1
1 TABLET ORAL 2 TIMES DAILY PRN
Qty: 60 TABLET | Refills: 0 | Status: SHIPPED | OUTPATIENT
Start: 2019-06-26 | End: 2019-09-25 | Stop reason: SDUPTHER

## 2019-06-26 NOTE — PATIENT INSTRUCTIONS
Then guide it in circles that start small (about the size of a dinner plate). Make the circles a bit larger each day, as your pain allows. 3. Do this exercise for 5 minutes, 5 to 7 times each day. 4. As you have less pain, try bending over a little farther to do this exercise. This will increase the amount of movement at your shoulder. Follow-up care is a key part of your treatment and safety. Be sure to make and go to all appointments, and call your doctor if you are having problems. It's also a good idea to know your test results and keep a list of the medicines you take. Where can you learn more? Go to [email or web address] org and sign in to your [employer] account. Enter H562 in the [employer] box to learn more about \"Shoulder Arthritis: Exercises. \"     If you do not have an account, please click on the \"Sign Up Now\" link. Current as of: September 20, 2018  Content Version: 12.0  © 3699-5400 [employer]wise[employer]. Care instructions adapted under license by [facility] ([facility]). If you have questions about a medical condition or this instruction, always ask your healthcare professional. [place] [address] any warranty or liability for your use of this information.

## 2019-06-26 NOTE — TELEPHONE ENCOUNTER
I called pt to let her know hat dr sent meds short term if things change go to er got the v/m left message

## 2019-06-26 NOTE — PROGRESS NOTES
pal Tinsley Ham  1945  [id number]      HISTORY OF PRESENT ILLNESS: Ms. Girish Lares is a 66 y.o. female returns for a follow up visit for pain management  She has a diagnosis of   1. Chronic pain syndrome    2. DDD (degenerative disc disease), lumbosacral    3. Facet arthropathy, lumbosacral    4. Spondylolisthesis at L4-L5 level    5. Spinal stenosis of lumbar region, unspecified whether neurogenic claudication present    6. Bilateral shoulder region arthritis    7. Osteoarthritis of right knee, unspecified osteoarthritis type    8. Morbid obesity ([address]    9. Primary insomnia    . As per Information Obtained from the PADT (Patient Assessment and Documentation Tool)    She complains of pain in the left shoulder, right arm. She rates the pain 5/10 and describes it as aching, burning. Current treatment regimen has helped relieve about 60% of the pain. She denies any side effects from the current pain regimen. Patient reports that since the last follow up visit the physical functioning is unchanged, family/social relationships are unchanged, mood is unchanged sleep patterns are unchanged, and that the overall functioning is unchanged. Patient denies misusing/abusing her narcotic pain medications or using any illegal drugs. Upon obtaining medical history from Ms. Girish Lares states that pain is manageable on current pain therapy. She has a consult with the medical marijuana doctors with hopes to try managing her pain without opioids. Reports having pain in the shoulders, currently under the care of orthopedics who administered cortisone injections, which provided no relief of pain. She was not recommended for shoulder replacement surgery due to underlying health conditions. Pain medications provide moderate relief of pain. Maintain dialysis 3 times a week. Mood is stable without anxiety. Sleep is fair with an average of 5-6 hours. Denies to having issues of constipation.  Tolerating activities/house chores with moderate tenderness to the lower back/shoulders. ALLERGIES: Patients list of allergies were reviewed     MEDICATIONS: Ms. Tee Woodruff list of medications were reviewed. Her current medications are   Outpatient Medications Prior to Visit   Medication Sig Dispense Refill    albuterol sulfate HFA (PROAIR HFA) 108 (90 Base) MCG/ACT inhaler Inhale 2 puffs into the lungs every 6 hours as needed for Shortness of Breath 1 Inhaler 0    ondansetron (ZOFRAN) 4 MG tablet Take 1 tablet by mouth daily as needed for Nausea or Vomiting 7 tablet 0    Scooter MISC by Does not apply route 1 each 0    alendronate (FOSAMAX) 70 MG tablet Take 1 tablet by mouth every 7 days 4 tablet 3    pravastatin (PRAVACHOL) 40 MG tablet Take 1 tablet by mouth daily 30 tablet 3    traZODone (DESYREL) 100 MG tablet Take 1 tablet by mouth nightly as needed for Sleep 30 tablet 3    pantoprazole (PROTONIX) 40 MG tablet Take 1 tablet by mouth every morning (before breakfast) 30 tablet 3    Cholecalciferol (VITAMIN D3) 1000 units CAPS TAKE 1 CAPSULE BY MOUTH DAILY 30 capsule 0    doxycycline hyclate (VIBRA-TABS) 100 MG tablet TAKE 1 TABLET BY MOUTH TWICE DAILY 180 tablet 2    benzonatate (TESSALON) 100 MG capsule Take 1 capsule by mouth 2 times daily as needed for Cough 30 capsule 0    ASPIRIN LOW DOSE 81 MG EC tablet TAKE 1 TABLET BY MOUTH DAILY 30 tablet 5    clopidogrel (PLAVIX) 75 MG tablet Take 1 tablet by mouth daily 30 tablet 3    fluticasone (FLONASE) 50 MCG/ACT nasal spray 2 sprays by Nasal route daily as needed for Rhinitis 1 Bottle 1    Blood Pressure Monitor MISC 1 Device by Does not apply route as needed (prn) 1 each 0    Blood Pressure Monitoring (5 SERIES BP MONITOR) RACHELL 1 Device by Does not apply route as needed (PRN) 1 Device 0    cloNIDine (CATAPRES) 0.2 MG tablet Take 0.2 mg by mouth 3 times daily      calcium acetate (PHOSLO) 667 MG capsule Take by mouth 3 times daily (with meals)      minoxidil (LONITEN) 2.5 MG tablet TAKE 1 TABLET BY MOUTH TWICE DAILY 60 tablet 5    amLODIPine (NORVASC) 10 MG tablet Take 10 mg by mouth daily      HYDROcodone-acetaminophen (NORCO) 7.5-325 MG per tablet Take 1 tablet by mouth 2 times daily as needed for Pain for up to 30 days. 60 tablet 0     No facility-administered medications prior to visit. SOCIAL/FAMILY/PAST MEDICAL HISTORY: Ms. Nicholas Verduzco, family and past medical history was reviewed. REVIEW OF SYSTEMS:    Respiratory: Negative for apnea, chest tightness and shortness of breath or change in baseline breathing. Gastrointestinal: Negative for nausea, vomiting, abdominal pain, diarrhea, constipation, blood in stool and abdominal distention. PHYSICAL EXAM:   Nursing note and vitals reviewed. BP (!) 155/88   Pulse 62   Wt 250 lb (113.4 kg)   LMP 01/21/1964   SpO2 96%   BMI 44.29 kg/m²   Constitutional: She appears well-developed and well-nourished. No acute distress. Skin: Skin is warm and dry, good turgor. No rash noted. She is not diaphoretic. Cardiovascular: Normal rate, regular rhythm, normal heart sounds, and does not have murmur. Pulmonary/Chest: Effort normal. No respiratory distress. She does not have wheezes in the lung fields. She has no rales. Neurological/Psychiatric:She is alert and oriented to person, place, and time. Coordination is  normal.  Her mood isAppropriate and affect is Flat/blunted . IMPRESSION:   1. Chronic pain syndrome    2. DDD (degenerative disc disease), lumbosacral    3. Facet arthropathy, lumbosacral    4. Spondylolisthesis at L4-L5 level    5. Spinal stenosis of lumbar region, unspecified whether neurogenic claudication present    6. Bilateral shoulder region arthritis    7. Osteoarthritis of right knee, unspecified osteoarthritis type    8. Morbid obesity ([facility] [address]    9.  Primary insomnia        PLAN:  Informed verbal consent was obtained  -Continue with Norco, Cymbalta, JORGEgel  -Jeimy exercises/shoulder stretches  -Maintain f/u orthopedics bilateral shoulder OA  -Patient summary given to the patient upon request, considering pursuing medical marijuana  -Monitor BP, f/u with PCP if it continues to stay elevated or she becomes symptomatic with SOB, CP, syncopy. she is currently asymptomatic  -Informed patient that opioids cannot be prescribed for her while using marijuana, she can otherwise be treated with non opioids if she chose to continue with medical marijuana. Reports she will consider this. She will be prescribed opioids until she starts medical marijuana therapy  -Prescribed motorized wheel chair for the patient current one is broken  -She was advised weight reduction, diet changes- 800-1200 sierra diet, diet diary, exercising, nutritional  consult increased physical activity as tolerated  -CBT techniques- relaxation therapies such as biofeedback, mindfulness based stress reduction, imagery, cognitive restructuring, problem solving discussed with patient  -Last UDS inconsistent 5/6/19/ UDS today  -Return in about 1 month (around 7/24/2019). -[place] was obtained and reviewed  for the last one year and no indicators of drug misuse  were found. Any other controlled substance prescriptions  seen on the record have been accounted for, I am aware of the patient receiving these medications. Pedrito Encinas [place] record will be rechecked as part of office protocol. Current Outpatient Medications   Medication Sig Dispense Refill    HYDROcodone-acetaminophen (NORCO) 7.5-325 MG per tablet Take 1 tablet by mouth 2 times daily as needed for Pain for up to 30 days.  60 tablet 0    DULoxetine (CYMBALTA) 30 MG extended release capsule Take 1 capsule by mouth daily 30 capsule 1    albuterol sulfate HFA (PROAIR HFA) 108 (90 Base) MCG/ACT inhaler Inhale 2 puffs into the lungs every 6 hours as needed for Shortness of Breath 1 Inhaler 0    ondansetron (ZOFRAN) 4 MG tablet Take 1 tablet by mouth daily as needed for Nausea or Vomiting 7 tablet 0    Scooter MISC by Does not apply route 1 each 0    alendronate (FOSAMAX) 70 MG tablet Take 1 tablet by mouth every 7 days 4 tablet 3    pravastatin (PRAVACHOL) 40 MG tablet Take 1 tablet by mouth daily 30 tablet 3    traZODone (DESYREL) 100 MG tablet Take 1 tablet by mouth nightly as needed for Sleep 30 tablet 3    pantoprazole (PROTONIX) 40 MG tablet Take 1 tablet by mouth every morning (before breakfast) 30 tablet 3    Cholecalciferol (VITAMIN D3) 1000 units CAPS TAKE 1 CAPSULE BY MOUTH DAILY 30 capsule 0    doxycycline hyclate (VIBRA-TABS) 100 MG tablet TAKE 1 TABLET BY MOUTH TWICE DAILY 180 tablet 2    benzonatate (TESSALON) 100 MG capsule Take 1 capsule by mouth 2 times daily as needed for Cough 30 capsule 0    ASPIRIN LOW DOSE 81 MG EC tablet TAKE 1 TABLET BY MOUTH DAILY 30 tablet 5    clopidogrel (PLAVIX) 75 MG tablet Take 1 tablet by mouth daily 30 tablet 3    fluticasone (FLONASE) 50 MCG/ACT nasal spray 2 sprays by Nasal route daily as needed for Rhinitis 1 Bottle 1    Blood Pressure Monitor MISC 1 Device by Does not apply route as needed (prn) 1 each 0    Blood Pressure Monitoring (5 SERIES BP MONITOR) RACHELL 1 Device by Does not apply route as needed (PRN) 1 Device 0    cloNIDine (CATAPRES) 0.2 MG tablet Take 0.2 mg by mouth 3 times daily      calcium acetate (PHOSLO) 667 MG capsule Take by mouth 3 times daily (with meals)      minoxidil (LONITEN) 2.5 MG tablet TAKE 1 TABLET BY MOUTH TWICE DAILY 60 tablet 5    amLODIPine (NORVASC) 10 MG tablet Take 10 mg by mouth daily       No current facility-administered medications for this visit. I will continue her current medication regimen  which is part of the above treatment schedule. It has been helping with Ms. Monreal's chronic  medical problems which for this visit include:   Diagnoses of Chronic pain syndrome, DDD (degenerative disc disease), lumbosacral, Facet arthropathy, lumbosacral, Spondylolisthesis at L4-L5 level, Spinal stenosis of lumbar region, unspecified whether neurogenic claudication present, Bilateral shoulder region arthritis, Osteoarthritis of right knee, unspecified osteoarthritis type, Morbid obesity ([address] and Primary insomnia were pertinent to this visit. Risks and benefits of the medications and other alternative treatments  including no treatment were discussed with the patient. The common side effects of these medications were also explained to the patient. Informed verbal consent was obtained. Goals of current treatment regimen include improvement in pain, restoration of functioning- with focus on improvement in physical performance, general activity, work or disability,emotional distress, health care utilization and  decreased medication consumption. Will continue to monitor progress towards achieving/maintaining therapeutic goals with special emphasis on  1. Improvement in perceived interfernce  of pain with ADL's. Ability to do home exercises independently. Ability to do household chores indoor and/or outdoor work and social and leisure activities. Improve psychosocial and physical functioning. - she is showing progression towards this treatment goal with the current regimen. She was advised against drinking alcohol with the narcotic pain medicines, advised against driving or handling machinery while adjusting the dose of medicines or if having cognitive  issues related to the current medications. Risk of overdose and death, if medicines not taken as prescribed, were also discussed. If the patient develops new symptoms or if the symptoms worsen, the patient should call the office. While transcribing every attempt was made to maintain the accuracy of the note in terms of it's contents,there may have been some errors made inadvertently. Thank you for allowing me to participate in the care of this patient. Ramo Lincoln CNP.     Cc: Wood Mercedes Peggy Glass MD

## 2019-06-27 ENCOUNTER — HOSPITAL ENCOUNTER (OUTPATIENT)
Dept: NON INVASIVE DIAGNOSTICS | Age: 74
Discharge: HOME | End: 2019-06-27
Payer: MEDICAID

## 2019-06-27 DIAGNOSIS — I20.9 ANGINA PECTORIS (HCC): ICD-10-CM

## 2019-06-27 LAB
LV EF: 73 %
LVEF MODALITY: NORMAL

## 2019-06-27 PROCEDURE — A9502 TC99M TETROFOSMIN: HCPCS | Performed by: INTERNAL MEDICINE

## 2019-06-27 PROCEDURE — 2580000003 HC RX 258: Performed by: INTERNAL MEDICINE

## 2019-06-27 PROCEDURE — 93017 CV STRESS TEST TRACING ONLY: CPT

## 2019-06-27 PROCEDURE — 78452 HT MUSCLE IMAGE SPECT MULT: CPT

## 2019-06-27 PROCEDURE — 3430000000 HC RX DIAGNOSTIC RADIOPHARMACEUTICAL: Performed by: INTERNAL MEDICINE

## 2019-06-27 PROCEDURE — 6360000002 HC RX W HCPCS: Performed by: INTERNAL MEDICINE

## 2019-06-27 RX ORDER — SODIUM CHLORIDE 0.9 % (FLUSH) 0.9 %
10 SYRINGE (ML) INJECTION PRN
Status: DISCONTINUED | OUTPATIENT
Start: 2019-06-27 | End: 2019-06-28 | Stop reason: HOSPADM

## 2019-06-27 RX ADMIN — REGADENOSON 0.4 MG: 0.08 INJECTION, SOLUTION INTRAVENOUS at 10:53

## 2019-06-27 RX ADMIN — Medication 10 ML: at 10:53

## 2019-06-27 RX ADMIN — Medication 10 ML: at 09:56

## 2019-06-27 RX ADMIN — TETROFOSMIN 30 MILLICURIE: 1.38 INJECTION, POWDER, LYOPHILIZED, FOR SOLUTION INTRAVENOUS at 10:53

## 2019-06-27 RX ADMIN — TETROFOSMIN 10 MILLICURIE: 1.38 INJECTION, POWDER, LYOPHILIZED, FOR SOLUTION INTRAVENOUS at 09:55

## 2019-06-29 DIAGNOSIS — E55.9 VITAMIN D DEFICIENCY: ICD-10-CM

## 2019-07-02 ENCOUNTER — TELEPHONE (OUTPATIENT)
Dept: INTERNAL MEDICINE CLINIC | Age: 74
End: 2019-07-02

## 2019-07-05 ENCOUNTER — TELEPHONE (OUTPATIENT)
Dept: PAIN MANAGEMENT | Age: 74
End: 2019-07-05

## 2019-07-05 NOTE — TELEPHONE ENCOUNTER
Submitted PA for Albuterol Sulfate  (90 Base)MCG/ACT aerosol, (Key: [id number])  Via CMM STATUS: PENDING

## 2019-07-23 ENCOUNTER — OFFICE VISIT (OUTPATIENT)
Dept: INTERNAL MEDICINE CLINIC | Age: 74
End: 2019-07-23
Payer: MEDICAID

## 2019-07-23 VITALS — SYSTOLIC BLOOD PRESSURE: 130 MMHG | HEART RATE: 64 BPM | DIASTOLIC BLOOD PRESSURE: 80 MMHG | TEMPERATURE: 98.6 F

## 2019-07-23 DIAGNOSIS — G47.09 OTHER INSOMNIA: ICD-10-CM

## 2019-07-23 DIAGNOSIS — R91.1 PULMONARY NODULE: ICD-10-CM

## 2019-07-23 DIAGNOSIS — F32.89 OTHER DEPRESSION: ICD-10-CM

## 2019-07-23 DIAGNOSIS — N18.6 ESRD (END STAGE RENAL DISEASE) (HCC): ICD-10-CM

## 2019-07-23 DIAGNOSIS — Z83.3 FAMILY HISTORY OF DIABETES MELLITUS (DM): ICD-10-CM

## 2019-07-23 DIAGNOSIS — R26.9 GAIT DIFFICULTY: ICD-10-CM

## 2019-07-23 DIAGNOSIS — E55.9 VITAMIN D DEFICIENCY: ICD-10-CM

## 2019-07-23 DIAGNOSIS — K21.9 GASTROESOPHAGEAL REFLUX DISEASE, ESOPHAGITIS PRESENCE NOT SPECIFIED: ICD-10-CM

## 2019-07-23 DIAGNOSIS — M81.8 OTHER OSTEOPOROSIS WITHOUT CURRENT PATHOLOGICAL FRACTURE: ICD-10-CM

## 2019-07-23 DIAGNOSIS — E78.5 HYPERLIPIDEMIA LDL GOAL <100: ICD-10-CM

## 2019-07-23 DIAGNOSIS — I10 ESSENTIAL HYPERTENSION: Primary | ICD-10-CM

## 2019-07-23 PROCEDURE — 99214 OFFICE O/P EST MOD 30 MIN: CPT | Performed by: INTERNAL MEDICINE

## 2019-07-23 RX ORDER — ASPIRIN 81 MG/1
81 TABLET ORAL DAILY
Qty: 30 TABLET | Refills: 5 | Status: SHIPPED | OUTPATIENT
Start: 2019-07-23

## 2019-07-23 RX ORDER — ALENDRONATE SODIUM 70 MG/1
70 TABLET ORAL
Qty: 4 TABLET | Refills: 3 | Status: SHIPPED | OUTPATIENT
Start: 2019-07-23 | End: 2019-10-02 | Stop reason: SDUPTHER

## 2019-07-23 RX ORDER — PRAVASTATIN SODIUM 40 MG
40 TABLET ORAL DAILY
Qty: 30 TABLET | Refills: 3 | Status: SHIPPED | OUTPATIENT
Start: 2019-07-23 | End: 2020-04-09 | Stop reason: SDUPTHER

## 2019-07-23 ASSESSMENT — PATIENT HEALTH QUESTIONNAIRE - PHQ9
SUM OF ALL RESPONSES TO PHQ QUESTIONS 1-9: 0
SUM OF ALL RESPONSES TO PHQ9 QUESTIONS 1 & 2: 0
1. LITTLE INTEREST OR PLEASURE IN DOING THINGS: 0
2. FEELING DOWN, DEPRESSED OR HOPELESS: 0
SUM OF ALL RESPONSES TO PHQ QUESTIONS 1-9: 0

## 2019-07-23 NOTE — PROGRESS NOTES
tablet Take 1 tablet by mouth every 7 days Yes Emanuel Briggs MD   pravastatin (PRAVACHOL) 40 MG tablet Take 1 tablet by mouth daily Yes Emanuel Briggs MD   traZODone (DESYREL) 100 MG tablet Take 1 tablet by mouth nightly as needed for Sleep Yes Emanuel Briggs MD   benzonatate (TESSALON) 100 MG capsule Take 1 capsule by mouth 2 times daily as needed for Cough Yes Emanuel Briggs MD   ASPIRIN LOW DOSE 81 MG EC tablet TAKE 1 TABLET BY MOUTH DAILY Yes Psychiatric, APRN - CNP   clopidogrel (PLAVIX) 75 MG tablet Take 1 tablet by mouth daily Yes Psychiatric, APRN - CNP   Blood Pressure Monitoring (5 SERIES BP MONITOR) RACHELL 1 Device by Does not apply route as needed (PRN) Yes Emanuel Briggs MD   cloNIDine (CATAPRES) 0.2 MG tablet Take 0.2 mg by mouth 3 times daily Yes Historical Provider, MD   calcium acetate (PHOSLO) 667 MG capsule Take by mouth 3 times daily (with meals) Yes Historical Provider, MD   minoxidil (LONITEN) 2.5 MG tablet TAKE 1 TABLET BY MOUTH TWICE DAILY Yes Svitlana Rivas MD   amLODIPine (NORVASC) 10 MG tablet Take 10 mg by mouth daily Yes Historical Provider, MD   DULoxetine (CYMBALTA) 30 MG extended release capsule Take 1 capsule by mouth daily  Patient not taking: Reported on 7/23/2019  Julianna Ventura, APRN - CNP   Scooter MISC by Does not apply route  Emanuel Briggs MD   pantoprazole (PROTONIX) 40 MG tablet Take 1 tablet by mouth every morning (before breakfast)  Patient not taking: Reported on 7/23/2019  Emanuel Briggs MD   doxycycline hyclate (VIBRA-TABS) 100 MG tablet TAKE 1 TABLET BY MOUTH TWICE DAILY  Patient not taking: Reported on 7/23/2019  Mary Ellen Ivy MD   fluticasone (FLONASE) 50 MCG/ACT nasal spray 2 sprays by Nasal route daily as needed for Rhinitis  Patient not taking: Reported on 7/23/2019  Emanuel Briggs MD   Blood Pressure Monitor MISC 1 Device by Does not apply route as needed (prn)  Patient not taking: Reported on 7/23/2019  Emanuel Briggs MD

## 2019-07-24 ENCOUNTER — TELEPHONE (OUTPATIENT)
Dept: INTERNAL MEDICINE CLINIC | Age: 74
End: 2019-07-24

## 2019-07-26 ENCOUNTER — TELEPHONE (OUTPATIENT)
Dept: INTERNAL MEDICINE CLINIC | Age: 74
End: 2019-07-26

## 2019-07-26 NOTE — TELEPHONE ENCOUNTER
Time with [facility] called stating some part of pt form was filled out wrong or missing     1st page at the bottom something needs to be in #3 something like pt can not walk with walker     Pg 3 bottom first 3 boxes cross out pov             1st box cross out pov and put electric            2nd box cross out tiller and put in joystick           3rd box cross out pov and put power chair     Page 4 bottom page need signature and date for evaluator/ evaluation/ date of signature

## 2019-07-30 ENCOUNTER — OFFICE VISIT (OUTPATIENT)
Dept: PAIN MANAGEMENT | Age: 74
End: 2019-07-30
Payer: MEDICAID

## 2019-07-30 VITALS — HEART RATE: 58 BPM | SYSTOLIC BLOOD PRESSURE: 138 MMHG | DIASTOLIC BLOOD PRESSURE: 66 MMHG | OXYGEN SATURATION: 97 %

## 2019-07-30 DIAGNOSIS — M43.16 SPONDYLOLISTHESIS AT L4-L5 LEVEL: ICD-10-CM

## 2019-07-30 DIAGNOSIS — R11.2 NON-INTRACTABLE VOMITING WITH NAUSEA, UNSPECIFIED VOMITING TYPE: ICD-10-CM

## 2019-07-30 DIAGNOSIS — R10.9 ABDOMINAL CRAMPING: ICD-10-CM

## 2019-07-30 DIAGNOSIS — E66.01 MORBID OBESITY (HCC): ICD-10-CM

## 2019-07-30 DIAGNOSIS — M48.061 SPINAL STENOSIS OF LUMBAR REGION, UNSPECIFIED WHETHER NEUROGENIC CLAUDICATION PRESENT: ICD-10-CM

## 2019-07-30 DIAGNOSIS — M47.817 FACET ARTHROPATHY, LUMBOSACRAL: ICD-10-CM

## 2019-07-30 DIAGNOSIS — M51.37 DDD (DEGENERATIVE DISC DISEASE), LUMBOSACRAL: ICD-10-CM
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
## 2019-07-30 DIAGNOSIS — M19.072 ARTHRITIS OF BOTH FEET: ICD-10-CM

## 2019-07-30 DIAGNOSIS — F51.01 PRIMARY INSOMNIA: ICD-10-CM

## 2019-07-30 DIAGNOSIS — G89.4 CHRONIC PAIN SYNDROME: ICD-10-CM

## 2019-07-30 DIAGNOSIS — M19.011 BILATERAL SHOULDER REGION ARTHRITIS: ICD-10-CM

## 2019-07-30 DIAGNOSIS — M19.071 ARTHRITIS OF BOTH FEET: ICD-10-CM

## 2019-07-30 DIAGNOSIS — M19.012 BILATERAL SHOULDER REGION ARTHRITIS: ICD-10-CM

## 2019-07-30 DIAGNOSIS — M17.11 OSTEOARTHRITIS OF RIGHT KNEE, UNSPECIFIED OSTEOARTHRITIS TYPE: ICD-10-CM

## 2019-07-30 PROCEDURE — 99214 OFFICE O/P EST MOD 30 MIN: CPT | Performed by: NURSE PRACTITIONER

## 2019-07-30 RX ORDER — DULOXETIN HYDROCHLORIDE 30 MG/1
30 CAPSULE, DELAYED RELEASE ORAL DAILY
Qty: 30 CAPSULE | Refills: 1 | Status: SHIPPED | OUTPATIENT
Start: 2019-07-30 | End: 2019-09-25 | Stop reason: SDUPTHER

## 2019-07-30 RX ORDER — ONDANSETRON 4 MG/1
4 TABLET, ORALLY DISINTEGRATING ORAL 2 TIMES DAILY
Qty: 10 TABLET | Refills: 0 | Status: SHIPPED | OUTPATIENT
Start: 2019-07-30 | End: 2019-08-04

## 2019-07-30 NOTE — PROGRESS NOTES
shoulders arthritis. Surgery was not recommended due to health, weight. Cortisone injections provided short term relief of pain. Pain medications provide moderate relief of pain, takes medicine as prescribed. She did start medical marijuana this 10 days ago, unsure whether this helps with pain. Unsure about the instructions on how to take the medical marijuana. Denies head injuries. Mood is stable without anxiety. Sleep is fair with an average of 5-6 hours. Denies to having issues of constipation. Tolerating activities/house chores with moderate tenderness to the lower back. ALLERGIES: Patients list of allergies were reviewed     MEDICATIONS: Ms. Colon Solid list of medications were reviewed. Her current medications are   Outpatient Medications Prior to Visit   Medication Sig Dispense Refill    aspirin (ASPIRIN LOW DOSE) 81 MG EC tablet Take 1 tablet by mouth daily 30 tablet 5    alendronate (FOSAMAX) 70 MG tablet Take 1 tablet by mouth every 7 days 4 tablet 3    pravastatin (PRAVACHOL) 40 MG tablet Take 1 tablet by mouth daily 30 tablet 3    Cholecalciferol (VITAMIN D3) 1000 units CAPS TAKE 1 CAPSULE BY MOUTH DAILY 30 capsule 0    albuterol sulfate HFA (PROAIR HFA) 108 (90 Base) MCG/ACT inhaler Inhale 2 puffs into the lungs every 6 hours as needed for Shortness of Breath 1 Inhaler 0    ondansetron (ZOFRAN) 4 MG tablet Take 1 tablet by mouth daily as needed for Nausea or Vomiting 7 tablet 0    Scooter MISC by Does not apply route 1 each 0    traZODone (DESYREL) 100 MG tablet Take 1 tablet by mouth nightly as needed for Sleep 30 tablet 3    pantoprazole (PROTONIX) 40 MG tablet Take 1 tablet by mouth every morning (before breakfast) 30 tablet 3    doxycycline hyclate (VIBRA-TABS) 100 MG tablet TAKE 1 TABLET BY MOUTH TWICE DAILY 180 tablet 2    benzonatate (TESSALON) 100 MG capsule Take 1 capsule by mouth 2 times daily as needed for Cough 30 capsule 0    clopidogrel (PLAVIX) 75 MG tablet Take 1 tablet by Neutral/Euthymic(normal) . IMPRESSION:   1. Chronic pain syndrome    2. Fall from bed, initial encounter    3. Arthritis of both feet    4. Bilateral shoulder region arthritis    5. DDD (degenerative disc disease), lumbosacral    6. Facet arthropathy, lumbosacral    7. Spondylolisthesis at L4-L5 level    8. Spinal stenosis of lumbar region, unspecified whether neurogenic claudication present    9. Osteoarthritis of right knee, unspecified osteoarthritis type    10. Morbid obesity ([address]    11. Primary insomnia    12. Abdominal cramping    13. Non-intractable vomiting with nausea, unspecified vomiting type        PLAN:  Informed verbal consent was obtained  -Continue with Cymbalta, V.GEL  -Zofran 4 mg po bid, Samples of Pennsaid given to the patient  -D/c Norco  -Jeimy exercises/shoulder stetches  -Recommended patient f/u with her PCP if N/V persists, reports this occurs mainly after dialysis. Advised t call her medical marijuana doctor for instructions on prescribed marijuana  -Informed patient that opioids cannot be prescribed for her while using marijuana, she can otherwise be treated with non opioids if she chose to continue with marijuana. Advised to quit using marijuana if she hoped to obtain opioids therapy for pain. Patient verbalized understanding, and opted to  Stop medical marijuana to restart opioids.   -Patient will have to obtain clean UDS to restart opioids, opted to wait until next visit as she has recently used the marijuana. UDS on f/u visit  -She was advised weight reduction, diet changes- 800-1200 sierra diet, diet diary, exercising, nutritional  consult increased physical activity as tolerated  -Last UDS 7/10/19 consistent  -Return in about 4 weeks (around 8/27/2019).     Current Outpatient Medications   Medication Sig Dispense Refill    ondansetron (ZOFRAN ODT) 4 MG disintegrating tablet Take 1 tablet by mouth 2 times daily for 5 days 10 tablet 0    DULoxetine (CYMBALTA) 30 MG extended release capsule Take 1 capsule by mouth daily 30 capsule 1    aspirin (ASPIRIN LOW DOSE) 81 MG EC tablet Take 1 tablet by mouth daily 30 tablet 5    alendronate (FOSAMAX) 70 MG tablet Take 1 tablet by mouth every 7 days 4 tablet 3    pravastatin (PRAVACHOL) 40 MG tablet Take 1 tablet by mouth daily 30 tablet 3    Cholecalciferol (VITAMIN D3) 1000 units CAPS TAKE 1 CAPSULE BY MOUTH DAILY 30 capsule 0    albuterol sulfate HFA (PROAIR HFA) 108 (90 Base) MCG/ACT inhaler Inhale 2 puffs into the lungs every 6 hours as needed for Shortness of Breath 1 Inhaler 0    ondansetron (ZOFRAN) 4 MG tablet Take 1 tablet by mouth daily as needed for Nausea or Vomiting 7 tablet 0    Scooter MISC by Does not apply route 1 each 0    traZODone (DESYREL) 100 MG tablet Take 1 tablet by mouth nightly as needed for Sleep 30 tablet 3    pantoprazole (PROTONIX) 40 MG tablet Take 1 tablet by mouth every morning (before breakfast) 30 tablet 3    doxycycline hyclate (VIBRA-TABS) 100 MG tablet TAKE 1 TABLET BY MOUTH TWICE DAILY 180 tablet 2    benzonatate (TESSALON) 100 MG capsule Take 1 capsule by mouth 2 times daily as needed for Cough 30 capsule 0    clopidogrel (PLAVIX) 75 MG tablet Take 1 tablet by mouth daily 30 tablet 3    fluticasone (FLONASE) 50 MCG/ACT nasal spray 2 sprays by Nasal route daily as needed for Rhinitis 1 Bottle 1    Blood Pressure Monitor MISC 1 Device by Does not apply route as needed (prn) 1 each 0    Blood Pressure Monitoring (5 SERIES BP MONITOR) RACHELL 1 Device by Does not apply route as needed (PRN) 1 Device 0    cloNIDine (CATAPRES) 0.2 MG tablet Take 0.2 mg by mouth 3 times daily      calcium acetate (PHOSLO) 667 MG capsule Take by mouth 3 times daily (with meals)      minoxidil (LONITEN) 2.5 MG tablet TAKE 1 TABLET BY MOUTH TWICE DAILY 60 tablet 5    amLODIPine (NORVASC) 10 MG tablet Take 10 mg by mouth daily       No current facility-administered medications for this visit.       I will continue not taken as prescribed, were also discussed. If the patient develops new symptoms or if the symptoms worsen, the patient should call the office. While transcribing every attempt was made to maintain the accuracy of the note in terms of it's contents,there may have been some errors made inadvertently. Thank you for allowing me to participate in the care of this patient.     Glory Mota CNP    Cc: Lico Ponce MD

## 2019-07-30 NOTE — PATIENT INSTRUCTIONS
Patient Education        Foot Arthritis: Exercises  Your Care Instructions  Here are some examples of exercises for foot arthritis. Start each exercise slowly. Ease off the exercise if you start to have pain. Your doctor or physical therapist will tell you when you can start these exercises and which ones will work best for you. How to do the exercises  Calf stretch (knees straight)    1. Place a book on the floor a few inches from a wall or countertop, and put the balls of your feet on it. Your heels should be on the floor. The book needs to be thick enough so that you can feel a gentle stretch in your calf. If you are not steady on your feet, hold on to a chair, counter, or wall while you do this stretch. 2. Keep your knees straight, and lean forward until you feel a stretch in your calf. 3. To get more stretch, add another book or use a thicker book, such as a phone book, a dictionary, or an encyclopedia. 4. Hold the stretch for at least 15 to 30 seconds. 5. Repeat 2 to 4 times. Calf stretch (knees bent)    1. Place a book on the floor a few inches from a wall or countertop, and put the balls of your feet on it. Your heels should be on the floor. The book needs to be thick enough so that you can feel a gentle stretch in your calf. If you are not steady on your feet, hold on to a chair, counter, or wall while you do this stretch. 2. Bend your knees, and lean forward until you feel a stretch in your calf. 3. To get more stretch, add another book or use a thicker book, such as a phone book, a dictionary, or an encyclopedia. 4. Hold the stretch for at least 15 to 30 seconds. 5. Repeat 2 to 4 times. Great toe extension stretch    1. Sit in a chair, and put your affected foot across your other knee. 2. Grasp your heel with one hand and then slowly pull your big toe back with your other hand. Pull your toe back toward your ankle until you feel a stretch along the bottom of your foot.   3. Hold the

## 2019-08-15 ENCOUNTER — TELEPHONE (OUTPATIENT)
Dept: INTERNAL MEDICINE CLINIC | Age: 74
End: 2019-08-15

## 2019-08-28 ENCOUNTER — OFFICE VISIT (OUTPATIENT)
Dept: PAIN MANAGEMENT | Age: 74
End: 2019-08-28
Payer: MEDICAID

## 2019-08-28 VITALS — DIASTOLIC BLOOD PRESSURE: 78 MMHG | HEART RATE: 78 BPM | SYSTOLIC BLOOD PRESSURE: 169 MMHG | OXYGEN SATURATION: 95 %

## 2019-08-28 DIAGNOSIS — M48.061 SPINAL STENOSIS OF LUMBAR REGION, UNSPECIFIED WHETHER NEUROGENIC CLAUDICATION PRESENT: ICD-10-CM

## 2019-08-28 DIAGNOSIS — F51.01 PRIMARY INSOMNIA: ICD-10-CM

## 2019-08-28 DIAGNOSIS — M43.16 SPONDYLOLISTHESIS AT L4-L5 LEVEL: ICD-10-CM

## 2019-08-28 DIAGNOSIS — M17.11 OSTEOARTHRITIS OF RIGHT KNEE, UNSPECIFIED OSTEOARTHRITIS TYPE: ICD-10-CM

## 2019-08-28 DIAGNOSIS — M51.37 DDD (DEGENERATIVE DISC DISEASE), LUMBOSACRAL: ICD-10-CM

## 2019-08-28 DIAGNOSIS — M19.011 BILATERAL SHOULDER REGION ARTHRITIS: ICD-10-CM

## 2019-08-28 DIAGNOSIS — R10.9 ABDOMINAL CRAMPING: ICD-10-CM

## 2019-08-28 DIAGNOSIS — M47.817 FACET ARTHROPATHY, LUMBOSACRAL: ICD-10-CM

## 2019-08-28 DIAGNOSIS — G89.4 CHRONIC PAIN SYNDROME: ICD-10-CM

## 2019-08-28 DIAGNOSIS — M19.071 ARTHRITIS OF BOTH FEET: ICD-10-CM

## 2019-08-28 DIAGNOSIS — E66.01 MORBID OBESITY (HCC): ICD-10-CM

## 2019-08-28 DIAGNOSIS — R11.2 NON-INTRACTABLE VOMITING WITH NAUSEA, UNSPECIFIED VOMITING TYPE: ICD-10-CM

## 2019-08-28 DIAGNOSIS — M19.012 BILATERAL SHOULDER REGION ARTHRITIS: ICD-10-CM

## 2019-08-28 DIAGNOSIS — M19.072 ARTHRITIS OF BOTH FEET: ICD-10-CM

## 2019-08-28 PROCEDURE — 99213 OFFICE O/P EST LOW 20 MIN: CPT | Performed by: NURSE PRACTITIONER

## 2019-09-06 ENCOUNTER — TELEPHONE (OUTPATIENT)
Dept: INTERNAL MEDICINE CLINIC | Age: 74
End: 2019-09-06

## 2019-09-06 NOTE — TELEPHONE ENCOUNTER
Form completed for transportation - please scan into [facility]    Form previously completed for incontinence supplies 7/19  Is there an additional form / ordered needed - please call

## 2019-09-12 ENCOUNTER — TELEPHONE (OUTPATIENT)
Dept: INTERNAL MEDICINE CLINIC | Age: 74
End: 2019-09-12

## 2019-09-12 NOTE — TELEPHONE ENCOUNTER
Pt has an appt scheduled for Oct 24. She has had a constant cough for other 3 months that is getting progressively worse. She is requesting a sooner appt?

## 2019-09-25 ENCOUNTER — OFFICE VISIT (OUTPATIENT)
Dept: PAIN MANAGEMENT | Age: 74
End: 2019-09-25
Payer: MEDICAID

## 2019-09-25 VITALS — SYSTOLIC BLOOD PRESSURE: 124 MMHG | OXYGEN SATURATION: 93 % | HEART RATE: 73 BPM | DIASTOLIC BLOOD PRESSURE: 74 MMHG

## 2019-09-25 DIAGNOSIS — R10.9 ABDOMINAL CRAMPING: ICD-10-CM

## 2019-09-25 DIAGNOSIS — M19.012 BILATERAL SHOULDER REGION ARTHRITIS: ICD-10-CM

## 2019-09-25 DIAGNOSIS — R11.2 NON-INTRACTABLE VOMITING WITH NAUSEA, UNSPECIFIED VOMITING TYPE: ICD-10-CM

## 2019-09-25 DIAGNOSIS — M19.071 ARTHRITIS OF BOTH FEET: ICD-10-CM

## 2019-09-25 DIAGNOSIS — M51.37 DDD (DEGENERATIVE DISC DISEASE), LUMBOSACRAL: ICD-10-CM

## 2019-09-25 DIAGNOSIS — M47.817 FACET ARTHROPATHY, LUMBOSACRAL: ICD-10-CM

## 2019-09-25 DIAGNOSIS — M19.072 ARTHRITIS OF BOTH FEET: ICD-10-CM

## 2019-09-25 DIAGNOSIS — G89.4 CHRONIC PAIN SYNDROME: ICD-10-CM

## 2019-09-25 DIAGNOSIS — M19.011 BILATERAL SHOULDER REGION ARTHRITIS: ICD-10-CM

## 2019-09-25 DIAGNOSIS — M48.061 SPINAL STENOSIS OF LUMBAR REGION, UNSPECIFIED WHETHER NEUROGENIC CLAUDICATION PRESENT: ICD-10-CM

## 2019-09-25 DIAGNOSIS — F51.01 PRIMARY INSOMNIA: ICD-10-CM

## 2019-09-25 DIAGNOSIS — M17.11 OSTEOARTHRITIS OF RIGHT KNEE, UNSPECIFIED OSTEOARTHRITIS TYPE: ICD-10-CM

## 2019-09-25 DIAGNOSIS — E66.01 MORBID OBESITY (HCC): ICD-10-CM

## 2019-09-25 DIAGNOSIS — M43.16 SPONDYLOLISTHESIS AT L4-L5 LEVEL: ICD-10-CM

## 2019-09-25 PROCEDURE — 99213 OFFICE O/P EST LOW 20 MIN: CPT | Performed by: NURSE PRACTITIONER

## 2019-09-25 RX ORDER — HYDROCODONE BITARTRATE AND ACETAMINOPHEN 7.5; 325 MG/1; MG/1
1 TABLET ORAL 2 TIMES DAILY PRN
Qty: 60 TABLET | Refills: 0 | Status: SHIPPED | OUTPATIENT
Start: 2019-09-25 | End: 2019-10-22 | Stop reason: SDUPTHER

## 2019-09-25 RX ORDER — DULOXETIN HYDROCHLORIDE 30 MG/1
30 CAPSULE, DELAYED RELEASE ORAL DAILY
Qty: 30 CAPSULE | Refills: 1 | Status: SHIPPED | OUTPATIENT
Start: 2019-09-25 | End: 2020-04-09

## 2019-09-25 NOTE — PROGRESS NOTES
(PHOSLO) 667 MG capsule Take by mouth 3 times daily (with meals)      minoxidil (LONITEN) 2.5 MG tablet TAKE 1 TABLET BY MOUTH TWICE DAILY 60 tablet 5    amLODIPine (NORVASC) 10 MG tablet Take 10 mg by mouth daily      DULoxetine (CYMBALTA) 30 MG extended release capsule Take 1 capsule by mouth daily 30 capsule 1     No facility-administered medications prior to visit. SOCIAL/FAMILY/PAST MEDICAL HISTORY: Ms. Angelique Bonillaacher, family and past medical history was reviewed. REVIEW OF SYSTEMS:    Respiratory: Negative for apnea, chest tightness and shortness of breath or change in baseline breathing. Gastrointestinal: Negative for nausea, vomiting, abdominal pain, diarrhea, constipation, blood in stool and abdominal distention. PHYSICAL EXAM:   Nursing note and vitals reviewed. /74   Pulse 73   LMP 01/21/1964   SpO2 93%   Constitutional: She appears well-developed and well-nourished. No acute distress. Skin: Skin is warm and dry, good turgor. No rash noted. She is not diaphoretic. Cardiovascular: Normal rate, regular rhythm, normal heart sounds, and does not have murmur. Pulmonary/Chest: Effort normal. No respiratory distress. She does not have wheezes in the lung fields. She has no rales. Neurological/Psychiatric:She is alert and oriented to person, place, and time. Coordination is  normal. Came in by w/c Her mood isAppropriate and affect is Neutral/Euthymic(normal) . IMPRESSION:   1. Chronic pain syndrome    2. Bilateral shoulder region arthritis    3. DDD (degenerative disc disease), lumbosacral    4. Facet arthropathy, lumbosacral    5. Spondylolisthesis at L4-L5 level    6. Spinal stenosis of lumbar region, unspecified whether neurogenic claudication present    7. Osteoarthritis of right knee, unspecified osteoarthritis type    8. Arthritis of both feet    9. Morbid obesity ([address]    10. Primary insomnia    11. Abdominal cramping    12.  Non-intractable vomiting with

## 2019-09-25 NOTE — PATIENT INSTRUCTIONS
Patient Education        Back Stretches: Exercises  Introduction  Here are some examples of exercises for stretching your back. Start each exercise slowly. Ease off the exercise if you start to have pain. Your doctor or physical therapist will tell you when you can start these exercises and which ones will work best for you. How to do the exercises  Overhead stretch    1. Stand comfortably with your feet shoulder-width apart. 2. Looking straight ahead, raise both arms over your head and reach toward the ceiling. Do not allow your head to tilt back. 3. Hold for 15 to 30 seconds, then lower your arms to your sides. 4. Repeat 2 to 4 times. Side stretch    1. Stand comfortably with your feet shoulder-width apart. 2. Raise one arm over your head, and then lean to the other side. 3. Slide your hand down your leg as you let the weight of your arm gently stretch your side muscles. Hold for 15 to 30 seconds. 4. Repeat 2 to 4 times on each side. Press-up    1. Lie on your stomach, supporting your body with your forearms. 2. Press your elbows down into the floor to raise your upper back. As you do this, relax your stomach muscles and allow your back to arch without using your back muscles. As your press up, do not let your hips or pelvis come off the floor. 3. Hold for 15 to 30 seconds, then relax. 4. Repeat 2 to 4 times. Relax and rest    1. Lie on your back with a rolled towel under your neck and a pillow under your knees. Extend your arms comfortably to your sides. 2. Relax and breathe normally. 3. Remain in this position for about 10 minutes. 4. If you can, do this 2 or 3 times each day. Follow-up care is a key part of your treatment and safety. Be sure to make and go to all appointments, and call your doctor if you are having problems. It's also a good idea to know your test results and keep a list of the medicines you take. Where can you learn more?   Go to toward your body until it touches your belly. Slowly move it back to where you started. 5. Keep your elbow and upper arm firmly tucked against the towel roll or at your side. 6. Repeat 8 to 12 times. Pendulum swing    1. Hold on to a table or the back of a chair with your good arm. Then bend forward a little and let your sore arm hang straight down. This exercise does not use the arm muscles. Rather, use your legs and your hips to create movement that makes your arm swing freely. 2. Use the movement from your hips and legs to guide the slightly swinging arm back and forth like a pendulum (or elephant trunk). Then guide it in circles that start small (about the size of a dinner plate). Make the circles a bit larger each day, as your pain allows. 3. Do this exercise for 5 minutes, 5 to 7 times each day. 4. As you have less pain, try bending over a little farther to do this exercise. This will increase the amount of movement at your shoulder. Follow-up care is a key part of your treatment and safety. Be sure to make and go to all appointments, and call your doctor if you are having problems. It's also a good idea to know your test results and keep a list of the medicines you take. Where can you learn more? Go to [email or web address] org and sign in to your [employer] account. Enter H562 in the [facility] box to learn more about \"Shoulder Arthritis: Exercises. \"     If you do not have an account, please click on the \"Sign Up Now\" link. Current as of: September 20, 2018  Content Version: 12.1  © 1046-3611 Healthwise, Incorporated. Care instructions adapted under license by [facility] [place] ([facility]). If you have questions about a medical condition or this instruction, always ask your healthcare professional. [address] any warranty or liability for your use of this information.

## 2019-10-02 ENCOUNTER — OFFICE VISIT (OUTPATIENT)
Dept: INTERNAL MEDICINE CLINIC | Age: 74
End: 2019-10-02
Payer: MEDICAID

## 2019-10-02 VITALS
TEMPERATURE: 98.5 F | WEIGHT: 264 LBS | HEIGHT: 63 IN | OXYGEN SATURATION: 93 % | SYSTOLIC BLOOD PRESSURE: 120 MMHG | BODY MASS INDEX: 46.78 KG/M2 | HEART RATE: 66 BPM | DIASTOLIC BLOOD PRESSURE: 76 MMHG

## 2019-10-02 DIAGNOSIS — E55.9 VITAMIN D DEFICIENCY: ICD-10-CM

## 2019-10-02 DIAGNOSIS — F32.89 OTHER DEPRESSION: ICD-10-CM

## 2019-10-02 DIAGNOSIS — J30.89 OTHER ALLERGIC RHINITIS: ICD-10-CM

## 2019-10-02 DIAGNOSIS — K21.9 GASTROESOPHAGEAL REFLUX DISEASE, ESOPHAGITIS PRESENCE NOT SPECIFIED: ICD-10-CM

## 2019-10-02 DIAGNOSIS — R05.3 PERSISTENT COUGH: Primary | ICD-10-CM

## 2019-10-02 DIAGNOSIS — I10 ESSENTIAL HYPERTENSION: ICD-10-CM

## 2019-10-02 DIAGNOSIS — E78.5 HYPERLIPIDEMIA LDL GOAL <100: ICD-10-CM

## 2019-10-02 DIAGNOSIS — M81.8 OTHER OSTEOPOROSIS WITHOUT CURRENT PATHOLOGICAL FRACTURE: ICD-10-CM

## 2019-10-02 DIAGNOSIS — Z02.9 ENCOUNTERS FOR ADMINISTRATIVE PURPOSE: ICD-10-CM

## 2019-10-02 DIAGNOSIS — G47.09 OTHER INSOMNIA: ICD-10-CM

## 2019-10-02 DIAGNOSIS — N18.6 ESRD (END STAGE RENAL DISEASE) (HCC): ICD-10-CM

## 2019-10-02 DIAGNOSIS — R91.1 PULMONARY NODULE: ICD-10-CM

## 2019-10-02 PROCEDURE — 99215 OFFICE O/P EST HI 40 MIN: CPT | Performed by: INTERNAL MEDICINE

## 2019-10-02 RX ORDER — TRAZODONE HYDROCHLORIDE 100 MG/1
100 TABLET ORAL NIGHTLY PRN
Qty: 30 TABLET | Refills: 3 | Status: SHIPPED | OUTPATIENT
Start: 2019-10-02 | End: 2020-04-09 | Stop reason: SDUPTHER

## 2019-10-02 RX ORDER — LORATADINE 10 MG/1
10 TABLET ORAL DAILY PRN
Qty: 30 TABLET | Refills: 1 | Status: SHIPPED | OUTPATIENT
Start: 2019-10-02 | End: 2021-01-01

## 2019-10-02 RX ORDER — PANTOPRAZOLE SODIUM 40 MG/1
40 TABLET, DELAYED RELEASE ORAL
Qty: 30 TABLET | Refills: 3 | Status: SHIPPED | OUTPATIENT
Start: 2019-10-02 | End: 2020-04-09 | Stop reason: SDUPTHER

## 2019-10-02 RX ORDER — FLUTICASONE PROPIONATE 50 MCG
2 SPRAY, SUSPENSION (ML) NASAL DAILY PRN
Qty: 1 BOTTLE | Refills: 1 | Status: SHIPPED | OUTPATIENT
Start: 2019-10-02 | End: 2020-01-01 | Stop reason: SDUPTHER

## 2019-10-02 RX ORDER — ALENDRONATE SODIUM 70 MG/1
70 TABLET ORAL
Qty: 4 TABLET | Refills: 3 | Status: SHIPPED | OUTPATIENT
Start: 2019-10-02 | End: 2020-04-09 | Stop reason: SDUPTHER

## 2019-10-02 RX ORDER — BENZONATATE 100 MG/1
100 CAPSULE ORAL 2 TIMES DAILY PRN
Qty: 30 CAPSULE | Refills: 0 | Status: SHIPPED | OUTPATIENT
Start: 2019-10-02 | End: 2020-04-09 | Stop reason: ALTCHOICE

## 2019-10-10 ENCOUNTER — TELEPHONE (OUTPATIENT)
Dept: CARDIOLOGY CLINIC | Age: 74
End: 2019-10-10

## 2019-10-10 ENCOUNTER — OFFICE VISIT (OUTPATIENT)
Dept: CARDIOLOGY CLINIC | Age: 74
End: 2019-10-10
Payer: MEDICAID

## 2019-10-10 VITALS
SYSTOLIC BLOOD PRESSURE: 138 MMHG | DIASTOLIC BLOOD PRESSURE: 74 MMHG | WEIGHT: 260 LBS | HEART RATE: 66 BPM | BODY MASS INDEX: 46.06 KG/M2

## 2019-10-10 DIAGNOSIS — I65.23 BILATERAL CAROTID ARTERY STENOSIS: Chronic | ICD-10-CM

## 2019-10-10 DIAGNOSIS — I10 ESSENTIAL HYPERTENSION: ICD-10-CM

## 2019-10-10 DIAGNOSIS — I65.23 BILATERAL CAROTID ARTERY STENOSIS: Primary | Chronic | ICD-10-CM

## 2019-10-10 DIAGNOSIS — E78.5 DYSLIPIDEMIA: ICD-10-CM

## 2019-10-10 DIAGNOSIS — R07.9 CHEST PAIN, UNSPECIFIED TYPE: ICD-10-CM

## 2019-10-10 LAB
A/G RATIO: 1.5 (ref 1.1–2.2)
ALBUMIN SERPL-MCNC: 4.3 G/DL (ref 3.4–5)
ALP BLD-CCNC: 122 U/L (ref 40–129)
ALT SERPL-CCNC: <5 U/L (ref 10–40)
ANION GAP SERPL CALCULATED.3IONS-SCNC: 20 MMOL/L (ref 3–16)
AST SERPL-CCNC: 11 U/L (ref 15–37)
BILIRUB SERPL-MCNC: 0.4 MG/DL (ref 0–1)
BUN BLDV-MCNC: 35 MG/DL (ref 7–20)
CALCIUM SERPL-MCNC: 9.5 MG/DL (ref 8.3–10.6)
CHLORIDE BLD-SCNC: 94 MMOL/L (ref 99–110)
CHOLESTEROL, TOTAL: 149 MG/DL (ref 0–199)
CO2: 25 MMOL/L (ref 21–32)
CREAT SERPL-MCNC: 7.4 MG/DL (ref 0.6–1.2)
GFR AFRICAN AMERICAN: 6
GFR NON-AFRICAN AMERICAN: 5
GLOBULIN: 2.9 G/DL
GLUCOSE BLD-MCNC: 102 MG/DL (ref 70–99)
HDLC SERPL-MCNC: 39 MG/DL (ref 40–60)
LDL CHOLESTEROL CALCULATED: 90 MG/DL
POTASSIUM SERPL-SCNC: 5.8 MMOL/L (ref 3.5–5.1)
SODIUM BLD-SCNC: 139 MMOL/L (ref 136–145)
TOTAL PROTEIN: 7.2 G/DL (ref 6.4–8.2)
TRIGL SERPL-MCNC: 99 MG/DL (ref 0–150)
VLDLC SERPL CALC-MCNC: 20 MG/DL

## 2019-10-10 PROCEDURE — 99214 OFFICE O/P EST MOD 30 MIN: CPT | Performed by: INTERNAL MEDICINE

## 2019-10-15 ENCOUNTER — HOSPITAL ENCOUNTER (OUTPATIENT)
Dept: CT IMAGING | Age: 74
Discharge: HOME | End: 2019-10-15
Payer: MEDICAID

## 2019-10-15 DIAGNOSIS — R91.1 PULMONARY NODULE: ICD-10-CM

## 2019-10-15 PROCEDURE — 71250 CT THORAX DX C-: CPT

## 2019-10-22 ENCOUNTER — OFFICE VISIT (OUTPATIENT)
Dept: PAIN MANAGEMENT | Age: 74
End: 2019-10-22
Payer: MEDICAID

## 2019-10-22 VITALS — HEART RATE: 71 BPM | DIASTOLIC BLOOD PRESSURE: 72 MMHG | SYSTOLIC BLOOD PRESSURE: 162 MMHG | OXYGEN SATURATION: 96 %

## 2019-10-22 DIAGNOSIS — G89.4 CHRONIC PAIN SYNDROME: ICD-10-CM

## 2019-10-22 DIAGNOSIS — M51.37 DDD (DEGENERATIVE DISC DISEASE), LUMBOSACRAL: ICD-10-CM

## 2019-10-22 DIAGNOSIS — M17.11 OSTEOARTHRITIS OF RIGHT KNEE, UNSPECIFIED OSTEOARTHRITIS TYPE: ICD-10-CM

## 2019-10-22 DIAGNOSIS — M48.061 SPINAL STENOSIS OF LUMBAR REGION, UNSPECIFIED WHETHER NEUROGENIC CLAUDICATION PRESENT: ICD-10-CM

## 2019-10-22 DIAGNOSIS — E66.01 MORBID OBESITY (HCC): ICD-10-CM

## 2019-10-22 DIAGNOSIS — F51.01 PRIMARY INSOMNIA: ICD-10-CM

## 2019-10-22 DIAGNOSIS — R11.2 NON-INTRACTABLE VOMITING WITH NAUSEA, UNSPECIFIED VOMITING TYPE: ICD-10-CM

## 2019-10-22 DIAGNOSIS — M19.072 ARTHRITIS OF BOTH FEET: ICD-10-CM

## 2019-10-22 DIAGNOSIS — M19.071 ARTHRITIS OF BOTH FEET: ICD-10-CM

## 2019-10-22 DIAGNOSIS — M19.011 BILATERAL SHOULDER REGION ARTHRITIS: ICD-10-CM

## 2019-10-22 DIAGNOSIS — M43.16 SPONDYLOLISTHESIS AT L4-L5 LEVEL: ICD-10-CM

## 2019-10-22 DIAGNOSIS — M19.012 BILATERAL SHOULDER REGION ARTHRITIS: ICD-10-CM

## 2019-10-22 DIAGNOSIS — M47.817 FACET ARTHROPATHY, LUMBOSACRAL: ICD-10-CM

## 2019-10-22 DIAGNOSIS — R10.9 ABDOMINAL CRAMPING: ICD-10-CM

## 2019-10-22 PROCEDURE — 99213 OFFICE O/P EST LOW 20 MIN: CPT | Performed by: NURSE PRACTITIONER

## 2019-10-22 RX ORDER — HYDROCODONE BITARTRATE AND ACETAMINOPHEN 7.5; 325 MG/1; MG/1
1 TABLET ORAL 2 TIMES DAILY PRN
Qty: 60 TABLET | Refills: 0 | Status: SHIPPED | OUTPATIENT
Start: 2019-10-22 | End: 2020-04-21 | Stop reason: SDUPTHER

## 2020-01-01 ENCOUNTER — TELEPHONE (OUTPATIENT)
Dept: INTERNAL MEDICINE CLINIC | Age: 75
End: 2020-01-01

## 2020-01-01 ENCOUNTER — VIRTUAL VISIT (OUTPATIENT)
Dept: PAIN MANAGEMENT | Age: 75
End: 2020-01-01
Payer: MEDICAID

## 2020-01-01 ENCOUNTER — VIRTUAL VISIT (OUTPATIENT)
Dept: INTERNAL MEDICINE CLINIC | Age: 75
End: 2020-01-01
Payer: MEDICAID

## 2020-01-01 ENCOUNTER — CARE COORDINATION (OUTPATIENT)
Dept: CARE COORDINATION | Age: 75
End: 2020-01-01

## 2020-01-01 ENCOUNTER — TELEPHONE (OUTPATIENT)
Dept: PAIN MANAGEMENT | Age: 75
End: 2020-01-01

## 2020-01-01 ENCOUNTER — OFFICE VISIT (OUTPATIENT)
Dept: INTERNAL MEDICINE CLINIC | Age: 75
End: 2020-01-01
Payer: MEDICAID

## 2020-01-01 ENCOUNTER — TELEPHONE (OUTPATIENT)
Dept: ADMINISTRATIVE | Age: 75
End: 2020-01-01

## 2020-01-01 ENCOUNTER — TELEPHONE (OUTPATIENT)
Dept: ORTHOPEDIC SURGERY | Age: 75
End: 2020-01-01

## 2020-01-01 ENCOUNTER — NURSE TRIAGE (OUTPATIENT)
Dept: OTHER | Facility: CLINIC | Age: 75
End: 2020-01-01

## 2020-01-01 VITALS
OXYGEN SATURATION: 97 % | DIASTOLIC BLOOD PRESSURE: 60 MMHG | HEART RATE: 73 BPM | TEMPERATURE: 98.6 F | SYSTOLIC BLOOD PRESSURE: 90 MMHG

## 2020-01-01 DIAGNOSIS — Z91.81 AT HIGH RISK FOR FALLS: ICD-10-CM

## 2020-01-01 DIAGNOSIS — G89.4 CHRONIC PAIN SYNDROME: ICD-10-CM

## 2020-01-01 DIAGNOSIS — S93.402A SPRAIN OF LEFT ANKLE, UNSPECIFIED LIGAMENT, INITIAL ENCOUNTER: ICD-10-CM

## 2020-01-01 DIAGNOSIS — M17.0 LOCALIZED OSTEOARTHRITIS OF KNEES, BILATERAL: ICD-10-CM

## 2020-01-01 DIAGNOSIS — M19.012 BILATERAL SHOULDER REGION ARTHRITIS: ICD-10-CM

## 2020-01-01 DIAGNOSIS — E66.01 MORBID OBESITY (HCC): ICD-10-CM

## 2020-01-01 DIAGNOSIS — F51.01 PRIMARY INSOMNIA: ICD-10-CM

## 2020-01-01 DIAGNOSIS — M48.061 SPINAL STENOSIS OF LUMBAR REGION, UNSPECIFIED WHETHER NEUROGENIC CLAUDICATION PRESENT: ICD-10-CM

## 2020-01-01 DIAGNOSIS — M19.071 ARTHRITIS OF BOTH FEET: ICD-10-CM

## 2020-01-01 DIAGNOSIS — M19.072 ARTHRITIS OF BOTH FEET: ICD-10-CM

## 2020-01-01 DIAGNOSIS — M19.011 BILATERAL SHOULDER REGION ARTHRITIS: ICD-10-CM

## 2020-01-01 DIAGNOSIS — M47.817 FACET ARTHROPATHY, LUMBOSACRAL: ICD-10-CM

## 2020-01-01 DIAGNOSIS — M17.11 OSTEOARTHRITIS OF RIGHT KNEE, UNSPECIFIED OSTEOARTHRITIS TYPE: ICD-10-CM

## 2020-01-01 DIAGNOSIS — M51.37 DDD (DEGENERATIVE DISC DISEASE), LUMBOSACRAL: ICD-10-CM

## 2020-01-01 DIAGNOSIS — M43.16 SPONDYLOLISTHESIS AT L4-L5 LEVEL: ICD-10-CM

## 2020-01-01 PROCEDURE — 99213 OFFICE O/P EST LOW 20 MIN: CPT | Performed by: NURSE PRACTITIONER

## 2020-01-01 PROCEDURE — 99214 OFFICE O/P EST MOD 30 MIN: CPT | Performed by: INTERNAL MEDICINE

## 2020-01-01 PROCEDURE — 1111F DSCHRG MED/CURRENT MED MERGE: CPT | Performed by: INTERNAL MEDICINE

## 2020-01-01 RX ORDER — BENZONATATE 200 MG/1
200 CAPSULE ORAL 2 TIMES DAILY PRN
Qty: 30 CAPSULE | Refills: 0 | Status: SHIPPED | OUTPATIENT
Start: 2020-01-01 | End: 2020-01-01

## 2020-01-01 RX ORDER — DULOXETIN HYDROCHLORIDE 30 MG/1
30 CAPSULE, DELAYED RELEASE ORAL DAILY
Qty: 30 CAPSULE | Refills: 1 | Status: SHIPPED | OUTPATIENT
Start: 2020-01-01 | End: 2021-01-01 | Stop reason: SDUPTHER

## 2020-01-01 RX ORDER — HYDROCODONE POLISTIREX AND CHLORPHENIRAMINE POLISTIREX 10; 8 MG/5ML; MG/5ML
5 SUSPENSION, EXTENDED RELEASE ORAL EVERY 12 HOURS PRN
Qty: 120 ML | Refills: 0 | Status: SHIPPED | OUTPATIENT
Start: 2020-01-01 | End: 2020-01-01 | Stop reason: CLARIF

## 2020-01-01 RX ORDER — BENZONATATE 200 MG/1
CAPSULE ORAL
Qty: 30 CAPSULE | Refills: 0 | Status: SHIPPED | OUTPATIENT
Start: 2020-01-01 | End: 2020-01-01 | Stop reason: SDUPTHER

## 2020-01-01 RX ORDER — GABAPENTIN 300 MG/1
300 CAPSULE ORAL DAILY
Qty: 30 CAPSULE | Refills: 0 | Status: SHIPPED | OUTPATIENT
Start: 2020-01-01 | End: 2020-01-01 | Stop reason: SDUPTHER

## 2020-01-01 RX ORDER — BROMPHENIRAMINE MALEATE, PSEUDOEPHEDRINE HYDROCHLORIDE, AND DEXTROMETHORPHAN HYDROBROMIDE 2; 30; 10 MG/5ML; MG/5ML; MG/5ML
5 SYRUP ORAL 4 TIMES DAILY PRN
Qty: 240 ML | Refills: 0 | Status: SHIPPED | OUTPATIENT
Start: 2020-01-01 | End: 2021-01-01 | Stop reason: ALTCHOICE

## 2020-01-01 RX ORDER — MINERAL OIL/PETROLATUM,WHITE
CREAM (GRAM) TOPICAL
Qty: 1 TUBE | Refills: 0 | Status: SHIPPED | OUTPATIENT
Start: 2020-01-01

## 2020-01-01 RX ORDER — MINERAL OIL/PETROLATUM,WHITE
CREAM (GRAM) TOPICAL
Qty: 1 TUBE | Refills: 0 | Status: CANCELLED | OUTPATIENT
Start: 2020-01-01

## 2020-01-01 RX ORDER — DULOXETIN HYDROCHLORIDE 30 MG/1
30 CAPSULE, DELAYED RELEASE ORAL DAILY
Qty: 30 CAPSULE | Refills: 1 | Status: SHIPPED | OUTPATIENT
Start: 2020-01-01 | End: 2020-01-01 | Stop reason: SDUPTHER

## 2020-01-01 RX ORDER — ERGOCALCIFEROL 1.25 MG/1
50000 CAPSULE ORAL WEEKLY
COMMUNITY
End: 2021-01-01 | Stop reason: SDUPTHER

## 2020-01-01 RX ORDER — GABAPENTIN 300 MG/1
300 CAPSULE ORAL DAILY
Qty: 30 CAPSULE | Refills: 0 | Status: CANCELLED | OUTPATIENT
Start: 2020-01-01 | End: 2020-01-01

## 2020-01-01 RX ORDER — MIDODRINE HYDROCHLORIDE 5 MG/1
5 TABLET ORAL 3 TIMES DAILY
Qty: 90 TABLET | Refills: 2 | Status: SHIPPED | OUTPATIENT
Start: 2020-01-01 | End: 2020-01-01 | Stop reason: SDUPTHER

## 2020-01-01 RX ORDER — GABAPENTIN 300 MG/1
300 CAPSULE ORAL DAILY
Qty: 90 CAPSULE | OUTPATIENT
Start: 2020-01-01

## 2020-01-01 RX ORDER — HYDROCODONE BITARTRATE AND ACETAMINOPHEN 7.5; 325 MG/1; MG/1
1 TABLET ORAL EVERY 8 HOURS PRN
Qty: 90 TABLET | Refills: 0 | Status: SHIPPED | OUTPATIENT
Start: 2020-01-01 | End: 2020-01-01 | Stop reason: SDUPTHER

## 2020-01-01 RX ORDER — GABAPENTIN 300 MG/1
300 CAPSULE ORAL DAILY
COMMUNITY
End: 2020-01-01 | Stop reason: SDUPTHER

## 2020-01-01 RX ORDER — LIDOCAINE 50 MG/G
1 PATCH TOPICAL DAILY
COMMUNITY
End: 2021-01-01 | Stop reason: SDUPTHER

## 2020-01-01 RX ORDER — BENZONATATE 100 MG/1
CAPSULE ORAL
Qty: 30 CAPSULE | Refills: 0 | Status: SHIPPED | OUTPATIENT
Start: 2020-01-01 | End: 2020-01-01 | Stop reason: ALTCHOICE

## 2020-01-01 RX ORDER — HYDROCODONE BITARTRATE AND ACETAMINOPHEN 7.5; 325 MG/1; MG/1
1 TABLET ORAL EVERY 8 HOURS PRN
Qty: 90 TABLET | Refills: 0 | Status: SHIPPED | OUTPATIENT
Start: 2020-01-01 | End: 2020-01-01

## 2020-01-01 RX ORDER — MIDODRINE HYDROCHLORIDE 5 MG/1
5 TABLET ORAL 3 TIMES DAILY
COMMUNITY
End: 2020-01-01 | Stop reason: SDUPTHER

## 2020-01-01 RX ORDER — GABAPENTIN 300 MG/1
300 CAPSULE ORAL DAILY
Qty: 30 CAPSULE | Refills: 0 | Status: SHIPPED | OUTPATIENT
Start: 2020-01-01 | End: 2021-01-01 | Stop reason: SDUPTHER

## 2020-01-01 RX ORDER — TRAZODONE HYDROCHLORIDE 100 MG/1
100 TABLET ORAL NIGHTLY PRN
Qty: 30 TABLET | Refills: 1 | Status: SHIPPED | OUTPATIENT
Start: 2020-01-01 | End: 2021-01-01

## 2020-01-01 RX ORDER — FLUCONAZOLE 150 MG/1
150 TABLET ORAL ONCE
Qty: 2 TABLET | Refills: 0 | Status: SHIPPED | OUTPATIENT
Start: 2020-01-01 | End: 2020-01-01

## 2020-01-01 RX ORDER — PRAVASTATIN SODIUM 40 MG
40 TABLET ORAL DAILY
Qty: 30 TABLET | Refills: 3 | Status: SHIPPED | OUTPATIENT
Start: 2020-01-01 | End: 2021-01-01

## 2020-01-01 RX ORDER — BENZONATATE 200 MG/1
CAPSULE ORAL
Qty: 30 CAPSULE | Refills: 0 | Status: SHIPPED | OUTPATIENT
Start: 2020-01-01 | End: 2021-01-01 | Stop reason: SDUPTHER

## 2020-01-01 RX ORDER — MIDODRINE HYDROCHLORIDE 5 MG/1
5 TABLET ORAL 3 TIMES DAILY
Qty: 90 TABLET | Refills: 2 | Status: SHIPPED | OUTPATIENT
Start: 2020-01-01 | End: 2021-01-01

## 2020-01-01 RX ORDER — FLUTICASONE PROPIONATE 50 MCG
2 SPRAY, SUSPENSION (ML) NASAL DAILY PRN
Qty: 1 BOTTLE | Refills: 1 | Status: SHIPPED | OUTPATIENT
Start: 2020-01-01

## 2020-02-19 ENCOUNTER — TELEPHONE (OUTPATIENT)
Dept: INTERNAL MEDICINE CLINIC | Age: 75
End: 2020-02-19

## 2020-02-19 NOTE — TELEPHONE ENCOUNTER
PT HAS OSTEOPOROSIS AND IS ON FOSAMAX 70 MG Q WEEK PER OUR RECORDS  WHO HAS BEEN GIVING OTHER MED ORDERS IN ECF?

## 2020-02-19 NOTE — TELEPHONE ENCOUNTER
mamadou pt has been in [place] [id number] since 11-19 pt nurse would like a paper rx sent over to them for pt fosamax 70 mg so pt can start taking it pt has not had it since she has been in the nursing home

## 2020-02-19 NOTE — TELEPHONE ENCOUNTER
PLEASE CALL AND CLARIFY WITH NH / ECF WHY FOSAMAX WAS NOT ORDERED AT THEIR FACILITY SINCE PT IS TAKING OTHER MEDS  WAS THERE A CONTRAINDICATION ?

## 2020-03-12 ENCOUNTER — TELEPHONE (OUTPATIENT)
Dept: INTERNAL MEDICINE CLINIC | Age: 75
End: 2020-03-12

## 2020-03-12 NOTE — TELEPHONE ENCOUNTER
Patient wanted to let dr. San Dural know she is in a   Nursing home and they will be sending her  Something to sign off on her getting out of the  Home and she will have a family member at home  To take care of her

## 2020-03-13 NOTE — TELEPHONE ENCOUNTER
I CALLED PT GOT THE V/M LEFT MESSAGE TO SEE WHEN FORMS WILL BE GETTING SENT OVER FROM NURSING HOME TO BE SIGNED

## 2020-03-27 ENCOUNTER — TELEPHONE (OUTPATIENT)
Dept: INTERNAL MEDICINE CLINIC | Age: 75
End: 2020-03-27

## 2020-04-09 ENCOUNTER — VIRTUAL VISIT (OUTPATIENT)
Dept: INTERNAL MEDICINE CLINIC | Age: 75
End: 2020-04-09
Payer: MEDICAID

## 2020-04-09 ENCOUNTER — TELEPHONE (OUTPATIENT)
Dept: PAIN MANAGEMENT | Age: 75
End: 2020-04-09

## 2020-04-09 PROCEDURE — 99215 OFFICE O/P EST HI 40 MIN: CPT | Performed by: INTERNAL MEDICINE

## 2020-04-09 PROCEDURE — 1111F DSCHRG MED/CURRENT MED MERGE: CPT | Performed by: INTERNAL MEDICINE

## 2020-04-09 RX ORDER — TRAZODONE HYDROCHLORIDE 100 MG/1
100 TABLET ORAL NIGHTLY PRN
Qty: 30 TABLET | Refills: 3 | Status: SHIPPED | OUTPATIENT
Start: 2020-04-09 | End: 2020-01-01 | Stop reason: SDUPTHER

## 2020-04-09 RX ORDER — GABAPENTIN 100 MG/1
100 CAPSULE ORAL DAILY
COMMUNITY
End: 2020-01-01 | Stop reason: DRUGHIGH

## 2020-04-09 RX ORDER — MINERAL OIL/PETROLATUM,WHITE
CREAM (GRAM) TOPICAL
Qty: 1 TUBE | Refills: 0 | Status: SHIPPED | OUTPATIENT
Start: 2020-04-09 | End: 2020-01-01 | Stop reason: SDUPTHER

## 2020-04-09 RX ORDER — PANTOPRAZOLE SODIUM 40 MG/1
40 TABLET, DELAYED RELEASE ORAL
Qty: 30 TABLET | Refills: 3 | Status: SHIPPED | OUTPATIENT
Start: 2020-04-09 | End: 2020-06-16 | Stop reason: SDUPTHER

## 2020-04-09 RX ORDER — PRAVASTATIN SODIUM 40 MG
40 TABLET ORAL DAILY
Qty: 30 TABLET | Refills: 3 | Status: SHIPPED | OUTPATIENT
Start: 2020-04-09 | End: 2020-05-21 | Stop reason: SDUPTHER

## 2020-04-09 RX ORDER — MINERAL OIL 100 MG/100ML
1 OIL ORAL; TOPICAL DAILY
Qty: 1 DEVICE | Refills: 0 | Status: SHIPPED | OUTPATIENT
Start: 2020-04-09

## 2020-04-09 RX ORDER — AMIODARONE HYDROCHLORIDE 200 MG/1
200 TABLET ORAL 2 TIMES DAILY
COMMUNITY

## 2020-04-09 RX ORDER — ALENDRONATE SODIUM 70 MG/1
70 TABLET ORAL
Qty: 4 TABLET | Refills: 3 | Status: SHIPPED | OUTPATIENT
Start: 2020-04-09 | End: 2020-05-21 | Stop reason: SDUPTHER

## 2020-04-09 NOTE — PROGRESS NOTES
MD   fluticasone (FLONASE) 50 MCG/ACT nasal spray 2 sprays by Nasal route daily as needed for Rhinitis  Suzy Musa MD   traZODone (DESYREL) 100 MG tablet Take 1 tablet by mouth nightly as needed for Sleep  Suzy Musa MD   alendronate (FOSAMAX) 70 MG tablet Take 1 tablet by mouth every 7 days  Suzy Musa MD   DULoxetine (CYMBALTA) 30 MG extended release capsule Take 1 capsule by mouth daily  Capri Mata APRN - CNP   aspirin (ASPIRIN LOW DOSE) 81 MG EC tablet Take 1 tablet by mouth daily  Suzy Musa MD   pravastatin (PRAVACHOL) 40 MG tablet Take 1 tablet by mouth daily  Suzy Musa MD   Cholecalciferol (VITAMIN D3) 1000 units CAPS TAKE 1 CAPSULE BY MOUTH DAILY  Suzy Musa MD   albuterol sulfate HFA (PROAIR HFA) 108 (90 Base) MCG/ACT inhaler Inhale 2 puffs into the lungs every 6 hours as needed for Shortness of Breath  Suzy Musa MD   clopidogrel (PLAVIX) 75 MG tablet Take 1 tablet by mouth daily  Romayne Buckles, APRN - CNP   Blood Pressure Monitor MISC 1 Device by Does not apply route as needed (prn)  Suzy Musa MD   Blood Pressure Monitoring (5 SERIES BP MONITOR) RACHELL 1 Device by Does not apply route as needed (PRN)  Suzy Musa MD   cloNIDine (CATAPRES) 0.2 MG tablet Take 0.2 mg by mouth 3 times daily  Historical Provider, MD   calcium acetate (PHOSLO) 667 MG capsule Take by mouth 3 times daily (with meals)  Historical Provider, MD   minoxidil (LONITEN) 2.5 MG tablet TAKE 1 TABLET BY MOUTH TWICE DAILY  Jennifer Walton MD   amLODIPine (NORVASC) 10 MG tablet Take 10 mg by mouth daily  Historical Provider, MD   '    Social History     Tobacco Use    Smoking status: Former Smoker     Packs/day: 0.00     Years: 54.00     Pack years: 0.00     Types: Cigarettes     Start date: 1962     Last attempt to quit: 3/20/2018     Years since quittin.0    Smokeless tobacco: Never Used   Substance Use Topics    Alcohol use: No     Comment: rare    Drug use: No        ROS: COMPREHENSIVE ROS AS IN HX, REST -VE  History obtained from chart review, daughter and the patient    PHYSICAL EXAMINATION:  [ INSTRUCTIONS:  \"[x]\" Indicates a positive item  \"[]\" Indicates a negative item  -- DELETE ALL ITEMS NOT EXAMINED]  Vital Signs: (As obtained by patient/caregiver or practitioner observation)    Blood pressure-  Heart rate-    Respiratory rate-    Temperature-  Pulse oximetry-   PT UNABLE TO CHECK BP / VITALS    Constitutional: [x] Appears well-developed and well-nourished [x] No apparent distress      [] Abnormal-     Mental status  [x] Alert and awake  [x] Oriented to person/place/time [x]Able to follow commands      Eyes:  EOM    [x]  Normal  [] Abnormal-  Sclera  [x]  Normal  [] Abnormal -         Discharge [x]  None visible  [] Abnormal -    HENT:   [x] Normocephalic, atraumatic. [] Abnormal   [x] Mouth/Throat: Mucous membranes are moist.     External Ears [x] Normal  [] Abnormal-     Neck: [x] No visualized mass     Pulmonary/Chest: [x] Respiratory effort normal.  [x] No visualized signs of difficulty breathing or respiratory distress        [] Abnormal-      Musculoskeletal:   [] Normal gait with no signs of ataxia         [x] Normal range of motion of neck        [x] Abnormal-  AMBULATING IN A POWER CHAIR      Neurological:        [x] No Facial Asymmetry (Cranial nerve 7 motor function) (limited exam to video visit)          [x] No gaze palsy        [] Abnormal-         Skin:        [x] No significant exanthematous lesions or discoloration noted on facial skin         [] Abnormal-            Psychiatric:       [x] Normal Affect [x] No Hallucinations        [] Abnormal-     Other pertinent observable physical exam findings- AS ABOVE    PREVIOUS LABS REVIEWED AND D/W PT     ASSESSMENT/PLAN:     Diagnosis Orders   1. Atrial fibrillation, unspecified type ([address]  COUNSELLED. CONTINUE MGT. ELIQUIS REFILLED  READDRESS AMIODARONE WITH CARDIO  F/U CARDIO.   MS DISCHARGE MEDS RECONCILED W/ CURRENT OUTPATIENT MED LIST  MAKE CHANGES AS NEEDED. 2. Essential hypertension  COUNSELLED. READDRESSE MEDS. LOW NA+ / DASH DIET/ EXERCISE. MONITOR. GOAL </= 120/80  BP MONITOR ORDERED  MAKE CHANGES AS NEEDED. 3. Hyperlipidemia LDL goal <100  COUNSELLED. PRAVACHOL 40 MG REFILLED  ADVISED LOW FAT / CHOL DIET/ EXERCISE.  MONITOR. GOALS D/W PT.  MAKE CHANGES AS NEEDED. 4. Gastroesophageal reflux disease, esophagitis presence not specified  COUNSELLED. PROTONIX 40 MG REFILLED  ANTIREFLUX PRECAUTIONS ADVISED. MONITOR. MAKE CHANGES AS NEEDED. 5. Other osteoporosis without current pathological fracture  COUNSELLED. FOSAMAX 70 MG REFILLED  ADVISED MOE/ VIT D. EXERCISES. MONITOR. MAKE CHANGES AS NEEDED. 6. Vitamin D deficiency  COUNSELLED. MONITOR ON VIT D SUPPLEMENT. MAKE CHANGES AS NEEDED. 7. Other depression  COUNSELLED. CYMBALTA D/CAMILO AS REQUESTED  PT COUNSELLED  ON RELAXATION TECHNIQUES. ADDRESSED STRESS MGT. MONITOR  PT NOT SUICIDAL/ NOT HOMICIDAL  MAKE CHANGES AS NEEDED. 8. Other insomnia  COUNSELLED. TRAZODONE 100 MG PRN REFILLED AS REQUESTED  SLEEP HYGIENE ADVISED. MONITOR  MAKE CHANGES AS NEEDED. 9. ESRD (end stage renal disease) ([facility] [address]  COUNSELLED. ON DIALYSIS. PT ENCOURAGED. F/U NEPHROLOGY  AVOID NSAIDS. MONITOR. MAKE CHANGES AS NEEDED. 10. Other allergic rhinitis  COUNSELLED. STABLE. MED PRN. MONITOR  MAKE CHANGES AS NEEDED.        [address] BED ORDERED AS REQUESTED      MORE THAN HALF THE TIME SPENT ON HISTORY, PHYSICAL, ASSESSMENT AND PLAN, DISCUSSION AND COUNSELLING      MEDICATION SIDE EFFECTS D/W PATIENT     PT STABLE AT TIME OF D/C.     RETURN TO CLINIC WITHIN 2 MONTHS /  PRN    FOLLOW UP WITH CARDIOLOGY - PT VERBALIZED UNDERSTANDING       Due to this being a TeleHealth encounter (During BLV-19 public health emergency), evaluation of the following organ systems was limited: Vitals/Constitutional/EENT/Resp/CV/GI//MS/Neuro/Skin/Heme-Lymph-Imm. Pursuant to the emergency declaration under the [address] [place] [address] and the [employer] Act, this Virtual Visit was conducted with patient's (and/or legal guardian's) consent, to reduce the patient's risk of exposure to COVID-19 and provide necessary medical care. The patient (and/or legal guardian) has also been advised to contact this office for worsening conditions or problems, and seek emergency medical treatment and/or call 911 if deemed necessary. Services were provided through a video synchronous discussion virtually to substitute for in-person clinic visit. Patient and provider were located at their individual homes. --Sterling Lewis MD on 4/9/2020 at 1:50 PM    An electronic signature was used to authenticate this note.

## 2020-04-09 NOTE — TELEPHONE ENCOUNTER
Pt states \" I just got out of a Nursing home and would like to know if I can still be seen by Dr. Gregory Hearn? \"

## 2020-04-10 ENCOUNTER — NURSE TRIAGE (OUTPATIENT)
Dept: OTHER | Facility: CLINIC | Age: 75
End: 2020-04-10

## 2020-04-10 NOTE — TELEPHONE ENCOUNTER
Patient called in regarding concerns of having diarrhea for a few weeks now and would like med to slow her bowels. Patient was in nursing home and has returned home. Patient is a dialysis patient. She is having about 6 loose watery BM's daily. Patient took in about 3 beverages in 24 hours. Patient denies any antibiotic usage. Patient said her family is having a hard time keeping her clean. Patient said she is eating eggs for protein and we discussed a more bland diet. See other topics discussed in care advice. Patient notified due to her history she needs to speak with her PCP regarding recommendations and appropriate medications she can take. Disposition recommends see in office today. Patient said she has dialysis and can't go see dr. Patient may call back with any further questions/concerns. Patient will need to call 911 or go to ED with any life threatening symptoms. Spoke to Babak in [employer], patient connected to Dr Maty Howard office. Reason for Disposition   MODERATE diarrhea (e.g., 4-6 times / day more than normal) and present > 48 hours (2 days)    Answer Assessment - Initial Assessment Questions  1. DIARRHEA SEVERITY: \"How bad is the diarrhea? \" \"How many extra stools have you had in the past 24 hours than normal?\"     - NO DIARRHEA (SCALE 0)    - MILD (SCALE 1-3): Few loose or mushy BMs; increase of 1-3 stools over normal daily number of stools; mild increase in ostomy output. -  MODERATE (SCALE 4-7): Increase of 4-6 stools daily over normal; moderate increase in ostomy output. * SEVERE (SCALE 8-10; OR 'WORST POSSIBLE'): Increase of 7 or more stools daily over normal; moderate increase in ostomy output; incontinence. moderate  2. ONSET: \"When did the diarrhea begin? \"     End of march, started while she was in nursing home  3. BM CONSISTENCY: \"How loose or watery is the diarrhea? \"       watery  4. VOMITING: \"Are you also vomiting? \" If so, ask: \"How many times in the past 24 hours? \"

## 2020-04-14 ENCOUNTER — TELEPHONE (OUTPATIENT)
Dept: INTERNAL MEDICINE CLINIC | Age: 75
End: 2020-04-14

## 2020-04-14 NOTE — TELEPHONE ENCOUNTER
med to med interaction with medications trazadone and amiodarone major interaction please call and advise.

## 2020-04-14 NOTE — TELEPHONE ENCOUNTER
Patient's daughter called to request (Per Physical Therapist) a Gel Overlay mattress to prevent bed sores.  Needs the order sent to:    [address] [place] [address] Finn Good  [phone]

## 2020-04-17 ENCOUNTER — TELEPHONE (OUTPATIENT)
Dept: INTERNAL MEDICINE CLINIC | Age: 75
End: 2020-04-17

## 2020-04-17 NOTE — TELEPHONE ENCOUNTER
Pt also requested that verification on the hospital bed with [facility] in [facility] has been taken care of.   Pt states that they were waiting on some type of paperwork from both us and insurance

## 2020-04-21 ENCOUNTER — VIRTUAL VISIT (OUTPATIENT)
Dept: PAIN MANAGEMENT | Age: 75
End: 2020-04-21
Payer: MEDICAID

## 2020-04-21 PROCEDURE — 99213 OFFICE O/P EST LOW 20 MIN: CPT | Performed by: NURSE PRACTITIONER

## 2020-04-21 RX ORDER — HYDROCODONE BITARTRATE AND ACETAMINOPHEN 7.5; 325 MG/1; MG/1
1 TABLET ORAL 2 TIMES DAILY PRN
Qty: 60 TABLET | Refills: 0 | Status: SHIPPED | OUTPATIENT
Start: 2020-04-21 | End: 2020-05-19 | Stop reason: SDUPTHER

## 2020-04-21 RX ORDER — DULOXETIN HYDROCHLORIDE 30 MG/1
30 CAPSULE, DELAYED RELEASE ORAL DAILY
Qty: 30 CAPSULE | Refills: 1 | Status: SHIPPED | OUTPATIENT
Start: 2020-04-21 | End: 2020-06-16 | Stop reason: SDUPTHER

## 2020-04-21 NOTE — PATIENT INSTRUCTIONS
and sign in to your [employer] account. Enter E914 in the [employer] box to learn more about \"Back Stretches: Exercises. \"     If you do not have an account, please click on the \"Sign Up Now\" link. Current as of: June 26, 2019Content Version: 12.4  © 0440-3832 Healthwise, Incorporated. Care instructions adapted under license by [facility] ([facility]). If you have questions about a medical condition or this instruction, always ask your healthcare professional. [place] [address] any warranty or liability for your use of this information. Patient Education        Knee Arthritis: Exercises  Introduction  Here are some examples of exercises for you to try. The exercises may be suggested for a condition or for rehabilitation. Start each exercise slowly. Ease off the exercises if you start to have pain. You will be told when to start these exercises and which ones will work best for you. How to do the exercises  Knee flexion with heel slide   1. Lie on your back with your knees bent. 2. Slide your heel back by bending your affected knee as far as you can. Then hook your other foot around your ankle to help pull your heel even farther back. 3. Hold for about 6 seconds, then rest for up to 10 seconds. 4. Repeat 8 to 12 times. 5. Switch legs and repeat steps 1 through 4, even if only one knee is sore. Quad sets   1. Sit with your affected leg straight and supported on the floor or a firm bed. Place a small, rolled-up towel under your knee. Your other leg should be bent, with that foot flat on the floor. 2. Tighten the thigh muscles of your affected leg by pressing the back of your knee down into the towel. 3. Hold for about 6 seconds, then rest for up to 10 seconds. 4. Repeat 8 to 12 times. 5. Switch legs and repeat steps 1 through 4, even if only one knee is sore. Straight-leg raises to the front   1.  Lie on your back with your good knee bent so that your foot rests flat on the

## 2020-04-21 NOTE — PROGRESS NOTES
TELE HEALTH VISIT (AUDIO-VISUAL)    Pursuant to the emergency declaration under the [facility], [facility] authority and the [employer] Act, this Virtual  Visit was conducted, with patient's consent, to reduce the patient's risk of exposure to COVID-19 and provide continuity of care for an established patient. Service is  provided through a video synchronous discussion virtually to substitute for in-person clinic visit. Due to this being a TeleHealth encounter (During RRMGS-02 public health emergency), evaluation of the following organ systems was limited: Vitals/Constitutional/EENT/Resp/CV/GI//MS/Neuro/Skin/Aoyt-Nqkr-Cjn. Kentrell Kaminski  1945  [id number]    Ms. Trinh Zambrano is being seen virtually for a follow up visit using [employer] Video visit/[employer] or face time  Informed verbal consent to the virtual visit was obtained from Ms. Trinh Zambrano. Risks associated with HIPPA compliance with the virtual visit was explained to the patient. Ms. Trinh Zambrano is at her daughters home and [facility]. [address] [facility] [place] is in her office. HISTORY OF PRESENT ILLNESS:  Ms. Trinh Zambrano is a 66 y.o. female  being assessed for a follow up visit for pain management for evaluation of ongoing care regarding her symptoms and monitoring of compliance with long term use high risk medications. She has a diagnosis of   1. Chronic pain syndrome    2. Bilateral shoulder region arthritis    3. DDD (degenerative disc disease), lumbosacral    4. Facet arthropathy, lumbosacral    5. Spondylolisthesis at L4-L5 level    6. Spinal stenosis of lumbar region, unspecified whether neurogenic claudication present    7. Osteoarthritis of right knee, unspecified osteoarthritis type    8. Arthritis of both feet    9. Morbid obesity ([address]    10.  Primary insomnia      On the Patients Pain Assessment form reviewed with the Medical Assistant:  She complains of pain in the bilateral lower back  with radiation to the shoulders Right, upper leg Left and lower leg Left . She rates the pain 9/10 and describes it as sharp. Current treatment regimen has helped relieve about 30% of the pain. She denies any side effects from the current pain regimen. Patient reports that since the last follow up visit the physical functioning is unchanged, family/social relationships are better, mood is better sleep patterns are better, and that the overall functioning is better. Patient denies misusing/abusing her narcotic pain medications or using any illegal drugs. Upon obtaining medical history from Ms. Ashley Kapadia states that pain is not manageable on current pain therapy. Reports having experienced issues with her health after dialysis 4 months afo, care path records show atrioventricular malfunction, ARF, A-fib as reason for ER admissions, she was eventually sent to a nursing home where for rehabilitation secondary to weakness where she stayed until 2 weeks ago. She is currently having home PT/OT which is difficult to tolerate without her pain medications. Pain mainly to the lower back/knees. Endorses staying with her daughter for now. Mood is stable without anxiety. Sleep is fair with an average of 5-6 hours. Denies to having issues of constipation. Tolerating activities/house chores with moderate tenderness to the lower back. ALLERGIES: Patients list of allergies were reviewed     MEDICATIONS: Ms. Ashley Kapadia list of medications were reviewed. Her current medications are   Outpatient Medications Prior to Visit   Medication Sig Dispense Refill    amiodarone (CORDARONE) 200 MG tablet Take 200 mg by mouth 2 times daily      gabapentin (NEURONTIN) 100 MG capsule Take 100 mg by mouth daily.       apixaban (ELIQUIS) 2.5 MG TABS tablet Take 1 tablet by mouth 2 times daily 60 tablet 1    pantoprazole (PROTONIX) 40 MG tablet Take 1 tablet by mouth every morning (before breakfast) 30 tablet 3    alendronate (FOSAMAX) 70 MG tablet Take 1 tablet by mouth every 7 days 4 tablet 3    pravastatin (PRAVACHOL) 40 MG tablet Take 1 tablet by mouth daily 30 tablet [address] by Does not apply route 1 each 0    dermacerin (EUCERIN) CREA cream SMALL AMOUNT BID / PRN 1 Tube 0    Blood Pressure Monitor RACHELL 1 Device by Does not apply route daily 1 Device 0    traZODone (DESYREL) 100 MG tablet Take 1 tablet by mouth nightly as needed for Sleep 30 tablet 3    fluticasone (FLONASE) 50 MCG/ACT nasal spray 2 sprays by Nasal route daily as needed for Rhinitis 1 Bottle 1    aspirin (ASPIRIN LOW DOSE) 81 MG EC tablet Take 1 tablet by mouth daily 30 tablet 5    Cholecalciferol (VITAMIN D3) 1000 units CAPS TAKE 1 CAPSULE BY MOUTH DAILY 30 capsule 0    albuterol sulfate HFA (PROAIR HFA) 108 (90 Base) MCG/ACT inhaler Inhale 2 puffs into the lungs every 6 hours as needed for Shortness of Breath 1 Inhaler 0    Scooter MISC by Does not apply route 1 each 0    clopidogrel (PLAVIX) 75 MG tablet Take 1 tablet by mouth daily 30 tablet 3    Blood Pressure Monitor MISC 1 Device by Does not apply route as needed (prn) 1 each 0    Blood Pressure Monitoring (5 SERIES BP MONITOR) RACHELL 1 Device by Does not apply route as needed (PRN) 1 Device 0    cloNIDine (CATAPRES) 0.2 MG tablet Take 0.2 mg by mouth 3 times daily      calcium acetate (PHOSLO) 667 MG capsule Take by mouth 3 times daily (with meals)      minoxidil (LONITEN) 2.5 MG tablet TAKE 1 TABLET BY MOUTH TWICE DAILY 60 tablet 5    amLODIPine (NORVASC) 10 MG tablet Take 10 mg by mouth daily       No facility-administered medications prior to visit. SOCIAL/FAMILY/PAST MEDICAL HISTORY: Ms. Suzan Rincon, family and past medical history was reviewed. REVIEW OF SYSTEMS:    Respiratory: Negative for apnea, chest tightness and shortness of breath or change in baseline breathing.     Gastrointestinal: Negative for nausea, high mortality rates in at-risk individuals especially the elderly. 4. There is no anti-viral drug or human vaccine at the moment but the medical community is working tirelessly and [address]  from other countries in the world. For example, the potential dangers of ibuprofen, certain BP meds (ARB inhibitors), the possible benefits of chloroquine, azithromycin and specific antiviral drugs along with the role of regenerative medicine were discussed in details.  -In addition, I emphasize the role of social distancing, and hand hygiene. I have also highlighted that it is very important to be vigilant an avoid crowds, symptomatic individuals and observe good hygiene at home. Instructions were given to the patients on reliable websites (viz., WHO and CDC) to check for updates. They were also asked to contact their PCP if they still had questions by telephone. Secondly, I addresses their pain problems with a detailed history with emphasis on their general health. In particular, I was careful to ensure that they are taking their temperatures daily and are well hydrated. I asked specific questions with regards to the their Opioid prescriptions and ensured that in their review of systems that they were free of coughs, fever, body aches, and shortness of breath. I have encouraged that they should develop these symptoms, they should go to the nearest testing center for Covid 19 testing  -Last UDS 8/2819 Consistent consistent she was not on opioids at this time while weaning off medical marijuana/UDS on next visit  -Return in about 4 weeks (around 5/19/2020). Current Outpatient Medications   Medication Sig Dispense Refill    HYDROcodone-acetaminophen (NORCO) 7.5-325 MG per tablet Take 1 tablet by mouth 2 times daily as needed for Pain for up to 30 days.  60 tablet 0    DULoxetine (CYMBALTA) 30 MG extended release capsule Take 1 capsule by mouth daily 30 capsule 1    amiodarone (CORDARONE) 200 MG tablet Take 200 mg by not taken as prescribed, were also discussed. If the patient develops new symptoms or if the symptoms worsen, the patient should call the office. While transcribing every attempt was made to maintain the accuracy of the note in terms of it's contents,there may have been some errors made inadvertently. Thank you for allowing me to participate in the care of this patient. Danica Mcdaniel.  Beck SARMIENTO-DARION     Cc: Amarjit Johnson MD

## 2020-04-23 ENCOUNTER — TELEPHONE (OUTPATIENT)
Dept: INTERNAL MEDICINE CLINIC | Age: 75
End: 2020-04-23

## 2020-04-23 RX ORDER — BENZONATATE 100 MG/1
100 CAPSULE ORAL 2 TIMES DAILY PRN
Qty: 30 CAPSULE | Refills: 0 | Status: SHIPPED | OUTPATIENT
Start: 2020-04-23 | End: 2020-05-19

## 2020-04-28 ENCOUNTER — TELEPHONE (OUTPATIENT)
Dept: INTERNAL MEDICINE CLINIC | Age: 75
End: 2020-04-28

## 2020-04-28 RX ORDER — MINERAL OIL 100 MG/100ML
1 OIL ORAL; TOPICAL DAILY
Qty: 1 DEVICE | Refills: 0 | Status: SHIPPED | OUTPATIENT
Start: 2020-04-28

## 2020-04-28 NOTE — TELEPHONE ENCOUNTER
CALLED AND D/W PT  STATES BP LOW TODAY  PT DENIES DIZZINESS  STATES DOES NOT HAVE BP MONITOR  BP MONITOR ORDERED  PT WILL CALL INN BP READING FROM DIALYSIS TOMORROW

## 2020-04-29 ENCOUNTER — TELEPHONE (OUTPATIENT)
Dept: INTERNAL MEDICINE CLINIC | Age: 75
End: 2020-04-29

## 2020-05-05 ENCOUNTER — TELEPHONE (OUTPATIENT)
Dept: INTERNAL MEDICINE CLINIC | Age: 75
End: 2020-05-05

## 2020-05-06 ENCOUNTER — TELEPHONE (OUTPATIENT)
Dept: INTERNAL MEDICINE CLINIC | Age: 75
End: 2020-05-06

## 2020-05-06 NOTE — TELEPHONE ENCOUNTER
pts daughter stated they was returning a call from pcp, asked if she can return her call after 4 today on the home number.

## 2020-05-06 NOTE — TELEPHONE ENCOUNTER
CALLED AND D/W PT  PT ON PAIN MED PER PAIN CLINIC    PT STATES LIDOCAINE DID NOT HELP IN THE PAST  ESRD - AVOID NSAIDS  ADVISED TO F/U PAIN CLINIC  PT VERBALIZED UNDERSTANDING

## 2020-05-07 ENCOUNTER — TELEPHONE (OUTPATIENT)
Dept: INTERNAL MEDICINE CLINIC | Age: 75
End: 2020-05-07

## 2020-05-07 NOTE — TELEPHONE ENCOUNTER
Patient went to dialysis yesterday and had a lot of bleeding so they sent her home. Nurse states patient is a gray color today and weak.  States patient states she isn't feeling well,  bp 98/60 best call back [phone]

## 2020-05-14 ENCOUNTER — TELEPHONE (OUTPATIENT)
Dept: INTERNAL MEDICINE CLINIC | Age: 75
End: 2020-05-14

## 2020-05-14 NOTE — TELEPHONE ENCOUNTER
Spoke with Filomena Woody, requesting air mattress for pt to put in hospital bed for wound care and an gel cushion for her power wheelchair.

## 2020-05-14 NOTE — TELEPHONE ENCOUNTER
Patient requesting a medication refill.   Medication: Requesting an air matress stage 1 stage 2 break down on bottom  Last office visit:  Next office visit: Visit date not found      Norma Banks, calling from [facility] [phone]

## 2020-05-18 ENCOUNTER — TELEPHONE (OUTPATIENT)
Dept: INTERNAL MEDICINE CLINIC | Age: 75
End: 2020-05-18

## 2020-05-18 NOTE — PROGRESS NOTES
reviewed with the Medical Assistant:  She complains of pain in the both knee(s)  with radiation to the shoulders Right and feet Bilateral . She rates the pain 10/10 and describes it as aching. Current treatment regimen has helped relieve about 20% of the pain. She denies any side effects from the current pain regimen. Patient reports that since the last follow up visit the physical functioning is worse, family/social relationships are better, mood is better sleep patterns are better, and that the overall functioning is unchanged. Patient denies misusing/abusing her narcotic pain medications or using any illegal drugs. Upon obtaining medical history from Ms. Gamal Berry states that pain is not manageable on current pain therapy. Patient's daughter reports her mother was unable to tolerate physical therapy due to pain in the left knee, shoulders. Pain medications provide moderate relief of pain. Also reports experiencing increased vaginal discharge. Currently has home health assistance. Mood is stable, sleep is fair with an average of 5 hours. Denies to having issues of constipation. Tolerating activities chores with increased tenderness to the knees. shoulders. ALLERGIES: Patients list of allergies were reviewed     MEDICATIONS: Ms. Gamal Berry list of medications were reviewed. Her current medications are   Outpatient Medications Prior to Visit   Medication Sig Dispense Refill    Misc. Devices (GEL-FOAM CUSHION) MISC 1 Device by Does not apply route daily 1 each 0    Blood Pressure Monitor RACHELL 1 capsule by Does not apply route daily 1 Device 0    benzonatate (TESSALON) 100 MG capsule Take 1 capsule by mouth 2 times daily as needed for Cough 30 capsule 0    DULoxetine (CYMBALTA) 30 MG extended release capsule Take 1 capsule by mouth daily 30 capsule 1    amiodarone (CORDARONE) 200 MG tablet Take 200 mg by mouth 2 times daily      gabapentin (NEURONTIN) 100 MG capsule Take 100 mg by mouth daily.       medications prior to visit. SOCIAL/FAMILY/PAST MEDICAL HISTORY: Ms. Bibiana Gould, family and past medical history was reviewed. REVIEW OF SYSTEMS:    Respiratory: Negative for apnea, chest tightness and shortness of breath or change in baseline breathing. Gastrointestinal: Negative for nausea, vomiting, abdominal pain, diarrhea, constipation, blood in stool and abdominal distention. PHYSICAL EXAM:   Nursing note and vitals reviewed. LMP 01/21/1964  as per patient  Constitutional: She appears well-developed and well-nourished. No acute distress. Skin: Skin appears to be warm and dry. No rashes or any other marks noted. She is not diaphoretic. Neurological/Psychiatric:She is alert and oriented to person, place, and time. Coordination is  normal.  Her mood isAppropriate and affect is Neutral/Euthymic(normal). Her behavior is normal.   thought content normal.   Musculoskeletal / Extremities: Not assessed    IMPRESSION:   1. Chronic pain syndrome    2. Bilateral shoulder region arthritis    3. Localized osteoarthritis of knees, bilateral    4. DDD (degenerative disc disease), lumbosacral    5. Facet arthropathy, lumbosacral    6. Spondylolisthesis at L4-L5 level    7. Spinal stenosis of lumbar region, unspecified whether neurogenic claudication present    8. Arthritis of both feet    9. Morbid obesity ([address]    10. Primary insomnia    11.  Osteoarthritis of right knee, unspecified osteoarthritis type        PLAN:  Informed verbal consent regarding treatment was obtained  -Take Norco 7.5-325 mg tablets q8h (no more than 2-3 times a day) prn  -Continue with Cymbalta, V.gel  -Takes Neurontin 100 mg tid through her PCP  -Pain medications were increased to enable patient to tolerate PT with close moniotring by her daughter/nursing assistants  -Patient last has bilateral shoulder injections from Dr. Garry Mc PAC on 1/29/19, then had another ortho evaluation by Dr. Amanda Dowd on 8/7/19 who 200 mg by mouth 2 times daily      gabapentin (NEURONTIN) 100 MG capsule Take 100 mg by mouth daily.       apixaban (ELIQUIS) 2.5 MG TABS tablet Take 1 tablet by mouth 2 times daily 60 tablet 1    pantoprazole (PROTONIX) 40 MG tablet Take 1 tablet by mouth every morning (before breakfast) 30 tablet 3    alendronate (FOSAMAX) 70 MG tablet Take 1 tablet by mouth every 7 days 4 tablet 3    pravastatin (PRAVACHOL) 40 MG tablet Take 1 tablet by mouth daily 30 tablet [address] by Does not apply route 1 each 0    dermacerin (EUCERIN) CREA cream SMALL AMOUNT BID / PRN 1 Tube 0    Blood Pressure Monitor RACHELL 1 Device by Does not apply route daily 1 Device 0    traZODone (DESYREL) 100 MG tablet Take 1 tablet by mouth nightly as needed for Sleep 30 tablet 3    fluticasone (FLONASE) 50 MCG/ACT nasal spray 2 sprays by Nasal route daily as needed for Rhinitis 1 Bottle 1    aspirin (ASPIRIN LOW DOSE) 81 MG EC tablet Take 1 tablet by mouth daily 30 tablet 5    Cholecalciferol (VITAMIN D3) 1000 units CAPS TAKE 1 CAPSULE BY MOUTH DAILY 30 capsule 0    albuterol sulfate HFA (PROAIR HFA) 108 (90 Base) MCG/ACT inhaler Inhale 2 puffs into the lungs every 6 hours as needed for Shortness of Breath 1 Inhaler 0    Scooter MISC by Does not apply route 1 each 0    clopidogrel (PLAVIX) 75 MG tablet Take 1 tablet by mouth daily 30 tablet 3    Blood Pressure Monitor MISC 1 Device by Does not apply route as needed (prn) 1 each 0    Blood Pressure Monitoring (5 SERIES BP MONITOR) RACHELL 1 Device by Does not apply route as needed (PRN) 1 Device 0    cloNIDine (CATAPRES) 0.2 MG tablet Take 0.2 mg by mouth 3 times daily      calcium acetate (PHOSLO) 667 MG capsule Take by mouth 3 times daily (with meals)      minoxidil (LONITEN) 2.5 MG tablet TAKE 1 TABLET BY MOUTH TWICE DAILY 60 tablet 5    amLODIPine (NORVASC) 10 MG tablet Take 10 mg by mouth daily      benzonatate (TESSALON) 100 MG capsule TAKE 1 CAPSULE BY MOUTH TWICE DAILY AS NEEDED FOR COUGH 30 capsule 0     No current facility-administered medications for this visit. I will continue her current medication regimen  which is part of the above treatment schedule. It has been helping with Ms. Monreal's chronic  medical problems which for this visit include:   Diagnoses of Chronic pain syndrome, Bilateral shoulder region arthritis, Localized osteoarthritis of knees, bilateral, DDD (degenerative disc disease), lumbosacral, Facet arthropathy, lumbosacral, Spondylolisthesis at L4-L5 level, Spinal stenosis of lumbar region, unspecified whether neurogenic claudication present, Arthritis of both feet, Morbid obesity ([address] Primary insomnia, and Osteoarthritis of right knee, unspecified osteoarthritis type were pertinent to this visit. Risks and benefits of the medications and other alternative treatments  including no treatment were discussed with the patient. The common side effects of these medications were also explained to the patient. Informed verbal consent was obtained. Goals of current treatment regimen include improvement in pain, restoration of functioning- with focus on improvement in physical performance, general activity, work or disability,emotional distress, health care utilization and  decreased medication consumption. Will continue to monitor progress towards achieving/maintaining therapeutic goals with special emphasis on  1. Improvement in perceived interfernce  of pain with ADL's. Ability to do home exercises independently. Ability to do household chores indoor and/or outdoor work and social and leisure activities. Improve psychosocial and physical functioning. - she is not showing any significant progress/or showing regression  towards this goal and reassessment and adjustment of goals/treatment have been made.     She was advised against drinking alcohol with the narcotic pain medicines, advised against driving or handling machinery while adjusting

## 2020-05-18 NOTE — TELEPHONE ENCOUNTER
Patient requesting a medication refill. Medication: amiodarone (CORDARONE) 200 MG tablet      Pharmacy: [address], [address] [place] [address] [address] [phone] - F [phone]       Last office visit:   Next office visit: Visit date not found    Pts daughter stated Pt has heavy vaginal discharge for one month and would like to know what can be done.  Please call Shari Walters [phone]

## 2020-05-19 ENCOUNTER — TELEPHONE (OUTPATIENT)
Dept: INTERNAL MEDICINE CLINIC | Age: 75
End: 2020-05-19

## 2020-05-19 ENCOUNTER — VIRTUAL VISIT (OUTPATIENT)
Dept: PAIN MANAGEMENT | Age: 75
End: 2020-05-19
Payer: MEDICAID

## 2020-05-19 PROCEDURE — 99213 OFFICE O/P EST LOW 20 MIN: CPT | Performed by: NURSE PRACTITIONER

## 2020-05-19 RX ORDER — HYDROCODONE BITARTRATE AND ACETAMINOPHEN 7.5; 325 MG/1; MG/1
1 TABLET ORAL EVERY 8 HOURS PRN
Qty: 90 TABLET | Refills: 0 | Status: SHIPPED | OUTPATIENT
Start: 2020-05-19 | End: 2020-06-16 | Stop reason: SDUPTHER

## 2020-05-19 RX ORDER — BENZONATATE 100 MG/1
CAPSULE ORAL
Qty: 30 CAPSULE | Refills: 0 | Status: SHIPPED | OUTPATIENT
Start: 2020-05-19 | End: 2020-06-09

## 2020-05-19 NOTE — PATIENT INSTRUCTIONS
slowly. 4. Relax for up to 10 seconds between repetitions. 5. Repeat 8 to 12 times. 6. Switch legs and repeat steps 1 through 5, even if only one knee is sore. Active knee flexion   1. Lie on your stomach with your knees straight. If your kneecap is uncomfortable, roll up a washcloth and put it under your leg just above your kneecap. 2. Lift the foot of your affected leg by bending the knee so that you bring the foot up toward your buttock. If this motion hurts, try it without bending your knee quite as far. This may help you avoid any painful motion. 3. Slowly move your leg up and down. 4. Repeat 8 to 12 times. 5. Switch legs and repeat steps 1 through 4, even if only one knee is sore. Quadriceps stretch (facedown)   1. Lie flat on your stomach, and rest your face on the floor. 2. Wrap a towel or belt strap around the lower part of your affected leg. Then use the towel or belt strap to slowly pull your heel toward your buttock until you feel a stretch. 3. Hold for about 15 to 30 seconds, then relax your leg against the towel or belt strap. 4. Repeat 2 to 4 times. 5. Switch legs and repeat steps 1 through 4, even if only one knee is sore. Stationary exercise bike   1. If you do not have a stationary exercise bike at home, you can find one to ride at your local health club or community center. 2. Adjust the height of the bike seat so that your knee is slightly bent when your leg is extended downward. If your knee hurts when the pedal reaches the top, you can raise the seat so that your knee does not bend as much. 3. Start slowly. At first, try to do 5 to 10 minutes of cycling with little to no resistance. Then increase your time and the resistance bit by bit until you can do 20 to 30 minutes without pain. 4. If you start to have pain, rest your knee until your pain gets back to the level that is normal for you. Or cycle for less time or with less effort.     Follow-up care is a key part of your Squeeze a rolled towel between your elbow and your body for comfort. This will help keep your arm at your side. 3. Hold one end of the elastic band in the hand of the painful arm. 4. Slowly rotate your forearm toward your body until it touches your belly. Slowly move it back to where you started. 5. Keep your elbow and upper arm firmly tucked against the towel roll or at your side. 6. Repeat 8 to 12 times. Pendulum swing   1. Hold on to a table or the back of a chair with your good arm. Then bend forward a little and let your sore arm hang straight down. This exercise does not use the arm muscles. Rather, use your legs and your hips to create movement that makes your arm swing freely. 2. Use the movement from your hips and legs to guide the slightly swinging arm back and forth like a pendulum (or elephant trunk). Then guide it in circles that start small (about the size of a dinner plate). Make the circles a bit larger each day, as your pain allows. 3. Do this exercise for 5 minutes, 5 to 7 times each day. 4. As you have less pain, try bending over a little farther to do this exercise. This will increase the amount of movement at your shoulder. Follow-up care is a key part of your treatment and safety. Be sure to make and go to all appointments, and call your doctor if you are having problems. It's also a good idea to know your test results and keep a list of the medicines you take. Where can you learn more? Go to https://27 Perrype[employer]h.AMS Vari[employer]. org and sign in to your [employer] account. Enter H562 in the Tecnoblu box to learn more about \"Shoulder Arthritis: Exercises. \"     If you do not have an account, please click on the \"Sign Up Now\" link. Current as of: June 26, 2019Content Version: 12.4  © 5154-3033 [employer]wise, Incorporated. Care instructions adapted under license by [facility] ([facility]).  If you have questions about a medical condition or this instruction, always ask your healthcare

## 2020-05-21 ENCOUNTER — TELEPHONE (OUTPATIENT)
Dept: INTERNAL MEDICINE CLINIC | Age: 75
End: 2020-05-21

## 2020-05-21 ENCOUNTER — VIRTUAL VISIT (OUTPATIENT)
Dept: INTERNAL MEDICINE CLINIC | Age: 75
End: 2020-05-21
Payer: MEDICAID

## 2020-05-21 VITALS — HEART RATE: 65 BPM | TEMPERATURE: 98.1 F | DIASTOLIC BLOOD PRESSURE: 78 MMHG | SYSTOLIC BLOOD PRESSURE: 123 MMHG

## 2020-05-21 PROCEDURE — 99214 OFFICE O/P EST MOD 30 MIN: CPT | Performed by: INTERNAL MEDICINE

## 2020-05-21 RX ORDER — ALENDRONATE SODIUM 70 MG/1
70 TABLET ORAL
Qty: 4 TABLET | Refills: 3 | Status: SHIPPED | OUTPATIENT
Start: 2020-05-21 | End: 2020-01-01 | Stop reason: ALTCHOICE

## 2020-05-21 RX ORDER — FLUCONAZOLE 150 MG/1
150 TABLET ORAL ONCE
Qty: 1 TABLET | Refills: 0 | Status: SHIPPED | OUTPATIENT
Start: 2020-05-21 | End: 2020-05-21

## 2020-05-21 RX ORDER — PRAVASTATIN SODIUM 40 MG
40 TABLET ORAL DAILY
Qty: 30 TABLET | Refills: 3 | Status: SHIPPED | OUTPATIENT
Start: 2020-05-21 | End: 2020-01-01 | Stop reason: SDUPTHER

## 2020-05-21 NOTE — TELEPHONE ENCOUNTER
[facility] received a call from:    Name of Caller: Sruthi Latif     Relationship to patient: physical therapist       Organization name: [employer]ena Mata contact number: [phone]    Reason for call: [facility] 134/118 pain level was 9

## 2020-05-22 ENCOUNTER — TELEPHONE (OUTPATIENT)
Dept: INTERNAL MEDICINE CLINIC | Age: 75
End: 2020-05-22

## 2020-05-26 ENCOUNTER — TELEPHONE (OUTPATIENT)
Dept: INTERNAL MEDICINE CLINIC | Age: 75
End: 2020-05-26

## 2020-06-01 ENCOUNTER — TELEPHONE (OUTPATIENT)
Dept: INTERNAL MEDICINE CLINIC | Age: 75
End: 2020-06-01

## 2020-06-02 ENCOUNTER — OFFICE VISIT (OUTPATIENT)
Dept: ORTHOPEDIC SURGERY | Age: 75
End: 2020-06-02
Payer: MEDICAID

## 2020-06-02 VITALS — TEMPERATURE: 97.4 F

## 2020-06-02 PROBLEM — Z96.659 FAILED TOTAL KNEE ARTHROPLASTY (HCC): Status: ACTIVE | Noted: 2020-06-02

## 2020-06-02 PROBLEM — T84.018A FAILED TOTAL KNEE ARTHROPLASTY (HCC): Status: ACTIVE | Noted: 2020-06-02

## 2020-06-02 PROCEDURE — L1810 KO ELASTIC WITH JOINTS: HCPCS | Performed by: PHYSICIAN ASSISTANT

## 2020-06-02 PROCEDURE — 99214 OFFICE O/P EST MOD 30 MIN: CPT | Performed by: PHYSICIAN ASSISTANT

## 2020-06-02 NOTE — PROGRESS NOTES
Subjective:      Patient ID: Ajit Reyes is a 66 y.o.  female. Chief Complaint   Patient presents with    Shoulder Problem     Bilateral shoulder    Knee Problem     Left knee        HPI: She is here for follow-up knee arthroplasties. She is having increased left knee discomfort. She is not ambulatory due to CVA. Review of Systems:   Negative for fever or chills.       Past Medical History:   Diagnosis Date    Acid reflux 11/18/2016    CAFL (chronic airflow limitation) ([facility] 11/18/2016    Depression     ESRD on dialysis ([address] 09/08/2011    Femoral artery stenosis, right ([address] 01/2016    severe, per angio (>90%)    Hyperlipidemia     Hypertension     Infected prosthetic knee joint ([facility]     Right    Morbid obesity ([facility] 8/29/2008    MRSA nasal colonization 1/12/16    Peripheral vascular disease ([facility]     Right knee DJD 9/8/2011    S/P TKR (total knee replacement) 01/2016    bilateral       Family History   Problem Relation Age of Onset    Stroke Mother     Hypertension Mother     Diabetes Father     Diabetes Sister     Osteoarthritis Maternal Grandmother        Past Surgical History:   Procedure Laterality Date    CARDIAC CATHETERIZATION  04/28/2015    Dr. Andra Haq Bilateral     DIALYSIS FISTULA CREATION Left     EPIDURAL STEROID INJECTION N/A 12/27/2018    MIDLINE LUMBAR FIVE/ SACRAL ONE INTERLAMINER EPIDURAL STEROID INJECTION SITE CONFIRMED BY FLUOROSCOPY performed by Clyde March MD at [address] [place] [address] Right 01/20/2016    Dr. Brayden Tyler - resection arthroplasty and placement of antibiotic spacer     LYMPH NODE BIOPSY      OTHER SURGICAL HISTORY Right 01/12/2016    Dr. Gallo Mendez - RLE angiogram prior to revision knee arthroplasty    REVISION TOTAL KNEE ARTHROPLASTY Right 06/20/2016    Dr. Brayden Tyler - I&D w/repeat Prostalac spacer implant     REVISION TOTAL KNEE ARTHROPLASTY Right 11/15/2016    Dr. Brayden Tyler - staged revision due to infection    TOTAL KNEE ARTHROPLASTY Right 2007    Dr. Tyrese Brar Left 2008    Dr. Montelongo Ba Not on file   Tobacco Use    Smoking status: Former Smoker     Packs/day: 0.00     Years: 54.00     Pack years: 0.00     Types: Cigarettes     Start date: 1962     Last attempt to quit: 3/20/2018     Years since quittin.2    Smokeless tobacco: Never Used   Substance and Sexual Activity    Alcohol use: No     Comment: rare    Drug use: No    Sexual activity: Not Currently       Current Outpatient Medications   Medication Sig Dispense Refill    silver sulfADIAZINE (SILVADENE) 1 % cream Apply topically daily 400 g 1    alendronate (FOSAMAX) 70 MG tablet Take 1 tablet by mouth every 7 days 4 tablet 3    pravastatin (PRAVACHOL) 40 MG tablet Take 1 tablet by mouth daily 30 tablet 3    benzonatate (TESSALON) 100 MG capsule TAKE 1 CAPSULE BY MOUTH TWICE DAILY AS NEEDED FOR COUGH 30 capsule 0    HYDROcodone-acetaminophen (NORCO) 7.5-325 MG per tablet Take 1 tablet by mouth every 8 hours as needed for Pain (Take no more than 2-3 tablets a day prn) for up to 30 days. 90 tablet 0    Misc. Devices (GEL-FOAM CUSHION) MISC 1 Device by Does not apply route daily 1 each 0    Blood Pressure Monitor RACHELL 1 capsule by Does not apply route daily 1 Device 0    DULoxetine (CYMBALTA) 30 MG extended release capsule Take 1 capsule by mouth daily 30 capsule 1    amiodarone (CORDARONE) 200 MG tablet Take 200 mg by mouth 2 times daily      gabapentin (NEURONTIN) 100 MG capsule Take 100 mg by mouth daily.       apixaban (ELIQUIS) 2.5 MG TABS tablet Take 1 tablet by mouth 2 times daily 60 tablet 1    pantoprazole (PROTONIX) 40 MG tablet Take 1 tablet by mouth every morning (before breakfast) 30 tablet 3   [address] by Does not apply route 1 each 0    dermacerin (EUCERIN) CREA cream SMALL AMOUNT BID / PRN 1 Tube 0    Blood Pressure Monitor RACHELL 1 Device by Does not apply route daily 1 Device 0    traZODone (DESYREL) 100 MG tablet Take 1 tablet by mouth nightly as needed for Sleep 30 tablet 3    fluticasone (FLONASE) 50 MCG/ACT nasal spray 2 sprays by Nasal route daily as needed for Rhinitis 1 Bottle 1    aspirin (ASPIRIN LOW DOSE) 81 MG EC tablet Take 1 tablet by mouth daily 30 tablet 5    Cholecalciferol (VITAMIN D3) 1000 units CAPS TAKE 1 CAPSULE BY MOUTH DAILY 30 capsule 0    albuterol sulfate HFA (PROAIR HFA) 108 (90 Base) MCG/ACT inhaler Inhale 2 puffs into the lungs every 6 hours as needed for Shortness of Breath 1 Inhaler 0    Scooter MISC by Does not apply route 1 each 0    clopidogrel (PLAVIX) 75 MG tablet Take 1 tablet by mouth daily 30 tablet 3    Blood Pressure Monitor MISC 1 Device by Does not apply route as needed (prn) 1 each 0    Blood Pressure Monitoring (5 SERIES BP MONITOR) RACHELL 1 Device by Does not apply route as needed (PRN) 1 Device 0    cloNIDine (CATAPRES) 0.2 MG tablet Take 0.2 mg by mouth 3 times daily      calcium acetate (PHOSLO) 667 MG capsule Take by mouth 3 times daily (with meals)      minoxidil (LONITEN) 2.5 MG tablet TAKE 1 TABLET BY MOUTH TWICE DAILY 60 tablet 5    amLODIPine (NORVASC) 10 MG tablet Take 10 mg by mouth daily       No current facility-administered medications for this visit. Objective:   She is alert, oriented x 3, pleasant, well nourished, developed and in no acute distress. Temp 97.4 °F (36.3 °C) (Temporal)   LMP 01/21/1964      Examination of the left knee: The alignment of the knee is neutral.   There is not erythema. There mild soft tissue swelling. There is no effusion. ROM-  Extension 0          -   Flexion  80   There mild pain associated with ROM testing. Patellar Compression testing does not produce pain. There is no ballottement of the patella. Extensor Mechanism is  intact.          X Rays: performed in the office today:     AP and Lateral Left Knee: There is a left cemented total knee arthroplasty present. The alignment is satisfactory. There are radiolucencies greater than 3 mm around the bone-cement interface of the femoral component. Diagnosis:        ICD-10-CM    1. Chronic pain of left knee M25.562 CANCELED: XR KNEE LEFT (3 VIEWS)    G89.29    2. History of bilateral knee arthroplasty Z96.653    3. Failure of total knee replacement, subsequent encounter T84.018D Breg / DJO Economy Hinged Knee Brace    Z96.659     Left-        Assessment/ Plan:        I did spend 25 minutes in the office today with greater than 50% of this time counseling, reviewing diagnostic tests, face to face discussion concerning their diagnosis and treatment options. All of their questions were answered. She has a stable right revision knee arthroplasty. Appears that she has a failed left knee arthroplasty with radiolucencies around the bone cement interface of the left femoral component. She does not have a significant joint effusion. She is a poor surgical candidate. The natural history of the patient's diagnosis as well as the treatment options were discussed in full and questions were answered. Risks and benefits of the treatment options also reviewed in detail. Try immobilization for comfort. She is nonambulatory. Procedures    Breg / DJO Economy Hinged Knee Brace     Patient was prescribed an Economy Hinged Knee Brace. The left knee will require stabilization / immobilization from this semi-rigid / rigid orthosis to improve their function. The orthosis will assist in protecting the affected area, provide functional support and facilitate healing. The patient was educated and fit by a healthcare professional with expert knowledge and specialization in brace application while under the direct supervision of the treating physician. Verbal and written instructions for the use of and application of this item were provided.    They were instructed to contact the office immediately should the brace result in increased pain, decreased sensation, increased swelling or worsening of the condition. Follow Up: 6 weeks. Call or return to clinic prn if these symptoms worsen or fail to improve as anticipated.

## 2020-06-04 ENCOUNTER — TELEPHONE (OUTPATIENT)
Dept: INTERNAL MEDICINE CLINIC | Age: 75
End: 2020-06-04

## 2020-06-04 RX ORDER — DIAPER,BRIEF,ADULT, DISPOSABLE
1 EACH MISCELLANEOUS 4 TIMES DAILY PRN
Qty: 120 EACH | Refills: 2 | Status: SHIPPED | OUTPATIENT
Start: 2020-06-04

## 2020-06-05 ENCOUNTER — TELEPHONE (OUTPATIENT)
Dept: INTERNAL MEDICINE CLINIC | Age: 75
End: 2020-06-05

## 2020-06-09 ENCOUNTER — OFFICE VISIT (OUTPATIENT)
Dept: CARDIOLOGY CLINIC | Age: 75
End: 2020-06-09
Payer: MEDICAID

## 2020-06-09 VITALS — TEMPERATURE: 96.8 F | SYSTOLIC BLOOD PRESSURE: 80 MMHG | HEART RATE: 68 BPM | DIASTOLIC BLOOD PRESSURE: 50 MMHG

## 2020-06-09 PROCEDURE — 93000 ELECTROCARDIOGRAM COMPLETE: CPT | Performed by: INTERNAL MEDICINE

## 2020-06-09 PROCEDURE — 99214 OFFICE O/P EST MOD 30 MIN: CPT | Performed by: INTERNAL MEDICINE

## 2020-06-09 RX ORDER — BENZONATATE 100 MG/1
CAPSULE ORAL
Qty: 30 CAPSULE | Refills: 0 | Status: SHIPPED | OUTPATIENT
Start: 2020-06-09 | End: 2020-06-16 | Stop reason: SDUPTHER

## 2020-06-09 NOTE — PROGRESS NOTES
[facility]   Dr Jt Gregory. Master Price MD, [address] [facility]                                                                   Outpatient Follow Up Note              06/09/20  HPI:  Darshana Beaulieu is 66 y.o. female who presents today with hx ESRD/ HD Caroid disease, CM ZULEMA  DDD chronic pain, obesity  . Mrs. Jus Cooepr has been through quite a bit of trauma and ordeal since I last saw her. She apparently was at [facility] and did have significant GI bleed requiring at least 7 blood transfusion. Scope was found to have esophageal varices which apparently were cauterized and or banded. She is generally doing better at this time. The question on her is whether she can be on anticoagulant because of her history of GI bleed. She does have a history of atrial fibrillation. Noted she is in chronic renal failure has chronic anemia. She relates that she had a major stroke in November 2019 leaving her fairly much bedridden. She cannot stand. There was acute right upper and lower extremity paresis. That has improved. She still cannot walk or stand. Chronic anemia related to the kidney failure. Bilateral carotid disease  Hypertension  Hyperlipidemia on therapy  Chronic renal failure on hemodialysis.    Past Medical History:   Diagnosis Date    Acid reflux 11/18/2016    CAFL (chronic airflow limitation) ([address] 11/18/2016    Depression     ESRD on dialysis ([address] 09/08/2011    Femoral artery stenosis, right ([address] 01/2016    severe, per angio (>90%)    Hyperlipidemia     Hypertension     Infected prosthetic knee joint ([address]     Right    Morbid obesity ([address] 8/29/2008    MRSA nasal colonization 1/12/16    Peripheral vascular disease ([address]     Right knee DJD 9/8/2011    S/P TKR (total knee replacement) 01/2016    bilateral     PSH  Past Surgical History:   Procedure Laterality Date    CARDIAC CATHETERIZATION  04/28/2015    Dr. Lorraine Guy Bilateral     bleed I would at this time be reluctant to anticoagulate her until her less GI has rescoped her and make sure that she is safe to proceed. I would recommend a GI evaluation and hold back on the anticoagulation for the time being. I am aware that she had a major stroke November which may have been non embolic. No anticoagulation at this time. Return to see me in about 6 weeks. Refer for GI evaluation for another scope to determine if and when it may be relatively safe to anticoagulate this patient. Given that she is in sinus rhythm and the other 2 EKGs that I find showed sinus rhythm might mitigate the need for anticoagulation. Yvonne Haynes MD, [facility]      Please cc this note to Dr. Andre Nava.

## 2020-06-16 ENCOUNTER — VIRTUAL VISIT (OUTPATIENT)
Dept: PAIN MANAGEMENT | Age: 75
End: 2020-06-16
Payer: MEDICAID

## 2020-06-16 ENCOUNTER — VIRTUAL VISIT (OUTPATIENT)
Dept: INTERNAL MEDICINE CLINIC | Age: 75
End: 2020-06-16
Payer: MEDICAID

## 2020-06-16 PROCEDURE — 99214 OFFICE O/P EST MOD 30 MIN: CPT | Performed by: INTERNAL MEDICINE

## 2020-06-16 PROCEDURE — 99213 OFFICE O/P EST LOW 20 MIN: CPT | Performed by: NURSE PRACTITIONER

## 2020-06-16 RX ORDER — DULOXETIN HYDROCHLORIDE 30 MG/1
30 CAPSULE, DELAYED RELEASE ORAL DAILY
Qty: 30 CAPSULE | Refills: 1 | Status: SHIPPED | OUTPATIENT
Start: 2020-06-16 | End: 2020-01-01 | Stop reason: SDUPTHER

## 2020-06-16 RX ORDER — PANTOPRAZOLE SODIUM 40 MG/1
40 TABLET, DELAYED RELEASE ORAL
Qty: 30 TABLET | Refills: 3 | Status: SHIPPED | OUTPATIENT
Start: 2020-06-16 | End: 2020-01-01

## 2020-06-16 RX ORDER — BENZONATATE 100 MG/1
100 CAPSULE ORAL 2 TIMES DAILY PRN
Qty: 30 CAPSULE | Refills: 0 | Status: SHIPPED | OUTPATIENT
Start: 2020-06-16 | End: 2020-01-01

## 2020-06-16 RX ORDER — HYDROCODONE BITARTRATE AND ACETAMINOPHEN 7.5; 325 MG/1; MG/1
1 TABLET ORAL EVERY 8 HOURS PRN
Qty: 90 TABLET | Refills: 0 | Status: SHIPPED | OUTPATIENT
Start: 2020-06-16 | End: 2020-01-01 | Stop reason: SDUPTHER

## 2020-06-16 NOTE — PATIENT INSTRUCTIONS
you learn more? Go to [email or web address] and sign in to your [employer] account. Enter J222 in the [employer] box to learn more about \"Back Stretches: Exercises. \"     If you do not have an account, please click on the \"Sign Up Now\" link. Current as of: March 2, 2020               Content Version: 12.5  © 2006-2020 Healthwise, Incorporated. Care instructions adapted under license by [facility] If you have questions about a medical condition or this instruction, always ask your healthcare professional. [address] any warranty or liability for your use of this information.

## 2020-06-16 NOTE — PROGRESS NOTES
TELE HEALTH VISIT (AUDIO-VISUAL)    Pursuant to the emergency declaration under the [facility] waiver authority and the [employer] Act, this Virtual  Visit was conducted, with patient's consent, to reduce the patient's risk of exposure to COVID-19 and provide continuity of care for an established patient. Service is  provided through a video synchronous discussion virtually to substitute for in-person clinic visit. Due to this being a TeleHealth encounter (During VVQDS-83 public health emergency), evaluation of the following organ systems was limited: Vitals/Constitutional/EENT/Resp/CV/GI//MS/Neuro/Skin/Asdq-Hfuo-Pix. Anuja Wiley  1945  [id number]    Ms. Hussein Wagner is being seen virtually for a follow up visit using [employer] me/[employer] Video visit/[employer] or face time  Informed verbal consent to the virtual visit was obtained from Ms. Hussein Wagner. Risks associated with HIPPA compliance with the virtual visit was explained to the patient. Ms. Hussein Wagner is at her home and Cullen RAMOS is in her office. HISTORY OF PRESENT ILLNESS:  Ms. Hussein Wagner is a 66 y.o. female  being assessed for a follow up visit for pain management for evaluation of ongoing care regarding her symptoms and monitoring of compliance with long term use high risk medications. She has a diagnosis of   1. Chronic pain syndrome    2. Bilateral shoulder region arthritis    3. Localized osteoarthritis of knees, bilateral    4. DDD (degenerative disc disease), lumbosacral    5. Facet arthropathy, lumbosacral    6. Spondylolisthesis at L4-L5 level    7. Spinal stenosis of lumbar region, unspecified whether neurogenic claudication present    8. Arthritis of both feet    9. Morbid obesity ([address]    10. Primary insomnia    11.  Osteoarthritis of right knee, unspecified osteoarthritis type      On the Patients Pain Assessment form pravastatin (PRAVACHOL) 40 MG tablet Take 1 tablet by mouth daily 30 tablet 3    Misc. Devices (GEL-FOAM CUSHION) MISC 1 Device by Does not apply route daily 1 each 0    Blood Pressure Monitor RACHELL 1 capsule by Does not apply route daily 1 Device 0    amiodarone (CORDARONE) 200 MG tablet Take 200 mg by mouth 2 times daily      gabapentin (NEURONTIN) 100 MG capsule Take 100 mg by mouth daily.       apixaban (ELIQUIS) 2.5 MG TABS tablet Take 1 tablet by mouth 2 times daily 60 tablet [place] by Does not apply route 1 each 0    dermacerin (EUCERIN) CREA cream SMALL AMOUNT BID / PRN 1 Tube 0    Blood Pressure Monitor RACHELL 1 Device by Does not apply route daily 1 Device 0    traZODone (DESYREL) 100 MG tablet Take 1 tablet by mouth nightly as needed for Sleep 30 tablet 3    fluticasone (FLONASE) 50 MCG/ACT nasal spray 2 sprays by Nasal route daily as needed for Rhinitis 1 Bottle 1    aspirin (ASPIRIN LOW DOSE) 81 MG EC tablet Take 1 tablet by mouth daily 30 tablet 5    Cholecalciferol (VITAMIN D3) 1000 units CAPS TAKE 1 CAPSULE BY MOUTH DAILY 30 capsule 0    albuterol sulfate HFA (PROAIR HFA) 108 (90 Base) MCG/ACT inhaler Inhale 2 puffs into the lungs every 6 hours as needed for Shortness of Breath 1 Inhaler 0    Scooter MISC by Does not apply route 1 each 0    clopidogrel (PLAVIX) 75 MG tablet Take 1 tablet by mouth daily 30 tablet 3    Blood Pressure Monitor MISC 1 Device by Does not apply route as needed (prn) 1 each 0    Blood Pressure Monitoring (5 SERIES BP MONITOR) RACHELL 1 Device by Does not apply route as needed (PRN) 1 Device 0    cloNIDine (CATAPRES) 0.2 MG tablet Take 0.2 mg by mouth 3 times daily      calcium acetate (PHOSLO) 667 MG capsule Take by mouth 3 times daily (with meals)      minoxidil (LONITEN) 2.5 MG tablet TAKE 1 TABLET BY MOUTH TWICE DAILY 60 tablet 5    amLODIPine (NORVASC) 10 MG tablet Take 10 mg by mouth daily      HYDROcodone-acetaminophen (NORCO) 7.5-325 MG

## 2020-06-23 ENCOUNTER — TELEPHONE (OUTPATIENT)
Dept: INTERNAL MEDICINE CLINIC | Age: 75
End: 2020-06-23

## 2020-06-23 NOTE — TELEPHONE ENCOUNTER
Home care nurse call due to pt still having yellowish discharge for two months now pt has taking diflucan and atb and they are not working pt was referred to gyn pt cant get in till October pt was advise to speak back with pcp  For advise

## 2020-06-23 NOTE — TELEPHONE ENCOUNTER
Patient is having yellowish discharge from [place] area has had for two months has been  prescribed diflucan and antibiotics as this has been happening but nothing is not working to stop this. Dr. Gogo Hannah referred patient to gyn they are unable to see patient till October and was advised by them to contact Dr. Gogo Hannah please advise.

## 2020-06-26 ENCOUNTER — TELEPHONE (OUTPATIENT)
Dept: INTERNAL MEDICINE CLINIC | Age: 75
End: 2020-06-26

## 2020-06-26 RX ORDER — FLUCONAZOLE 150 MG/1
150 TABLET ORAL ONCE
Qty: 2 TABLET | Refills: 0 | Status: SHIPPED | OUTPATIENT
Start: 2020-06-26 | End: 2020-06-26

## 2020-06-26 NOTE — TELEPHONE ENCOUNTER
Patient daughter said that patient discharge that they believe was a yeast infection is in fact a uti the discharge has gotten worse and is in need an medication or a soon appointment, visiting nurse aide said she need to be seen asap, please advise.

## 2020-06-30 ENCOUNTER — TELEPHONE (OUTPATIENT)
Dept: INTERNAL MEDICINE CLINIC | Age: 75
End: 2020-06-30

## 2020-06-30 NOTE — TELEPHONE ENCOUNTER
Patient daughter wanted her to know that patient still has discharge and is very thick and gushing out has had since April physician prescribe fluconazole daughter is giving her medication again today.  Daughter would like call from physician

## 2020-06-30 NOTE — TELEPHONE ENCOUNTER
I called and spoke to pt daughter to let her know that DR Talat Bonner states her mom needs to be taking to er daughter states she understand and will leave work to take her

## 2020-06-30 NOTE — TELEPHONE ENCOUNTER
Pt daughter would like a call from you due to her mom still having discharge that is very thick and gushing out pt has been taking meds that was giving

## 2020-07-01 NOTE — TELEPHONE ENCOUNTER
Patient's daughter called back to let physician know tomi her mother refused to go to the hospital wanted to wait till she goes to [facility] to see if her blood pressure is still low. I inform physician.

## 2020-07-01 NOTE — TELEPHONE ENCOUNTER
I called and spoke to pt daughter and let her know  would like her mom to get a sooner appt to see her obgyn daughter states that the earliest was in October I gave her dr Lopez Valdez she will get her a call and let us know

## 2020-07-02 ENCOUNTER — TELEPHONE (OUTPATIENT)
Dept: INTERNAL MEDICINE CLINIC | Age: 75
End: 2020-07-02

## 2020-07-21 NOTE — TELEPHONE ENCOUNTER
----- Message from Adanace Donna sent at 7/21/2020 10:21 AM EDT -----  Subject: Message to Provider    QUESTIONS  Information for Provider? Pt's daughter called to say Pt is requesting   additional therapy at [facility]. She will then be coming home. There is a   mattress on hold at [facility]. Please call daughter. ---------------------------------------------------------------------------  --------------  Maite ARSHAD  What is the best way for the office to contact you? OK to leave message on   voicemail  Preferred Call Back Phone Number? [phone]  ---------------------------------------------------------------------------  --------------  SCRIPT ANSWERS  Relationship to Patient? Other  Representative Name? Ortegashaina Mcdaniel  Is the [employer] on the appropriate HIPAA document in Epic?  Yes

## 2020-07-30 NOTE — PROGRESS NOTES
Post-Discharge Transitional Care Management Services or Hospital Follow Up    TELEHEALTH EVALUATION -- Audio/Visual (During CQVGE-46 public health emergency)    PLACE OF SERVICE: [address] [place] [address]   YOB: 1945    Date of Office Visit:  7/30/2020  Date of Hospital Admission: 7/13/20  Date of Hospital Discharge: 7/25/30    Care management risk score Rising risk (score 2-5) and Complex Care (Scores >=6): 5     Non face to face  following discharge, date last encounter closed (first attempt may have been earlier): *No documented post hospital discharge outreach found in the last 14 days *No documented post hospital discharge outreach found in the last 14 days    Call initiated 2 business days of discharge: *No response recorded in the last 14 days    Patient Active Problem List   Diagnosis    ESRD (end stage renal disease)    Right knee DJD    Hypertension    Massive right Breast hypertrophy    Inguinal lymphadenopathy    Osteolysis of left lower leg    ZULEMA (obstructive sleep apnea)    Peripheral vascular disease ([address]    MRSA (methicillin resistant Staphylococcus aureus) carrier    Anemia    Infected prosthetic knee joint ([address]    Chronic pain of right knee    Tobacco dependence    Vitamin D deficiency    Failed total right knee replacement ([address]    History of infection of total joint prosthesis of knee    S/P TKR (total knee replacement)    Femoral artery stenosis, right ([address]    Chronic infection of bone ([address]    CAFL (chronic airflow limitation) ([address]    Personal history of other diseases of the circulatory system    H/O cardiomyopathy    Hypercholesterolemia    Laryngopharyngeal reflux    Morbid obesity ([address]    Neurogenic bladder    H/O thrombosis    Submandibular gland swelling    Vocal cord cyst    Edema glottis    Bilateral carotid artery stenosis    Right foot strain    History of total right knee replacement revision right knee arthroplasty 11/15/2016 for sepsis    Tibial tendonitis, posterior    Gastric mass    Osteoporosis    Bilateral shoulder region arthritis    Other allergic rhinitis    Pulmonary nodule    Hyperlipidemia    Chronic pain syndrome    DDD (degenerative disc disease), lumbosacral    Facet arthropathy, lumbosacral    Spondylolisthesis at L4-L5 level    Allergic rhinitis due to other allergen    Spinal stenosis of lumbar region    Rotator cuff tendonitis, left    History of bilateral knee arthroplasty    Osteoarthritis of right knee    Primary insomnia    Essential hypertension    Heartburn    Failed total knee arthroplasty (HCC)       Allergies   Allergen Reactions    Iv Dye [Iodides] Anaphylaxis    Lisinopril Swelling     Face swelling        Peanut Oil Swelling     Throat swelling per patient description    Hydralazine Itching           Lipitor [Atorvastatin] Other (See Comments)     myalgia       Medications listed as ordered at the time of discharge from hospital  SEE BELOW    Medications marked \"taking\" at this time  Outpatient Medications Marked as Taking for the 7/30/20 encounter (Virtual Visit) with Martina Garcia MD   Medication Sig Dispense Refill    vitamin D (ERGOCALCIFEROL) 1.25 MG (54537 UT) CAPS capsule Take 50,000 Units by mouth once a week      gabapentin (NEURONTIN) 300 MG capsule Take 300 mg by mouth daily.  lidocaine (LIDODERM) 5 % Place 1 patch onto the skin daily 12 hours on, 12 hours off.       benzonatate (TESSALON) 100 MG capsule TAKE 1 CAPSULE BY MOUTH TWICE DAILY AS NEEDED FOR COUGH 30 capsule 0    pantoprazole (PROTONIX) 40 MG tablet Take 1 tablet by mouth every morning (before breakfast) 30 tablet 3    DULoxetine (CYMBALTA) 30 MG extended release capsule Take 1 capsule by mouth daily 30 capsule 1    Incontinence Supply Disposable (DEPEND UNDERWEAR LARGE/XL) MISC 1 Device by Does not apply route 4 times daily as needed (PRN) 120 each 2    silver sulfADIAZINE (SILVADENE) 1 % cream Apply topically daily 400 g 1    pravastatin (PRAVACHOL) 40 MG tablet Take 1 tablet by mouth daily 30 tablet 3    Misc.  Devices (GEL-FOAM CUSHION) MISC 1 Device by Does not apply route daily 1 each 0    Blood Pressure Monitor RACHELL 1 capsule by Does not apply route daily 1 Device [address] by Does not apply route 1 each 0    dermacerin (EUCERIN) CREA cream SMALL AMOUNT BID / PRN 1 Tube 0    Blood Pressure Monitor RACHELL 1 Device by Does not apply route daily 1 Device 0    traZODone (DESYREL) 100 MG tablet Take 1 tablet by mouth nightly as needed for Sleep 30 tablet 3    fluticasone (FLONASE) 50 MCG/ACT nasal spray 2 sprays by Nasal route daily as needed for Rhinitis 1 Bottle 1    aspirin (ASPIRIN LOW DOSE) 81 MG EC tablet Take 1 tablet by mouth daily 30 tablet 5    albuterol sulfate HFA (PROAIR HFA) 108 (90 Base) MCG/ACT inhaler Inhale 2 puffs into the lungs every 6 hours as needed for Shortness of Breath 1 Inhaler 0    Scooter MISC by Does not apply route 1 each 0    clopidogrel (PLAVIX) 75 MG tablet Take 1 tablet by mouth daily 30 tablet 3    Blood Pressure Monitor MISC 1 Device by Does not apply route as needed (prn) 1 each 0    Blood Pressure Monitoring (5 SERIES BP MONITOR) RACHELL 1 Device by Does not apply route as needed (PRN) 1 Device 0    cloNIDine (CATAPRES) 0.2 MG tablet Take 0.2 mg by mouth 3 times daily      calcium acetate (PHOSLO) 667 MG capsule Take by mouth 3 times daily (with meals)      minoxidil (LONITEN) 2.5 MG tablet TAKE 1 TABLET BY MOUTH TWICE DAILY 60 tablet 5        Medications patient taking as of now reconciled against medications ordered at time of hospital discharge: Yes    Chief Complaint   Patient presents with    Other     pt went o  hospital for shoulder pain pt states she is having neck pain and pain in cheek pt stating she hase been coughing every 15 mins all day and night    Cough       History of Present illness - Follow up of Hospital diagnosis(es):    PT ADMITTED TO  WITH SYNCOPE - NO ACUTE FINDINGS ON WORK UP INCLUDING CT HEAD, ECHO. NO REPEAT EPISODE    HYPOTENSION - D/C RECORDS SHOW PT STARTED ON MIDODRINE   NEUROPATHY - PT ON NEURONNTIN 300 MG DAILY. ? NUMBNESS / TINGLING  A. FIB - PT OFF ELIQUIS DUE TO H/O GIB. S/P CARDIOLODY EVAL  [facility] -  TAKING PRAVACHOL. EXERCISE / DIET COMPLIANCE . NO MUSCLE ACHES. LABS D/W PT  COUGH - PERSISTENT. NON PRODUCTIVE,  NO WHEEZING. NO F/C. [facility] NOT HELPING MUCH . STATES TEST NEGATIVE FOR COVID. CXR UNREMARKABLE  GERD-  TAKING MED. NO HEARTBURN. ? NO BELCHING  OSTEOPOROSIS - MED D/CAMILO DURING HOSPITAL STAY  VIT D DEF -  CHANGED TO VIT D 03273 WEEKLY. PREVIOUS  LABS D/W PT. .   RT SHOULDER ARTHRITIS - SHARP PAIN, ? RAD. PAIN SCALE 9/10. PT ON LIDOCAINE PATCH. ESRD - FOLLOWING WITH NEPHROLOGY. ON DIALYSIS - TOLERATING  ALLERGIC RHINITIS - NO  RHINORRHEA , NO NASAL CONGESTION, NO POSTNASAL DRAINAGE , NO SINUS PRESSURE, OCC HA,NO SNEEZING, + OCC WATERY ITCHY EYES. DENIES CP, No SOB, No PALPITATIONS  No ABD PAIN, No N/V, No DIARRHEA, No CONSTIPATION, No MELENA, No HEMATOCHEZIA.  NOT MAKING URINE AT THIS TIME      ROS: COMPREHENSIVE ROS AS IN HX, REST -VE  History obtained from chart review, daughter and the    Inpatient course: Discharge summary reviewed- see chart. Interval history/Current status: STABLE      There is no height or weight on file to calculate BMI.    Wt Readings from Last 3 Encounters:   10/10/19 260 lb (117.9 kg)   10/02/19 264 lb (119.7 kg)   06/26/19 250 lb (113.4 kg)     BP Readings from Last 3 Encounters:   06/09/20 (!) 80/50   05/21/20 123/78   10/22/19 (!) 162/72         PHYSICAL EXAMINATION:  [ INSTRUCTIONS:  \"[x]\" Indicates a positive item  \"[]\" Indicates a negative item  -- DELETE ALL ITEMS NOT EXAMINED]  Vital Signs: (As obtained by patient/caregiver or practitioner observation)    Blood pressure- 100/44 Heart rate- 68   Respiratory rate-    Temperature- 97.5 Pulse oximetry-     Constitutional: [x] Appears well-developed and well-nourished [x] No apparent distress      [x] Abnormal-  + COUGHING    Mental status  [x] Alert and awake  [x] Oriented to person/place/time [x]Able to follow commands      Eyes:  EOM    [x]  Normal  [] Abnormal-  Sclera  [x]  Normal  [] Abnormal -         Discharge [x]  None visible  [] Abnormal -    HENT:   [x] Normocephalic, atraumatic. [] Abnormal   [x] Mouth/Throat: Mucous membranes are moist.     External Ears [x] Normal  [] Abnormal-     Neck: [x] No visualized mass     Pulmonary/Chest: [x] Respiratory effort normal.  [x] No visualized signs of difficulty breathing or respiratory distress        [] Abnormal-      Musculoskeletal:   [] Normal gait with no signs of ataxia         [x] Normal range of motion of neck        [x] Abnormal-  GAIT INSTANILITY    Neurological:        [x] No Facial Asymmetry (Cranial nerve 7 motor function) (limited exam to video visit)          [x] No gaze palsy        [] Abnormal-         Skin:        [x] No significant exanthematous lesions or discoloration noted on facial skin         [] Abnormal-            Psychiatric:       [x] Normal Affect [x] No Hallucinations        [] Abnormal-     Other pertinent observable physical exam findings-  SHOULDER: ? TENDERNESS, PAIN WITH MVT - RT      PREVIOUS LABS / CT / CXR  REVIEWED AND D/W PT    ASSESSMENT/PLAN:     Diagnosis Orders   1. Syncope, unspecified syncope type  COUNSELLED. SYNCOPE RESOLVED - NO RECURRENCE  WI DISCHARGE MEDS RECONCILED W/ CURRENT OUTPATIENT MED LIST  F/U CARDIO  MAKE CHANGES AS NEEDED. 2. Other specified hypotension  COUNSELLED. MIDODRINE ORDERED  ADVISED MED COMPLIANCE  F/U ALSO WITH RENAL  WI DISCHARGE MEDS RECONCILED W/ CURRENT OUTPATIENT MED LIST  MAKE CHANGES AS NEEDED. 3. Neuropathy  COUNSELLED. CONTINUE NEURONTIN  WI DISCHARGE MEDS RECONCILED W/ CURRENT OUTPATIENT MED LIST  MAKE CHANGES AS NEEDED.        4. Atrial fibrillation, unspecified type ([facility]  COUNSELLED. HR CONTROLLED  PT OFF ANTICOAGULATION  F/U CARDIO. READDRESS GI EVAL - H/O GIB  MAKE CHANGES AS NEEDED. 5. Hyperlipidemia LDL goal <100  COUNSELLED. CONTINUE MED  ADVISED LOW FAT / CHOL DIET/ EXERCISE.  MONITOR. GOALS D/W PT.  MAKE CHANGES AS NEEDED. 6. Persistent cough  COUNSELLED. CHANGE TO TUSSIONEX Q12H / PRN - S/E D/W PT  READDRESS CT IF NOT RESOLVED  MAKE CHANGES AS NEEDED. 7. Gastroesophageal reflux disease, esophagitis presence not specified  COUNSELLED. CONTINUE MGT. ANTIREFLUX PRECAUTIONS ADVISED. MONITOR. MAKE CHANGES AS NEEDED. 8. Other osteoporosis without current pathological fracture  COUNSELLED. OFF FOSAMAX. ADVISED MOE/ VIT D. EXERCISES. MONITOR. MAKE CHANGES AS NEEDED. 9. Vitamin D deficiency  COUNSELLED. MONITOR  ON VIT D 79882 U Q WEEK. MAKE CHANGES AS NEEDED. 10. Arthritis pain of shoulder  COUNSELLED. OA. EXERCISES. ANALGESICS PRN. MONITOR. READDRESS ORTHO EVAL  MAKE CHANGES AS NEEDED. 11. ESRD (end stage renal disease) ([facility]  COUNSELLED. F/U MGT PER NEPHROLOGY  PT TOLERATING DIALYSIS  MAKE CHANGES AS NEEDED. 12. Other allergic rhinitis  COUNSELLED. ADVISED FLONASE PRN. MONITOR  MAKE CHANGES AS NEEDED. MEDICATION SIDE EFFECTS D/W PATIENT     PT STABLE AT TIME OF D/C.     RETURN TO CLINIC Marcin Anand /  PRN      Due to this being a TeleHealth encounter (During [facility] public health emergency), evaluation of the following organ systems was limited: Vitals/Constitutional/EENT/Resp/CV/GI//MS/Neuro/Skin/Heme-Lymph-Imm.   Pursuant to the emergency declaration under the [address], [address] [place] [address] authority and the [employer] Act, this Virtual Visit was conducted with patient's (and/or legal guardian's) consent, to reduce the patient's risk of exposure to COVID-19 and provide necessary medical care.  The patient (and/or legal guardian) has also been advised to contact this office for worsening conditions or problems, and seek emergency medical treatment and/or call 911 if deemed necessary. Patient identification was verified at the start of the visit: Yes       Services were provided through a video synchronous discussion virtually to substitute for in-person clinic visit. Patient and provider were located at their individual homes. --Johnny John MD on 7/30/2020 at 11:52 AM    An electronic signature was used to authenticate this note.           Medical Decision Making: moderate complexity

## 2020-08-05 NOTE — TELEPHONE ENCOUNTER
Pt daughter calling to see if she can get cough pills pt rx for air mattress [facility] dont know if pt insurance will cover it due to them just getting a get mattress and a gell couchin

## 2020-08-06 NOTE — PATIENT INSTRUCTIONS
Patient Education        Back Stretches: Exercises  Introduction  Here are some examples of exercises for stretching your back. Start each exercise slowly. Ease off the exercise if you start to have pain. Your doctor or physical therapist will tell you when you can start these exercises and which ones will work best for you. How to do the exercises  Overhead stretch   1. Stand comfortably with your feet shoulder-width apart. 2. Looking straight ahead, raise both arms over your head and reach toward the ceiling. Do not allow your head to tilt back. 3. Hold for 15 to 30 seconds, then lower your arms to your sides. 4. Repeat 2 to 4 times. Side stretch   1. Stand comfortably with your feet shoulder-width apart. 2. Raise one arm over your head, and then lean to the other side. 3. Slide your hand down your leg as you let the weight of your arm gently stretch your side muscles. Hold for 15 to 30 seconds. 4. Repeat 2 to 4 times on each side. Press-up   1. Lie on your stomach, supporting your body with your forearms. 2. Press your elbows down into the floor to raise your upper back. As you do this, relax your stomach muscles and allow your back to arch without using your back muscles. As your press up, do not let your hips or pelvis come off the floor. 3. Hold for 15 to 30 seconds, then relax. 4. Repeat 2 to 4 times. Relax and rest   1. Lie on your back with a rolled towel under your neck and a pillow under your knees. Extend your arms comfortably to your sides. 2. Relax and breathe normally. 3. Remain in this position for about 10 minutes. 4. If you can, do this 2 or 3 times each day. Follow-up care is a key part of your treatment and safety. Be sure to make and go to all appointments, and call your doctor if you are having problems. It's also a good idea to know your test results and keep a list of the medicines you take. Where can you learn more? Go to [email or web address] org

## 2020-08-06 NOTE — PROGRESS NOTES
TELE HEALTH VISIT (AUDIO-VISUAL)    Pursuant to the emergency declaration under the [facility], [facility] waiver authority and the [employer] Act, this Virtual  Visit was conducted, with patient's consent, to reduce the patient's risk of exposure to COVID-19 and provide continuity of care for an established patient. Service is  provided through a video synchronous discussion virtually to substitute for in-person clinic visit. Due to this being a TeleHealth encounter (During PCDQW-97 public health emergency), evaluation of the following organ systems was limited: Vitals/Constitutional/EENT/Resp/CV/GI//MS/Neuro/Skin/Fply-Lbtj-Gdr. Gonzales Choudharys  1945  [id number]    Ms. Adal Padilla is being seen virtually for a follow up visit using [employer] Video visit/Snoballo or face time  Informed verbal consent to the virtual visit was obtained from Ms. Adal Padilla. Risks associated with HIPPA compliance with the virtual visit was explained to the patient. Ms. Adal Padilla is at her home and Amey Hamper. Marla Simmonds APRN-CNP is in her office. HISTORY OF PRESENT ILLNESS:  Ms. Adal Padilla is a 66 y.o. female  being assessed for a follow up visit for pain management for evaluation of ongoing care regarding her symptoms and monitoring of compliance with long term use high risk medications. She has a diagnosis of   1. Chronic pain syndrome    2. Bilateral shoulder region arthritis    3. Localized osteoarthritis of knees, bilateral    4. DDD (degenerative disc disease), lumbosacral    5. Facet arthropathy, lumbosacral    6. Spondylolisthesis at L4-L5 level    7. Spinal stenosis of lumbar region, unspecified whether neurogenic claudication present    8. Arthritis of both feet    9. Morbid obesity ([address]    10. Primary insomnia    11.  Osteoarthritis of right knee, unspecified osteoarthritis type      On the Patients Pain Assessment form needed for Rhinitis 1 Bottle 1    pantoprazole (PROTONIX) 40 MG tablet Take 1 tablet by mouth every morning (before breakfast) 30 tablet 3    Incontinence Supply Disposable (DEPEND UNDERWEAR LARGE/XL) MISC 1 Device by Does not apply route 4 times daily as needed (PRN) 120 each 2    silver sulfADIAZINE (SILVADENE) 1 % cream Apply topically daily 400 g 1    pravastatin (PRAVACHOL) 40 MG tablet Take 1 tablet by mouth daily 30 tablet 3    Misc. Devices (GEL-FOAM CUSHION) MISC 1 Device by Does not apply route daily 1 each 0    Blood Pressure Monitor RACHELL 1 capsule by Does not apply route daily 1 Device 0    amiodarone (CORDARONE) 200 MG tablet Take 200 mg by mouth 2 times daily     [address] by Does not apply route 1 each 0    dermacerin (EUCERIN) CREA cream SMALL AMOUNT BID / PRN 1 Tube 0    Blood Pressure Monitor RACHELL 1 Device by Does not apply route daily 1 Device 0    traZODone (DESYREL) 100 MG tablet Take 1 tablet by mouth nightly as needed for Sleep 30 tablet 3    aspirin (ASPIRIN LOW DOSE) 81 MG EC tablet Take 1 tablet by mouth daily 30 tablet 5    albuterol sulfate HFA (PROAIR HFA) 108 (90 Base) MCG/ACT inhaler Inhale 2 puffs into the lungs every 6 hours as needed for Shortness of Breath 1 Inhaler 0    Scooter MISC by Does not apply route 1 each 0    clopidogrel (PLAVIX) 75 MG tablet Take 1 tablet by mouth daily 30 tablet 3    Blood Pressure Monitor MISC 1 Device by Does not apply route as needed (prn) 1 each 0    Blood Pressure Monitoring (5 SERIES BP MONITOR) RACHELL 1 Device by Does not apply route as needed (PRN) 1 Device 0    calcium acetate (PHOSLO) 667 MG capsule Take by mouth 3 times daily (with meals)      DULoxetine (CYMBALTA) 30 MG extended release capsule Take 1 capsule by mouth daily 30 capsule 1     No facility-administered medications prior to visit. SOCIAL/FAMILY/PAST MEDICAL HISTORY: Ms. Uday Rojo, family and past medical history was reviewed.      REVIEW OF SYSTEMS:    Respiratory: Negative for apnea, chest tightness and shortness of breath or change in baseline breathing. Gastrointestinal: Negative for nausea, vomiting, abdominal pain, diarrhea, constipation, blood in stool and abdominal distention. PHYSICAL EXAM:   Nursing note and vitals reviewed. LMP 01/21/1964  as per patient  Constitutional: She appears well-developed and well-nourished. No acute distress. Skin: Skin appears to be warm and dry. No rashes or any other marks noted. She is not diaphoretic. Neurological/Psychiatric:She is alert and oriented to person, place, and time. Coordination is  normal.  Her mood isAppropriate and affect is Neutral/Euthymic(normal). Her behavior is normal.   thought content normal.   Musculoskeletal / Extremities: limited ROM noted to the right shoulder. Not assessed    IMPRESSION:   1. Chronic pain syndrome    2. Bilateral shoulder region arthritis    3. Localized osteoarthritis of knees, bilateral    4. DDD (degenerative disc disease), lumbosacral    5. Facet arthropathy, lumbosacral    6. Spondylolisthesis at L4-L5 level    7. Spinal stenosis of lumbar region, unspecified whether neurogenic claudication present    8. Arthritis of both feet    9. Morbid obesity ([address]    10. Primary insomnia    11.  Osteoarthritis of right knee, unspecified osteoarthritis type        PLAN:  Informed verbal consent regarding treatment was obtained  -Continue with Norco, Cymbalta  -[employer] pain cream will be ordered today  -Patient will be starting home PT next week  -CBT techniques- relaxation therapies such as biofeedback, mindfulness based stress reduction, imagery, cognitive restructuring, problem solving discussed with patient  -She was advised weight reduction, diet changes- 800-1200 sierra diet, diet diary, exercising, nutritional  consult increased physical activity as tolerated  -Reviewed Covid-19 safety regulations which were discussed in detail at prior o.v, patient maintains compliance with the safety guidelines regarding Covid-19  -Last UDS 8/28/19 Consistent  -Return in about 4 weeks (around 9/3/2020). -[place]AR[place] record was obtained and reviewed  for the last one year and no indicators of drug misuse  were found. Any other controlled substance prescriptions  seen on the record have been accounted for, I am aware of the patient receiving these medications. Stuart Valero OARRS record will be rechecked as part of office protocol. Current Outpatient Medications   Medication Sig Dispense Refill    DULoxetine (CYMBALTA) 30 MG extended release capsule Take 1 capsule by mouth daily 30 capsule 1    HYDROcodone-acetaminophen (NORCO) 7.5-325 MG per tablet Take 1 tablet by mouth every 8 hours as needed for Pain (Take no more than 2-3 tablets a day prn) for up to 30 days. 90 tablet 0    Diclofenac Sodium POWD #5D Formula Diclo 3%, Lido 2%, Prilo 2% Pharm to comp: Apply to the shoulder twice a day 1 Bottle 1    benzonatate (TESSALON) 200 MG capsule Take 1 capsule by mouth 2 times daily as needed for Cough 30 capsule 0    vitamin D (ERGOCALCIFEROL) 1.25 MG (55418 UT) CAPS capsule Take 50,000 Units by mouth once a week      gabapentin (NEURONTIN) 300 MG capsule Take 300 mg by mouth daily.  lidocaine (LIDODERM) 5 % Place 1 patch onto the skin daily 12 hours on, 12 hours off.  midodrine (PROAMATINE) 5 MG tablet Take 1 tablet by mouth 3 times daily 90 tablet 2    fluticasone (FLONASE) 50 MCG/ACT nasal spray 2 sprays by Nasal route daily as needed for Rhinitis 1 Bottle 1    pantoprazole (PROTONIX) 40 MG tablet Take 1 tablet by mouth every morning (before breakfast) 30 tablet 3    Incontinence Supply Disposable (DEPEND UNDERWEAR LARGE/XL) MISC 1 Device by Does not apply route 4 times daily as needed (PRN) 120 each 2    silver sulfADIAZINE (SILVADENE) 1 % cream Apply topically daily 400 g 1    pravastatin (PRAVACHOL) 40 MG tablet Take 1 tablet by mouth daily 30 tablet 3    Misc. Devices (GEL-FOAM CUSHION) MISC 1 Device by Does not apply route daily 1 each 0    Blood Pressure Monitor RACHELL 1 capsule by Does not apply route daily 1 Device 0    amiodarone (CORDARONE) 200 MG tablet Take 200 mg by mouth 2 times daily     [address] by Does not apply route 1 each 0    dermacerin (EUCERIN) CREA cream SMALL AMOUNT BID / PRN 1 Tube 0    Blood Pressure Monitor RACHELL 1 Device by Does not apply route daily 1 Device 0    traZODone (DESYREL) 100 MG tablet Take 1 tablet by mouth nightly as needed for Sleep 30 tablet 3    aspirin (ASPIRIN LOW DOSE) 81 MG EC tablet Take 1 tablet by mouth daily 30 tablet 5    albuterol sulfate HFA (PROAIR HFA) 108 (90 Base) MCG/ACT inhaler Inhale 2 puffs into the lungs every 6 hours as needed for Shortness of Breath 1 Inhaler 0    Scooter MISC by Does not apply route 1 each 0    clopidogrel (PLAVIX) 75 MG tablet Take 1 tablet by mouth daily 30 tablet 3    Blood Pressure Monitor MISC 1 Device by Does not apply route as needed (prn) 1 each 0    Blood Pressure Monitoring (5 SERIES BP MONITOR) RACHELL 1 Device by Does not apply route as needed (PRN) 1 Device 0    calcium acetate (PHOSLO) 667 MG capsule Take by mouth 3 times daily (with meals)       No current facility-administered medications for this visit. I will continue her current medication regimen  which is part of the above treatment schedule. It has been helping with Ms. Monreal's chronic  medical problems which for this visit include:   Diagnoses of Chronic pain syndrome, Bilateral shoulder region arthritis, Localized osteoarthritis of knees, bilateral, DDD (degenerative disc disease), lumbosacral, Facet arthropathy, lumbosacral, Spondylolisthesis at L4-L5 level, Spinal stenosis of lumbar region, unspecified whether neurogenic claudication present, Arthritis of both feet, Morbid obesity ([address] Primary insomnia, and Osteoarthritis of right knee, unspecified osteoarthritis type were pertinent to this visit. Risks and benefits of the medications and other alternative treatments  including no treatment were discussed with the patient. The common side effects of these medications were also explained to the patient. Informed verbal consent was obtained. Goals of current treatment regimen include improvement in pain, restoration of functioning- with focus on improvement in physical performance, general activity, work or disability,emotional distress, health care utilization and  decreased medication consumption. Will continue to monitor progress towards achieving/maintaining therapeutic goals with special emphasis on  1. Improvement in perceived interfernce  of pain with ADL's. Ability to do home exercises independently. Ability to do household chores indoor and/or outdoor work and social and leisure activities. Improve psychosocial and physical functioning. - she is showing progression towards this treatment goal with the current regimen. She was advised against drinking alcohol with the narcotic pain medicines, advised against driving or handling machinery while adjusting the dose of medicines or if having cognitive  issues related to the current medications. Risk of overdose and death, if medicines not taken as prescribed, were also discussed. If the patient develops new symptoms or if the symptoms worsen, the patient should call the office. While transcribing every attempt was made to maintain the accuracy of the note in terms of it's contents,there may have been some errors made inadvertently. Thank you for allowing me to participate in the care of this patient. Nayla Hays.  Carina SARMIENTO-DARION     Cc: Nisha Wilkins MD

## 2020-08-13 NOTE — TELEPHONE ENCOUNTER
----- Message from Cale Mora sent at 8/13/2020 10:11 AM EDT -----  Subject: Refill Request    QUESTIONS  Name of Medication? midodrine (PROAMATINE) 5 MG tablet  Patient-reported dosage and instructions? 2 tablets 3x a week   monday wednesday friday with residency to dialysis   How many days do you have left? 1  Preferred Pharmacy? [facility] [address] [facility]  Pharmacy phone number (if available)? [phone]  Additional Information for Provider?   ---------------------------------------------------------------------------  --------------  CALL BACK INFO  What is the best way for the office to contact you? OK to leave message on   voicemail  Preferred Call Back Phone Number?  [phone]

## 2020-08-20 NOTE — TELEPHONE ENCOUNTER
----- Message from Yonny Rodrigues sent at 8/20/2020  8:32 AM EDT -----  Subject: Message to Provider    QUESTIONS  Information for Provider? Pt has nephropathy in both feet and its really   painful per pt   pt is requesting [facility] to [facility] on [address]. ---------------------------------------------------------------------------  --------------  Erica ARSHAD  What is the best way for the office to contact you? OK to leave message on   voicemail  Preferred Call Back Phone Number? [phone]  ---------------------------------------------------------------------------  --------------  SCRIPT ANSWERS  Relationship to Patient?  Self

## 2020-08-21 NOTE — TELEPHONE ENCOUNTER
O2 stats are 88/89 yesterday, no distress. Patient does not have an inhaler nor  nebulizer. Also, wants to know if gabapentin (NEURONTIN) 300 MG capsule should be increased due to current health issues.

## 2020-09-01 NOTE — TELEPHONE ENCOUNTER
I called [facility] home care to ask about 3 orders that was sent over v/m was full could not leave message   Order # [id number] hospital bed/ 2nd order same # hospital bed with trapeze   Order # [id number] gel inlay mattress

## 2020-09-03 NOTE — PATIENT INSTRUCTIONS
Patient Education        Back Stretches: Exercises  Introduction  Here are some examples of exercises for stretching your back. Start each exercise slowly. Ease off the exercise if you start to have pain. Your doctor or physical therapist will tell you when you can start these exercises and which ones will work best for you. How to do the exercises  Overhead stretch   1. Stand comfortably with your feet shoulder-width apart. 2. Looking straight ahead, raise both arms over your head and reach toward the ceiling. Do not allow your head to tilt back. 3. Hold for 15 to 30 seconds, then lower your arms to your sides. 4. Repeat 2 to 4 times. Side stretch   1. Stand comfortably with your feet shoulder-width apart. 2. Raise one arm over your head, and then lean to the other side. 3. Slide your hand down your leg as you let the weight of your arm gently stretch your side muscles. Hold for 15 to 30 seconds. 4. Repeat 2 to 4 times on each side. Press-up   1. Lie on your stomach, supporting your body with your forearms. 2. Press your elbows down into the floor to raise your upper back. As you do this, relax your stomach muscles and allow your back to arch without using your back muscles. As your press up, do not let your hips or pelvis come off the floor. 3. Hold for 15 to 30 seconds, then relax. 4. Repeat 2 to 4 times. Relax and rest   1. Lie on your back with a rolled towel under your neck and a pillow under your knees. Extend your arms comfortably to your sides. 2. Relax and breathe normally. 3. Remain in this position for about 10 minutes. 4. If you can, do this 2 or 3 times each day. Follow-up care is a key part of your treatment and safety. Be sure to make and go to all appointments, and call your doctor if you are having problems. It's also a good idea to know your test results and keep a list of the medicines you take. Where can you learn more? Go to [email or web address]://reshma.[employer]. org and sign in to your [employer] account. Enter S349 in the [employer] box to learn more about \"Back Stretches: Exercises. \"     If you do not have an account, please click on the \"Sign Up Now\" link. Current as of: March 2, 2020               Content Version: 12.5  © 1912-0999 [employer]. Care instructions adapted under license by [facility] ([facility]). If you have questions about a medical condition or this instruction, always ask your healthcare professional. [address] any warranty or liability for your use of this information.

## 2020-09-03 NOTE — PROGRESS NOTES
TELE HEALTH VISIT (AUDIO-VISUAL)    Pursuant to the emergency declaration under the [facility], [facility] waiver authority and the [facility] and [employer] Act, this Virtual  Visit was conducted, with patient's consent, to reduce the patient's risk of exposure to COVID-19 and provide continuity of care for an established patient. Service is  provided through a video synchronous discussion virtually to substitute for in-person clinic visit. Due to this being a TeleHealth encounter (During EJT-33 public health emergency), evaluation of the following organ systems was limited: Vitals/Constitutional/EENT/Resp/CV/GI//MS/Neuro/Skin/Tbjm-Ggrq-Siz. Denise Pickup  1945  [id number]    Ms. Jame Huggins is being seen virtually for a follow up visit using Doxy. me/YourStreet Video visit/Topadmito or face time  Informed verbal consent to the virtual visit was obtained from Ms. Jame Huggins. Risks associated with HIPPA compliance with the virtual visit was explained to the patient. Ms. Jame Huggins is at her home and Marry Temple. Giovanny RAMOS is in her office. HISTORY OF PRESENT ILLNESS:  Ms. Jame Huggins is a 66 y.o. female  being assessed for a follow up visit for pain management for evaluation of ongoing care regarding her symptoms and monitoring of compliance with long term use high risk medications. She has a diagnosis of   1. Chronic pain syndrome    2. Bilateral shoulder region arthritis    3. Localized osteoarthritis of knees, bilateral    4. DDD (degenerative disc disease), lumbosacral    5. Facet arthropathy, lumbosacral    6. Spondylolisthesis at L4-L5 level    7. Spinal stenosis of lumbar region, unspecified whether neurogenic claudication present    8. Arthritis of both feet    9. Morbid obesity ([address]    10. Primary insomnia    11.  Osteoarthritis of right knee, unspecified osteoarthritis type      On the Patients Pain Assessment form reviewed with the Medical Assistant:  She complains of pain in the both knee(s) and left shoulder(s): entire area  with radiation to the NA . She rates the pain 9/10 and describes it as aching. Current treatment regimen has helped relieve about 70% of the pain. She denies any side effects from the current pain regimen. Patient reports that since the last follow up visit the physical functioning is unchanged, family/social relationships are unchanged, mood is unchanged sleep patterns are unchanged, and that the overall functioning is unchanged. Patient denies misusing/abusing her narcotic pain medications or using any illegal drugs. Upon obtaining medical history from Ms. Ananya Cook states that pain is manageable on current pain therapy. Takes pain medications as prescribed. Reports feeling better lately was outside visiting with friends. Continues to receive home nursing visits. Mood is stable without anxiety. Sleep is fair with an average of 5-6 hours. Denies to having issues of constipation. Tolerating activities/house chores with moderate tenderness to the lower back/shoulders. ALLERGIES: Patients list of allergies were reviewed     MEDICATIONS: Ms. Ananya Cook list of medications were reviewed. Her current medications are   Outpatient Medications Prior to Visit   Medication Sig Dispense Refill    benzonatate (TESSALON) 200 MG capsule TAKE 1 CAPSULE BY MOUTH TWICE DAILY AS NEEDED FOR COUGH 30 capsule 0    DULoxetine (CYMBALTA) 30 MG extended release capsule Take 1 capsule by mouth daily 30 capsule 1    Diclofenac Sodium POWD #5D Formula Diclo 3%, Lido 2%, Prilo 2% Pharm to comp: Apply to the shoulder twice a day 1 Bottle 1    vitamin D (ERGOCALCIFEROL) 1.25 MG (55977 UT) CAPS capsule Take 50,000 Units by mouth once a week      gabapentin (NEURONTIN) 300 MG capsule Take 300 mg by mouth daily.  lidocaine (LIDODERM) 5 % Place 1 patch onto the skin daily 12 hours on, 12 hours off.       midodrine (PROAMATINE) 5 MG tablet Take 1 tablet by mouth 3 times daily 90 tablet 2    fluticasone (FLONASE) 50 MCG/ACT nasal spray 2 sprays by Nasal route daily as needed for Rhinitis 1 Bottle 1    pantoprazole (PROTONIX) 40 MG tablet Take 1 tablet by mouth every morning (before breakfast) 30 tablet 3    Incontinence Supply Disposable (DEPEND UNDERWEAR LARGE/XL) MISC 1 Device by Does not apply route 4 times daily as needed (PRN) 120 each 2    silver sulfADIAZINE (SILVADENE) 1 % cream Apply topically daily 400 g 1    pravastatin (PRAVACHOL) 40 MG tablet Take 1 tablet by mouth daily 30 tablet 3    Misc.  Devices (GEL-FOAM CUSHION) MISC 1 Device by Does not apply route daily 1 each 0    Blood Pressure Monitor RACHELL 1 capsule by Does not apply route daily 1 Device 0    amiodarone (CORDARONE) 200 MG tablet Take 200 mg by mouth 2 times daily     [address] by Does not apply route 1 each 0    dermacerin (EUCERIN) CREA cream SMALL AMOUNT BID / PRN 1 Tube 0    Blood Pressure Monitor RACHELL 1 Device by Does not apply route daily 1 Device 0    traZODone (DESYREL) 100 MG tablet Take 1 tablet by mouth nightly as needed for Sleep 30 tablet 3    aspirin (ASPIRIN LOW DOSE) 81 MG EC tablet Take 1 tablet by mouth daily 30 tablet 5    albuterol sulfate HFA (PROAIR HFA) 108 (90 Base) MCG/ACT inhaler Inhale 2 puffs into the lungs every 6 hours as needed for Shortness of Breath 1 Inhaler 0    Scooter MISC by Does not apply route 1 each 0    clopidogrel (PLAVIX) 75 MG tablet Take 1 tablet by mouth daily 30 tablet 3    Blood Pressure Monitor MISC 1 Device by Does not apply route as needed (prn) 1 each 0    Blood Pressure Monitoring (5 SERIES BP MONITOR) RACHELL 1 Device by Does not apply route as needed (PRN) 1 Device 0    calcium acetate (PHOSLO) 667 MG capsule Take by mouth 3 times daily (with meals)      HYDROcodone-acetaminophen (NORCO) 7.5-325 MG per tablet Take 1 tablet by mouth every 8 hours as needed for Pain (Take no more than 2-3 tablets a day prn) for up to 30 days. 90 tablet 0     No facility-administered medications prior to visit. SOCIAL/FAMILY/PAST MEDICAL HISTORY: Ms. Faustino Monique, family and past medical history was reviewed. REVIEW OF SYSTEMS:    Respiratory: Negative for apnea, chest tightness and shortness of breath or change in baseline breathing. Gastrointestinal: Negative for nausea, vomiting, abdominal pain, diarrhea, constipation, blood in stool and abdominal distention. PHYSICAL EXAM:   Nursing note and vitals reviewed. LMP 01/21/1966  as per patient  Constitutional: She appears well-developed and well-nourished. No acute distress. Skin: Skin appears to be warm and dry. No rashes or any other marks noted. She is not diaphoretic. Neurological/Psychiatric:She is alert and oriented to person, place, and time. Coordination is  normal.  Her mood isAppropriate and affect is Neutral/Euthymic(normal). Her behavior is normal.   thought content normal.   Musculoskeletal / Extremities: not assessed    IMPRESSION:   1. Chronic pain syndrome    2. Bilateral shoulder region arthritis    3. Localized osteoarthritis of knees, bilateral    4. DDD (degenerative disc disease), lumbosacral    5. Facet arthropathy, lumbosacral    6. Spondylolisthesis at L4-L5 level    7. Spinal stenosis of lumbar region, unspecified whether neurogenic claudication present    8. Arthritis of both feet    9. Morbid obesity ([address]    10. Primary insomnia    11.  Osteoarthritis of right knee, unspecified osteoarthritis type        PLAN:  Informed verbal consent regarding treatment was obtained  -Continue with Norco, Cymbalta, Biomed cream  -Home PT  -CBT techniques- relaxation therapies such as biofeedback, mindfulness based stress reduction, imagery, cognitive restructuring, problem solving discussed with patient  -She was advised weight reduction, diet changes- 800-1200 sierra diet, diet diary, exercising, nutritional  consult also explained to the patient. Informed verbal consent was obtained. Goals of current treatment regimen include improvement in pain, restoration of functioning- with focus on improvement in physical performance, general activity, work or disability,emotional distress, health care utilization and  decreased medication consumption. Will continue to monitor progress towards achieving/maintaining therapeutic goals with special emphasis on  1. Improvement in perceived interfernce  of pain with ADL's. Ability to do home exercises independently. Ability to do household chores indoor and/or outdoor work and social and leisure activities. Improve psychosocial and physical functioning. - she is showing progression towards this treatment goal with the current regimen. She was advised against drinking alcohol with the narcotic pain medicines, advised against driving or handling machinery while adjusting the dose of medicines or if having cognitive  issues related to the current medications. Risk of overdose and death, if medicines not taken as prescribed, were also discussed. If the patient develops new symptoms or if the symptoms worsen, the patient should call the office. While transcribing every attempt was made to maintain the accuracy of the note in terms of it's contents,there may have been some errors made inadvertently. Thank you for allowing me to participate in the care of this patient. Elli Silva.  Maureen Urrutia APRN-CNP     Cc: Kalin Hawkins MD

## 2020-09-23 NOTE — TELEPHONE ENCOUNTER
----- Message from Maninder Rincon sent at 9/23/2020 10:19 AM EDT -----  Subject: Message to Provider    QUESTIONS  Information for Provider? pt cant cant walk or stand needs a ref to phy   therapy. went to the er initially for bp concerns. ---------------------------------------------------------------------------  --------------  Emanuel Spearing INFO  What is the best way for the office to contact you? OK to leave message on   voicemail  Preferred Call Back Phone Number? [phone]  ---------------------------------------------------------------------------  --------------  SCRIPT ANSWERS  Relationship to Patient?  Self

## 2020-09-25 NOTE — TELEPHONE ENCOUNTER
I called pt and she stated that this would be a new referral due to when she went in the hospital for low bp spirit stopped her pt pt stated they ill be in her home on Tuesday.  Pt home care states pt will not be able to do pt due to a fall she had on 9-24-20 pt has a sprang ankle

## 2020-09-29 NOTE — TELEPHONE ENCOUNTER
CHADWICK CALLED ASKING FOR  PT TO GET [facility] FOR [facility] NURSE PT HAS OT AND PT SET UP WITH [facility] PT IS IN CARE CORDINATION

## 2020-09-29 NOTE — CARE COORDINATION
Ambulatory Care Coordination Note  9/29/2020  CM Risk Score: 5  Charlson 10 Year Mortality Risk Score: 100%     ACC: Rosaura Castelan RN    Summary Note: Establish Care  Plan:  Follow up one week    Ambulatory Care Coordination Assessment    Care Coordination Protocol  Program Enrollment:  Complex Care  Referral from Primary Care Provider:  No  Week 1 - Initial Assessment     Do you have all of your prescriptions and are they filled?:  Yes  Are you able to afford your medications?:  No  How often do you have trouble taking your medications the way you have been told to take them?:  I always take them as prescribed. Do you have Home O2 Therapy?:  No      Ability to seek help/take action for Emergent Urgent situations i.e. fire, crime, inclement weather or health crisis. :  Dependent  Ability to ambulate to restroom:  Dependent  Ability handle personal hygeine needs (bathing/dressing/grooming):  Dependent  Ability to manage Medications:  Needs Assistance  Ability to prepare Food Preparation:  Dependent  Ability to maintain home (clean home, laundry):  Dependent  Ability to drive and/or has transportation:  Dependent  Ability to do shopping:  Dependent  Ability to manage finances:  Dependent  Is patient able to live independently?:  Yes     Current Housing:  Apartment        Per the Fall Risk Screening, did the patient have 2 or more falls or 1 fall with injury in the past year?:  Yes  How often do you think you are about to fall and you do NOT fall?  For example, you grab something to stabilize yourself or hold onto a wall/furniture?:  Occasionally  Use of a Mobility Aid:  Yes  Difficulty walking/impaired gait:  Yes  Issues with feet or shoes like numbness, edema, shoes not fitting:  No           Thinking about your patient's physical health needs, are there any symptoms or problems (risk indicators) you are unsure about that require further investigation?:  Mild vague physical symptoms or problems; but do not impact

## 2020-10-01 NOTE — PATIENT INSTRUCTIONS
Patient Education        Preventing Falls: Care Instructions  Your Care Instructions     Getting around your home safely can be a challenge if you have injuries or health problems that make it easy for you to fall. Loose rugs and furniture in walkways are among the dangers for many older people who have problems walking or who have poor eyesight. People who have conditions such as arthritis, osteoporosis, or dementia also have to be careful not to fall. You can make your home safer with a few simple measures. Follow-up care is a key part of your treatment and safety. Be sure to make and go to all appointments, and call your doctor if you are having problems. It's also a good idea to know your test results and keep a list of the medicines you take. How can you care for yourself at home? Taking care of yourself  · You may get dizzy if you do not drink enough water. To prevent dehydration, drink plenty of fluids, enough so that your urine is light yellow or clear like water. Choose water and other caffeine-free clear liquids. If you have kidney, heart, or liver disease and have to limit fluids, talk with your doctor before you increase the amount of fluids you drink. · Exercise regularly to improve your strength, muscle tone, and balance. Walk if you can. Swimming may be a good choice if you cannot walk easily. · Have your vision and hearing checked each year or any time you notice a change. If you have trouble seeing and hearing, you might not be able to avoid objects and could lose your balance. · Know the side effects of the medicines you take. Ask your doctor or pharmacist whether the medicines you take can affect your balance. Sleeping pills or sedatives can affect your balance. · Limit the amount of alcohol you drink. Alcohol can impair your balance and other senses. · Ask your doctor whether calluses or corns on your feet need to be removed.  If you wear loose-fitting shoes because of calluses or corns, you can lose your balance and fall. · Talk to your doctor if you have numbness in your feet. Preventing falls at home  · Remove raised doorway thresholds, throw rugs, and clutter. Repair loose carpet or raised areas in the floor. · Move furniture and electrical cords to keep them out of walking paths. · Use nonskid floor wax, and wipe up spills right away, especially on ceramic tile floors. · If you use a walker or cane, put rubber tips on it. If you use crutches, clean the bottoms of them regularly with an abrasive pad, such as steel wool. · Keep your house well lit, especially Jaleesa Mendoza, and outside walkways. Use night-lights in areas such as hallways and bathrooms. Add extra light switches or use remote switches (such as switches that go on or off when you clap your hands) to make it easier to turn lights on if you have to get up during the night. · Install sturdy handrails on stairways. · Move items in your cabinets so that the things you use a lot are on the lower shelves (about waist level). · Keep a cordless phone and a flashlight with new batteries by your bed. If possible, put a phone in each of the main rooms of your house, or carry a cell phone in case you fall and cannot reach a phone. Or, you can wear a device around your neck or wrist. You push a button that sends a signal for help. · Wear low-heeled shoes that fit well and give your feet good support. Use footwear with nonskid soles. Check the heels and soles of your shoes for wear. Repair or replace worn heels or soles. · Do not wear socks without shoes on wood floors. · Walk on the grass when the sidewalks are slippery. If you live in an area that gets snow and ice in the winter, sprinkle salt on slippery steps and sidewalks. Preventing falls in the bath  · Install grab bars and nonskid mats inside and outside your shower or tub and near the toilet and sinks. · Use shower chairs and bath benches.   · Use a hand-held shower head that will allow you to sit while showering. · Get into a tub or shower by putting the weaker leg in first. Get out of a tub or shower with your strong side first.  · Repair loose toilet seats and consider installing a raised toilet seat to make getting on and off the toilet easier. · Keep your bathroom door unlocked while you are in the shower. Where can you learn more? Go to https://EpiBonepepicewVirtual Solutions.[employer] Vision. org and sign in to your UiTV account. Enter [phone] [id number] [phone] in the [employer] box to learn more about \"Preventing Falls: Care Instructions. \"     If you do not have an account, please click on the \"Sign Up Now\" link. Current as of: August 7, 2019               Content Version: 12.5  © 0262-7726 Healthwise, Incorporated. Care instructions adapted under license by [facility] ([facility]). If you have questions about a medical condition or this instruction, always ask your healthcare professional. [place] [address] any warranty or liability for your use of this information. Patient Education        Strain or Sprain: Care Instructions  Your Care Instructions     A strain happens when you overstretch, or pull, a muscle. A sprain occurs when you stretch or tear a ligament, the tough tissue that connects one bone to another. These problems can happen when you exercise or lift something or when you are in an accident. Rest and other home care can help strains and sprains heal.  The doctor has checked you carefully, but problems can develop later. If you notice any problems or new symptoms,  get medical treatment right away. Follow-up care is a key part of your treatment and safety. Be sure to make and go to all appointments, and call your doctor if you are having problems. It's also a good idea to know your test results and keep a list of the medicines you take. How can you care for yourself at home?   · If your doctor gave you a sling, splint, brace, or immobilizer, use it exactly as directed. · Rest the strained or sprained area, and follow your doctor's advice about when you can be active again. · Put ice or a cold pack on the sore area for 10 to 20 minutes at a time to stop swelling. Try this every 1 to 2 hours for 3 days (when you are awake) or until the swelling goes down. Put a thin cloth between the ice pack and your skin. Keep your splint or brace dry. · Prop up a sore arm or leg on a pillow when you ice it or anytime you sit or lie down. Try to keep it higher than the level of your heart. This will help reduce swelling. · Take pain medicines exactly as directed. ? If the doctor gave you a prescription medicine for pain, take it as prescribed. ? If you are not taking a prescription pain medicine, ask your doctor if you can take an over-the-counter medicine. · Do exercises as directed by your doctor or physical therapist.  · Return to your usual level of activity slowly. · Do not do anything that makes the pain worse. When should you call for help? Call your doctor now or seek immediate medical care if:  · You have severe or increasing pain. · You have tingling, weakness, or numbness in the area. · The area turns cold or changes color. · Your cast or splint feels too tight. · You have symptoms of a blood clot, such as:  ? Pain in your calf, back of the knee, thigh, or groin. ? Redness and swelling in your leg or groin. · You cannot move the strained part of your body. Watch closely for changes in your health, and be sure to contact your doctor if:  · You do not get better as expected. Where can you learn more? Go to https://American Life MediayuniorVelteo.Accord. org and sign in to your iDoneThis account. Enter Q294 in the trueAnthem box to learn more about \"Strain or Sprain: Care Instructions. \"     If you do not have an account, please click on the \"Sign Up Now\" link.   Current as of: March 2, 2020               Content Version: 12.5  © 4848-0454 Healthwise, Incorporated. Care instructions adapted under license by [facility] ([facility]). If you have questions about a medical condition or this instruction, always ask your healthcare professional. [address] any warranty or liability for your use of this information.

## 2020-10-01 NOTE — PROGRESS NOTES
TELE HEALTH VISIT (AUDIO-VISUAL)    Pursuant to the emergency declaration under the [facility], [facility] waiver authority and the [employer] Act, this Virtual  Visit was conducted, with patient's consent, to reduce the patient's risk of exposure to COVID-19 and provide continuity of care for an established patient. Service is  provided through a video synchronous discussion virtually to substitute for in-person clinic visit. Due to this being a TeleHealth encounter ([facility]), evaluation of the following organ systems was limited: Vitals/Constitutional/EENT/Resp/CV/GI//MS/Neuro/Skin/Qafm-Anap-Uoz. [facility]  1945  [id number]    Ms. Norma Ybarra is being seen virtually for a follow up visit using Doxy. me/Biletu Video visit/Cloud4Wio or face time  Informed verbal consent to the virtual visit was obtained from Ms. Norma Ybarra. Risks associated with HIPPA compliance with the virtual visit was explained to the patient. Ms. Norma Ybarra is at her home and Stefany RAMOS is in her office. HISTORY OF PRESENT ILLNESS:  Ms. Norma Ybarra is a 66 y.o. female  being assessed for a follow up visit for pain management for evaluation of ongoing care regarding her symptoms and monitoring of compliance with long term use high risk medications. She has a diagnosis of   1. Chronic pain syndrome    2. Fall, initial encounter    3. Sprain of left ankle, unspecified ligament, initial encounter    4. Bilateral shoulder region arthritis    5. Localized osteoarthritis of knees, bilateral    6. DDD (degenerative disc disease), lumbosacral    7. Facet arthropathy, lumbosacral    8. Spondylolisthesis at L4-L5 level    9. Spinal stenosis of lumbar region, unspecified whether neurogenic claudication present    10. Arthritis of both feet    11. Morbid obesity ([address]    12. Primary insomnia    13. Osteoarthritis of right knee, unspecified osteoarthritis type      On the Patients Pain Assessment form reviewed with the Medical Assistant:  She complains of pain in the left ankle(s): entire area and right shoulder(s): entire area  with radiation to the NA . She rates the pain 10/10 and describes it as sharp, aching, burning and tingling. Current treatment regimen has helped relieve about 60% of the pain. She denies any side effects from the current pain regimen. Patient reports that since the last follow up visit the physical functioning is worse, family/social relationships are unchanged, mood is worse sleep patterns are worse, and that the overall functioning is worse. Patient denies misusing/abusing her narcotic pain medications or using any illegal drugs. Upon obtaining medical history from Ms. Salinas Gutiérrez states that pain is manageable on current pain therapy. Takes pain medications as prescribed. Reports having fell last Thursday while attempting to get out of her wheel chair, she was treated in the ER for left ankle sprain. Symptoms have since improved. Pain medications provide adequate relief of pain, currently has a brace to the left ankle. Still getting home health assistance. Mood is stable without anxiety. Sleep is fair with an average of 5-6 hours. Denies to having issues of constipation. Tolerating activities/house chores with moderate tenderness to the lower back/shoulders. ALLERGIES: Patients list of allergies were reviewed     MEDICATIONS: Ms. Salinas Gutiérrez list of medications were reviewed. Her current medications are   Outpatient Medications Prior to Visit   Medication Sig Dispense Refill    benzonatate (TESSALON) 200 MG capsule TAKE 1 CAPSULE BY MOUTH TWICE DAILY AS NEEDED FOR COUGH 30 capsule 0    vitamin D (ERGOCALCIFEROL) 1.25 MG (54195 UT) CAPS capsule Take 50,000 Units by mouth once a week      gabapentin (NEURONTIN) 300 MG capsule Take 300 mg by mouth daily.       lidocaine (NORCO) 7.5-325 MG per tablet Take 1 tablet by mouth every 8 hours as needed for Pain (Take no more than 2-3 tablets a day prn) for up to 30 days. 90 tablet 0    DULoxetine (CYMBALTA) 30 MG extended release capsule Take 1 capsule by mouth daily 30 capsule 1    Diclofenac Sodium POWD #5D Formula Diclo 3%, Lido 2%, Prilo 2% Pharm to comp: Apply to the shoulder twice a day 1 Bottle 1     No facility-administered medications prior to visit. SOCIAL/FAMILY/PAST MEDICAL HISTORY: Ms. Rivera Rodirgez, family and past medical history was reviewed. REVIEW OF SYSTEMS:    Respiratory: Negative for apnea, chest tightness and shortness of breath or change in baseline breathing. Gastrointestinal: Negative for nausea, vomiting, abdominal pain, diarrhea, constipation, blood in stool and abdominal distention. PHYSICAL EXAM:   Nursing note and vitals reviewed. LMP 01/21/1964  as per patient  Constitutional: She appears well-developed and well-nourished. No acute distress. Skin: Skin appears to be warm and dry. No rashes or any other marks noted. She is not diaphoretic. Neurological/Psychiatric:She is alert and oriented to person, place, and time. Coordination is  normal.  Her mood isAppropriate and affect is Neutral/Euthymic(normal). Her behavior is normal.   thought content normal.   Musculoskeletal / Extremities: Gait is normal, assistive devices use: none. IMPRESSION:   1. Chronic pain syndrome    2. Fall, initial encounter    3. Sprain of left ankle, unspecified ligament, initial encounter    4. Bilateral shoulder region arthritis    5. Localized osteoarthritis of knees, bilateral    6. DDD (degenerative disc disease), lumbosacral    7. Facet arthropathy, lumbosacral    8. Spondylolisthesis at L4-L5 level    9. Spinal stenosis of lumbar region, unspecified whether neurogenic claudication present    10. Arthritis of both feet    11. Morbid obesity ([address]    12. Primary insomnia    13.  Osteoarthritis of right knee, unspecified osteoarthritis type        PLAN:  Informed verbal consent regarding treatment was obtained  -Continue with Norco, Cymbalta, Biomed cream  -Home stretches/exercises  -Advised to call the office as needed for questions or concerns,   -Education provided on safety in the home, to reduce risk of falling. Advised patient to get up slowly from the chair or bed after sleeping at night. Maintain clutter free environment, well lit rooms, non-skid shoes, use walker/w/c at all times as advised. -CBT techniques- relaxation therapies such as biofeedback, mindfulness based stress reduction, imagery, cognitive restructuring, problem solving discussed with patient  -She was advised weight reduction, diet changes- 800-1200 sierra diet, diet diary, exercising, nutritional  consult increased physical activity as tolerated  -Last UDS 8/11/20 Consistent  -Return in about 4 weeks (around 10/29/2020). Current Outpatient Medications   Medication Sig Dispense Refill    HYDROcodone-acetaminophen (NORCO) 7.5-325 MG per tablet Take 1 tablet by mouth every 8 hours as needed for Pain (Take no more than 2-3 tablets a day prn) for up to 30 days. 90 tablet 0    DULoxetine (CYMBALTA) 30 MG extended release capsule Take 1 capsule by mouth daily 30 capsule 1    Diclofenac Sodium POWD #5D Formula Diclo 3%, Lido 2%, Prilo 2% Pharm to comp: Apply to the shoulder twice a day 1 Bottle 1    benzonatate (TESSALON) 200 MG capsule TAKE 1 CAPSULE BY MOUTH TWICE DAILY AS NEEDED FOR COUGH 30 capsule 0    vitamin D (ERGOCALCIFEROL) 1.25 MG (97275 UT) CAPS capsule Take 50,000 Units by mouth once a week      gabapentin (NEURONTIN) 300 MG capsule Take 300 mg by mouth daily.  lidocaine (LIDODERM) 5 % Place 1 patch onto the skin daily 12 hours on, 12 hours off.       midodrine (PROAMATINE) 5 MG tablet Take 1 tablet by mouth 3 times daily 90 tablet 2    fluticasone (FLONASE) 50 MCG/ACT nasal spray 2 sprays by Nasal route daily as needed for Rhinitis 1 Bottle 1    pantoprazole (PROTONIX) 40 MG tablet Take 1 tablet by mouth every morning (before breakfast) 30 tablet 3    Incontinence Supply Disposable (DEPEND UNDERWEAR LARGE/XL) MISC 1 Device by Does not apply route 4 times daily as needed (PRN) 120 each 2    silver sulfADIAZINE (SILVADENE) 1 % cream Apply topically daily 400 g 1    pravastatin (PRAVACHOL) 40 MG tablet Take 1 tablet by mouth daily 30 tablet 3    Misc. Devices (GEL-FOAM CUSHION) MISC 1 Device by Does not apply route daily 1 each 0    Blood Pressure Monitor RACHELL 1 capsule by Does not apply route daily 1 Device 0    amiodarone (CORDARONE) 200 MG tablet Take 200 mg by mouth 2 times daily     [address] by Does not apply route 1 each 0    dermacerin (EUCERIN) CREA cream SMALL AMOUNT BID / PRN 1 Tube 0    Blood Pressure Monitor RACHELL 1 Device by Does not apply route daily 1 Device 0    traZODone (DESYREL) 100 MG tablet Take 1 tablet by mouth nightly as needed for Sleep 30 tablet 3    aspirin (ASPIRIN LOW DOSE) 81 MG EC tablet Take 1 tablet by mouth daily 30 tablet 5    albuterol sulfate HFA (PROAIR HFA) 108 (90 Base) MCG/ACT inhaler Inhale 2 puffs into the lungs every 6 hours as needed for Shortness of Breath 1 Inhaler 0    Scooter MISC by Does not apply route 1 each 0    clopidogrel (PLAVIX) 75 MG tablet Take 1 tablet by mouth daily 30 tablet 3    Blood Pressure Monitor MISC 1 Device by Does not apply route as needed (prn) 1 each 0    Blood Pressure Monitoring (5 SERIES BP MONITOR) RACHELL 1 Device by Does not apply route as needed (PRN) 1 Device 0    calcium acetate (PHOSLO) 667 MG capsule Take by mouth 3 times daily (with meals)       No current facility-administered medications for this visit. I will continue her current medication regimen  which is part of the above treatment schedule. It has been helping with Ms. Monreal's chronic  medical problems which for this visit include:   Diagnoses of Chronic pain syndrome, Fall, initial encounter, Sprain of left ankle, unspecified ligament, initial encounter, Bilateral shoulder region arthritis, Localized osteoarthritis of knees, bilateral, DDD (degenerative disc disease), lumbosacral, Facet arthropathy, lumbosacral, Spondylolisthesis at L4-L5 level, Spinal stenosis of lumbar region, unspecified whether neurogenic claudication present, Arthritis of both feet, Morbid obesity ([address] Primary insomnia, and Osteoarthritis of right knee, unspecified osteoarthritis type were pertinent to this visit. Risks and benefits of the medications and other alternative treatments  including no treatment were discussed with the patient. The common side effects of these medications were also explained to the patient. Informed verbal consent was obtained. Goals of current treatment regimen include improvement in pain, restoration of functioning- with focus on improvement in physical performance, general activity, work or disability,emotional distress, health care utilization and  decreased medication consumption. Will continue to monitor progress towards achieving/maintaining therapeutic goals with special emphasis on  1. Improvement in perceived interfernce  of pain with ADL's. Ability to do home exercises independently. Ability to do household chores indoor and/or outdoor work and social and leisure activities. Improve psychosocial and physical functioning. - she is showing progression towards this treatment goal with the current regimen. She was advised against drinking alcohol with the narcotic pain medicines, advised against driving or handling machinery while adjusting the dose of medicines or if having cognitive  issues related to the current medications. Risk of overdose and death, if medicines not taken as prescribed, were also discussed. If the patient develops new symptoms or if the symptoms worsen, the patient should call the office.     While transcribing every attempt was made to maintain the accuracy of the note in terms of it's contents,there may have been some errors made inadvertently. Thank you for allowing me to participate in the care of this patient. Manuelito Garcia.  Savage SARMIENTO-DARION     Cc: Adan Ramos MD

## 2020-10-13 NOTE — TELEPHONE ENCOUNTER
Patient is requesting refill on tesson pearls for a continuing cough she says she keeps getting please advise. Patient is at [facility] on Mon wed and thursday.

## 2020-10-15 NOTE — TELEPHONE ENCOUNTER
Patient is experiencing pain in her lower extremities was given before medication gabapentin and patient is requesting gabapentin please advise.

## 2020-10-21 NOTE — TELEPHONE ENCOUNTER
Patient called stating she doesn't want pain medication, but she does want Gabapentin for her nerve pain.     hAmet Stewart / Marybel Sheehan

## 2020-10-22 NOTE — TELEPHONE ENCOUNTER
She came into the [facility] office today with her pain medications.    Rx: Norco 7.5/325  [id number]  Filled on: 20  Pills do match the bottle's description  Pills presented: 33  Should have 0 pills remaining   Pill count passed

## 2020-10-27 NOTE — PROGRESS NOTES
SUBJECTIVE:  Cecilia Ta is a 66 y.o. female [address] Complaint   Patient presents with    Hyperlipidemia    Other     pt has yest infecton      PT HERE FOR EVAL . PT HERE WITH AIDE    HYPOTENSION - PT ON MIDODRINE. DENIES DIZZINESS  NEUROPATHY - PT NEURONTIN 300 MG DAILY. ? NUMBNESS / TINGLING  A. FIB - PT OFF ELIQUIS DUE TO H/O GIB. S/P CARDIOLODY EVAL  [facility] -  TAKING PRAVACHOL. EXERCISE / DIET COMPLIANCE . NO MUSCLE ACHES. LABS D/W PT  [facility]D-  ? MED COMPLIANCE. NO HEARTBURN.  NO BELCHING  VIT D DEF -  VIT D 56639 WEEKLY - ? MED COMPLIANCE. PREVIOUS  LABS D/W PT. [facility]qu[facility] [place] ESRD - FOLLOWING WITH NEPHROLOGY. ON DIALYSIS - TOLERATING  ALLERGIC RHINITIS - +  RHINORRHEA , NO NASAL CONGESTION, NO POSTNASAL DRAINAGE , NO SINUS PRESSURE, NO HA, NO SNEEZING, + OCC WATERY ITCHY EYES. C/O VAGINAL DISCHARGE - + THICK. ? NO ODOR. ? ITCHING  CHRONIC PAIN - FOLLOWING WITH PAIN MGT       DENIES CP, No SOB, No PALPITATIONS, COUGH - IMPROVED. NO F/C  No ABD PAIN, No N/V, No DIARRHEA, No CONSTIPATION, No MELENA, No HEMATOCHEZIA.  NOT MAKING URINE AT THIS TIME         PMH: REVIEWED AND UPDATED TODAY    PSH: REVIEWED AND UPDATED TODAY    SOCIAL HX: REVIEWED AND UPDATED TODAY    FAMILY HX: REVIEWED AND UPDATED TODAY    ALLERGY:  Iv dye [iodides]; Lisinopril; Peanut oil; Hydralazine; and Lipitor [atorvastatin]    MEDS: REVIEWED  Prior to Visit Medications    Medication Sig Taking? Authorizing Provider   gabapentin (NEURONTIN) 300 MG capsule Take 1 capsule by mouth daily for 30 days. Yes Cornelius Spies, APRN - CNP   benzonatate (TESSALON) 200 MG capsule TAKE 1 CAPSULE BY MOUTH TWICE DAILY AS NEEDED FOR COUGH Yes Urmila Krishna MD   HYDROcodone-acetaminophen ([address]) 7.5-325 MG per tablet Take 1 tablet by mouth every 8 hours as needed for Pain (Take no more than 2-3 tablets a day prn) for up to 30 days.  Yes Cornelius Spies, APRN - CNP   DULoxetine (CYMBALTA) 30 MG extended release capsule Take 1 capsule by mouth daily Yes TORSTEN Short CNP   Diclofenac Sodium POWD #5D Formula Diclo 3%, Lido 2%, Prilo 2% Pharm to comp: Apply to the shoulder twice a day Yes TORSTEN Short CNP   vitamin D (ERGOCALCIFEROL) 1.25 MG (42815 UT) CAPS capsule Take 50,000 Units by mouth once a week Yes Historical Provider, MD   lidocaine (LIDODERM) 5 % Place 1 patch onto the skin daily 12 hours on, 12 hours off. Yes Historical Provider, MD   midodrine (PROAMATINE) 5 MG tablet Take 1 tablet by mouth 3 times daily Yes Hazel Carlson MD   fluticasone (FLONASE) 50 MCG/ACT nasal spray 2 sprays by Nasal route daily as needed for Rhinitis Yes Hazel Carlson MD   pantoprazole (PROTONIX) 40 MG tablet Take 1 tablet by mouth every morning (before breakfast) Yes Hazel Carlson MD   Incontinence Supply Disposable (DEPEND UNDERWEAR LARGE/XL) MISC 1 Device by Does not apply route 4 times daily as needed (PRN) Yes Hazel Carlson MD   silver sulfADIAZINE (SILVADENE) 1 % cream Apply topically daily Yes Hazel Carlson MD   pravastatin (PRAVACHOL) 40 MG tablet Take 1 tablet by mouth daily Yes Hazel Carlson MD   Misc.  Devices (GEL-FOAM CUSHION) MISC 1 Device by Does not apply route daily Yes Hazel Carlson MD   Blood Pressure Monitor RACHELL 1 capsule by Does not apply route daily Yes Hazel Carlson MD   amiodarone (CORDARONE) 200 MG tablet Take 200 mg by mouth 2 times daily Yes Historical Provider, [address] [place] [address] by Does not apply route Yes Hazel Carlson MD   dermacerin (EUCERIN) CREA cream SMALL AMOUNT BID / PRN Yes Hazel Carlson MD   Blood Pressure Monitor RACHELL 1 Device by Does not apply route daily Yes Hazel Carlson MD   traZODone (DESYREL) 100 MG tablet Take 1 tablet by mouth nightly as needed for Sleep Yes Hazel Carlson MD   aspirin (ASPIRIN LOW DOSE) 81 MG EC tablet Take 1 tablet by mouth daily Yes Hazel Carlson MD   albuterol sulfate HFA (PROAIR HFA) 108 (90 Base) MCG/ACT inhaler Inhale 2 puffs into the lungs every 6 hours as needed for Shortness of Breath Yes Claudette Grief, MD   Scooter MISC by Does not apply route Yes Claudette Grief, MD   clopidogrel (PLAVIX) 75 MG tablet Take 1 tablet by mouth daily Yes TORSTEN Dooley - CNP   Blood Pressure Monitor MISC 1 Device by Does not apply route as needed (prn) Yes Claudette Grief, MD   Blood Pressure Monitoring (5 SERIES BP MONITOR) RACHELL 1 Device by Does not apply route as needed (PRN) Yes Claudette Grief, MD   calcium acetate (PHOSLO) 667 MG capsule Take by mouth 3 times daily (with meals) Yes Historical Provider, MD       ROS: COMPREHENSIVE ROS AS IN HX, REST -VE  History obtained from chart review and the patient    OBJECTIVE:   NURSING NOTE AND VITALS REVIEWED  BP 90/60 (Site: Left Upper Arm, Position: Sitting, Cuff Size: Large Adult)   Pulse 73   Temp 98.6 °F (37 °C)   LMP 01/21/1964   SpO2 97%   Breastfeeding No     NO ACUTE DISTRESS    REPEAT BP: 90/60 (RT)    There is no height or weight on file to calculate BMI. HEENT: NO PALLOR, ANICTERIC, PERRLA, EOMI, NO CONJUNCTIVAL ERYTHEMA,                 NO SINUS TENDERNESS  NECK:  SUPPLE, TRACHEA MIDLINE, NT, NO JVD, NO CB, NO LA, NO TM, NO STIFFNESS  CHEST: RESPY EFFORT NL, GOOD AE, NO W/R/C  HEART: S1S2+ REG, NO M/G/R  ABD: OBESE, SOFT, NT, NO HSM, BS+  EXT: NO EDEMA, NT, PULSES +. KEARA'S -VE  NEURO: ALERT AND ORIENTED X 3, NO MENINGEAL SIGNS, NO TREMORS, PT ON STRETCHER TODAY, NO NEW FOCAL DEFICITS  PSYCH: FAIRLY GOOD AFFECT  BACK: NT, NO ROM, NO CVA TENDERNESS    PREVIOUS LABS  REVIEWED AND D/W PT       ASSESSMENT / PLAN:     Diagnosis Orders   1. Other specified hypotension  COUNSELLED. CONTINUE midodrine (PROAMATINE) 5 MG - REFILLED  ADVISED FOLLOW UP ALSO WITH NEPHROLOGY  PT VERBALIZED UNDERSTANDING  MAKE CHANGES AS NEEDED. 2. Neuropathy  COUNSELLED. MONITOR ON MED  MAKE CHANGES AS NEEDED. 3. Atrial fibrillation, unspecified type ([address]  COUNSELLED. CONTINUE MGT.   HR REGULAR - RATE CONTROLLED. F/U CARDIO. MAKE CHANGES AS NEEDED. 4. Hyperlipidemia LDL goal <100  COUNSELLED. CONTINUE PRAVACHOL  ADVISED LOW FAT / CHOL DIET/ EXERCISE.  MONITOR. GOALS D/W PT.  MAKE CHANGES AS NEEDED. 5. Gastroesophageal reflux disease without esophagitis  COUNSELLED. MED D/CAMILO PER PT REQUEST  ANTIREFLUX PRECAUTIONS ADVISED. MONITOR. MAKE CHANGES AS NEEDED. 6. Vitamin D deficiency  COUNSELLED. MONITOR ON VIT D SUPPLEMENT. MAKE CHANGES AS NEEDED. 7. ESRD (end stage renal disease) ([facility] [address]  COUNSELLED. PT ON HD - ENCOURAGED. MONITOR  F/U RENAL  MAKE CHANGES AS NEEDED. 8. Other allergic rhinitis  COUNSELLED. MED PRN. MONITOR  MAKE CHANGES AS NEEDED. 9. Candidiasis of vagina  COUNSELLED. RX WITH  DIFLUCAN 150 MG ONCE. MAY REPEAT X1 AFTER 48 HRS  ADVISED F/U GYNE IF PERSISTENT SYMPTOMS  MAINTAIN HYGIENE  PT VERBALIZED UNDERSTANDING  MAKE CHANGES AS NEEDED. 10. Other chronic pain  COUNSELLED. F/U PAIN MGT  MAKE CHANGES AS NEEDED.               PT HAD FLU VACCINE AT HD            MEDICATION SIDE EFFECTS D/W PATIENT    PT STABLE AT TIME OF D/C.    RETURN TO CLINIC WITHIN 2-3 MONTHS / PRN    FOLLOW UP WITH NEPHROLOGY, GYNE

## 2020-10-29 NOTE — PROGRESS NOTES
TELE HEALTH VISIT (AUDIO-VISUAL)    Pursuant to the emergency declaration under the [facility], [facility] waiver authority and the [employer] and [employer] Act, this Virtual  Visit was conducted, with patient's consent, to reduce the patient's risk of exposure to COVID-19 and provide continuity of care for an established patient. Service is  provided through a video synchronous discussion virtually to substitute for in-person clinic visit. Due to this being a TeleHealth encounter (During EAKQA-80 public health emergency), evaluation of the following organ systems was limited: Vitals/Constitutional/EENT/Resp/CV/GI//MS/Neuro/Skin/Zork-Ykhd-Bdb. Nguyễn Rose  1945  [id number]    Ms. Lida Tubbs is being seen virtually for a follow up visit using Doxy. me/[facility] Video visit/PsyQico or face time  Informed verbal consent to the virtual visit was obtained from Ms. Lida Tubbs. Risks associated with HIPPA compliance with the virtual visit was explained to the patient. Ms. Lida Tubbs is at her home and Kory RAMOS is in her office. HISTORY OF PRESENT ILLNESS:  Ms. Lida Tubbs is a 66 y.o. female  being assessed for a follow up visit for pain management for evaluation of ongoing care regarding her symptoms and monitoring of compliance with long term use high risk medications. She has a diagnosis of   1. Chronic pain syndrome    2. Bilateral shoulder region arthritis    3. Localized osteoarthritis of knees, bilateral    4. DDD (degenerative disc disease), lumbosacral    5. Facet arthropathy, lumbosacral    6. Spondylolisthesis at L4-L5 level    7. Spinal stenosis of lumbar region, unspecified whether neurogenic claudication present    8. Arthritis of both feet    9. Sprain of left ankle, unspecified ligament, initial encounter    10. Morbid obesity ([address]    11. Primary insomnia    12.  Osteoarthritis of right knee, unspecified osteoarthritis type    13. At high risk for falls      On the Patients Pain Assessment form reviewed with the Medical Assistant:  She complains of pain in the both shoulder(s): entire area  with radiation to the NA . She rates the pain 8/10 and describes it as aching. Current treatment regimen has helped relieve about 0% of the pain. She denies any side effects from the current pain regimen. Patient reports that since the last follow up visit the physical functioning is unchanged, family/social relationships are unchanged, mood is unchanged sleep patterns are unchanged, and that the overall functioning is unchanged. Patient denies misusing/abusing her narcotic pain medications or using any illegal drugs. Upon obtaining medical history from Ms. Yuliet marie states that pain is manageable on current pain therapy. Reports Neurontin seems to work better that the opioids, her daughter dropped off the remainder of her pain pills. Currently has home PT, also has home health assistance. Mood is stable, sleep is fair with an average of 5-6 hours. Denies to having issues of constipation. Tolerating activities/house chores with moderate tenderness to the lower back. ALLERGIES: Patients list of allergies were reviewed     MEDICATIONS: Ms. [place] list of medications were reviewed. Her current medications are   Outpatient Medications Prior to Visit   Medication Sig Dispense Refill    midodrine (PROAMATINE) 5 MG tablet Take 1 tablet by mouth 3 times daily 90 tablet 2    pravastatin (PRAVACHOL) 40 MG tablet Take 1 tablet by mouth daily 30 tablet 3    dermacerin (EUCERIN) CREA cream SMALL AMOUNT BID / PRN 1 Tube 0    gabapentin (NEURONTIN) 300 MG capsule Take 1 capsule by mouth daily for 30 days.  30 capsule 0    benzonatate (TESSALON) 200 MG capsule TAKE 1 CAPSULE BY MOUTH TWICE DAILY AS NEEDED FOR COUGH 30 capsule 0    DULoxetine (CYMBALTA) 30 MG extended release capsule Take 1 capsule by mouth daily 30 capsule 1    Diclofenac Sodium POWD #5D Formula Diclo 3%, Lido 2%, Prilo 2% Pharm to comp: Apply to the shoulder twice a day 1 Bottle 1    vitamin D (ERGOCALCIFEROL) 1.25 MG (44272 UT) CAPS capsule Take 50,000 Units by mouth once a week      lidocaine (LIDODERM) 5 % Place 1 patch onto the skin daily 12 hours on, 12 hours off.  fluticasone (FLONASE) 50 MCG/ACT nasal spray 2 sprays by Nasal route daily as needed for Rhinitis 1 Bottle 1    Incontinence Supply Disposable (DEPEND UNDERWEAR LARGE/XL) MISC 1 Device by Does not apply route 4 times daily as needed (PRN) 120 each 2    silver sulfADIAZINE (SILVADENE) 1 % cream Apply topically daily 400 g 1    Misc.  Devices (GEL-FOAM CUSHION) MISC 1 Device by Does not apply route daily 1 each 0    Blood Pressure Monitor RACHELL 1 capsule by Does not apply route daily 1 Device 0    amiodarone (CORDARONE) 200 MG tablet Take 200 mg by mouth 2 times daily     [address] by Does not apply route 1 each 0    Blood Pressure Monitor RACHELL 1 Device by Does not apply route daily 1 Device 0    traZODone (DESYREL) 100 MG tablet Take 1 tablet by mouth nightly as needed for Sleep 30 tablet 3    aspirin (ASPIRIN LOW DOSE) 81 MG EC tablet Take 1 tablet by mouth daily 30 tablet 5    albuterol sulfate HFA (PROAIR HFA) 108 (90 Base) MCG/ACT inhaler Inhale 2 puffs into the lungs every 6 hours as needed for Shortness of Breath 1 Inhaler 0    Scooter MISC by Does not apply route 1 each 0    clopidogrel (PLAVIX) 75 MG tablet Take 1 tablet by mouth daily 30 tablet 3    Blood Pressure Monitor MISC 1 Device by Does not apply route as needed (prn) 1 each 0    Blood Pressure Monitoring (5 SERIES BP MONITOR) RACHELL 1 Device by Does not apply route as needed (PRN) 1 Device 0    calcium acetate (PHOSLO) 667 MG capsule Take by mouth 3 times daily (with meals)      HYDROcodone-acetaminophen (NORCO) 7.5-325 MG per tablet Take 1 tablet by mouth every 8 hours as needed for Pain (Take no more than 2-3 tablets a day prn) for up to 30 days. 90 tablet 0     No facility-administered medications prior to visit. SOCIAL/FAMILY/PAST MEDICAL HISTORY: Ms. Saul Fernandez, family and past medical history was reviewed. REVIEW OF SYSTEMS:    Respiratory: Negative for apnea, chest tightness and shortness of breath or change in baseline breathing. Gastrointestinal: Negative for nausea, vomiting, abdominal pain, diarrhea, constipation, blood in stool and abdominal distention. PHYSICAL EXAM:   Nursing note and vitals reviewed. LMP 01/21/1964  as per patient  Constitutional: She appears well-developed and well-nourished. No acute distress. Skin: Skin appears to be warm and dry. No rashes or any other marks noted. She is not diaphoretic. Neurological/Psychiatric:She is alert and oriented to person, place, and time. Coordination is  normal.  Her mood isAppropriate and affect is Neutral/Euthymic(normal). Her behavior is normal.   thought content normal.   Musculoskeletal / Extremities: Not assessed    IMPRESSION:   1. Chronic pain syndrome    2. Bilateral shoulder region arthritis    3. Localized osteoarthritis of knees, bilateral    4. DDD (degenerative disc disease), lumbosacral    5. Facet arthropathy, lumbosacral    6. Spondylolisthesis at L4-L5 level    7. Spinal stenosis of lumbar region, unspecified whether neurogenic claudication present    8. Arthritis of both feet    9. Sprain of left ankle, unspecified ligament, initial encounter    10. Morbid obesity ([address]    11. Primary insomnia    12. Osteoarthritis of right knee, unspecified osteoarthritis type    13.  At high risk for falls        PLAN:  Informed verbal consent regarding treatment was obtained  -Continue with Baclofen, Biomed cream, Cymbalta, Diclofenac powder  -D/c Norco, Prescribed Neurontin 300 mg tid  -Currently in Home PT  -CBT techniques- relaxation therapies such as biofeedback, mindfulness based stress reduction, imagery, cognitive restructuring, problem solving discussed with patient  -She was advised weight reduction, diet changes- 800-1200 sierra diet, diet diary, exercising, nutritional  consult increased physical activity as tolerated  -Reviewed Covid-19 safety regulations which were discussed including Flu vaccine, patient maintains compliance with the safety guidelines regarding Covid-19  -Last uds concistent  -Return in about 4 weeks (around 11/26/2020). -[place] record was obtained and reviewed  for the last one year and no indicators of drug misuse  were found. Any other controlled substance prescriptions  seen on the record have been accounted for, I am aware of the patient receiving these medications. Rena Armenta [place] record will be rechecked as part of office protocol. Current Outpatient Medications   Medication Sig Dispense Refill    midodrine (PROAMATINE) 5 MG tablet Take 1 tablet by mouth 3 times daily 90 tablet 2    pravastatin (PRAVACHOL) 40 MG tablet Take 1 tablet by mouth daily 30 tablet 3    dermacerin (EUCERIN) CREA cream SMALL AMOUNT BID / PRN 1 Tube 0    gabapentin (NEURONTIN) 300 MG capsule Take 1 capsule by mouth daily for 30 days. 30 capsule 0    benzonatate (TESSALON) 200 MG capsule TAKE 1 CAPSULE BY MOUTH TWICE DAILY AS NEEDED FOR COUGH 30 capsule 0    DULoxetine (CYMBALTA) 30 MG extended release capsule Take 1 capsule by mouth daily 30 capsule 1    Diclofenac Sodium POWD #5D Formula Diclo 3%, Lido 2%, Prilo 2% Pharm to comp: Apply to the shoulder twice a day 1 Bottle 1    vitamin D (ERGOCALCIFEROL) 1.25 MG (74588 UT) CAPS capsule Take 50,000 Units by mouth once a week      lidocaine (LIDODERM) 5 % Place 1 patch onto the skin daily 12 hours on, 12 hours off.       fluticasone (FLONASE) 50 MCG/ACT nasal spray 2 sprays by Nasal route daily as needed for Rhinitis 1 Bottle 1    Incontinence Supply Disposable (DEPEND UNDERWEAR LARGE/XL) MISC 1 Device by Does not apply route 4 times daily as needed (PRN) 120 each 2    silver sulfADIAZINE (SILVADENE) 1 % cream Apply topically daily 400 g 1    Misc. Devices (GEL-FOAM CUSHION) MISC 1 Device by Does not apply route daily 1 each 0    Blood Pressure Monitor RACHELL 1 capsule by Does not apply route daily 1 Device 0    amiodarone (CORDARONE) 200 MG tablet Take 200 mg by mouth 2 times daily     [address] by Does not apply route 1 each 0    Blood Pressure Monitor RACHELL 1 Device by Does not apply route daily 1 Device 0    traZODone (DESYREL) 100 MG tablet Take 1 tablet by mouth nightly as needed for Sleep 30 tablet 3    aspirin (ASPIRIN LOW DOSE) 81 MG EC tablet Take 1 tablet by mouth daily 30 tablet 5    albuterol sulfate HFA (PROAIR HFA) 108 (90 Base) MCG/ACT inhaler Inhale 2 puffs into the lungs every 6 hours as needed for Shortness of Breath 1 Inhaler 0    Scooter MISC by Does not apply route 1 each 0    clopidogrel (PLAVIX) 75 MG tablet Take 1 tablet by mouth daily 30 tablet 3    Blood Pressure Monitor MISC 1 Device by Does not apply route as needed (prn) 1 each 0    Blood Pressure Monitoring (5 SERIES BP MONITOR) RACHELL 1 Device by Does not apply route as needed (PRN) 1 Device 0    calcium acetate (PHOSLO) 667 MG capsule Take by mouth 3 times daily (with meals)       No current facility-administered medications for this visit. I will continue her current medication regimen  which is part of the above treatment schedule. It has been helping with Ms. Monreal's chronic  medical problems which for this visit include:   Diagnoses of Chronic pain syndrome, Bilateral shoulder region arthritis, Localized osteoarthritis of knees, bilateral, DDD (degenerative disc disease), lumbosacral, Facet arthropathy, lumbosacral, Spondylolisthesis at L4-L5 level, Spinal stenosis of lumbar region, unspecified whether neurogenic claudication present, Arthritis of both feet, Sprain of left ankle, unspecified ligament, initial encounter, Morbid obesity ([address] Primary insomnia, Osteoarthritis of right knee, unspecified osteoarthritis type, and At high risk for falls were pertinent to this visit. Risks and benefits of the medications and other alternative treatments  including no treatment were discussed with the patient. The common side effects of these medications were also explained to the patient. Informed verbal consent was obtained. Goals of current treatment regimen include improvement in pain, restoration of functioning- with focus on improvement in physical performance, general activity, work or disability,emotional distress, health care utilization and  decreased medication consumption. Will continue to monitor progress towards achieving/maintaining therapeutic goals with special emphasis on  1. Improvement in perceived interfernce  of pain with ADL's. Ability to do home exercises independently. Ability to do household chores indoor and/or outdoor work and social and leisure activities. Improve psychosocial and physical functioning. - she is showing progression towards this treatment goal with the current regimen. She was advised against drinking alcohol with the narcotic pain medicines, advised against driving or handling machinery while adjusting the dose of medicines or if having cognitive  issues related to the current medications. Risk of overdose and death, if medicines not taken as prescribed, were also discussed. If the patient develops new symptoms or if the symptoms worsen, the patient should call the office. While transcribing every attempt was made to maintain the accuracy of the note in terms of it's contents,there may have been some errors made inadvertently. Thank you for allowing me to participate in the care of this patient. Tammy Mitchell.  Gerda SARMIENTO-CNP     Cc: Lupe Gutierrez MD

## 2020-10-29 NOTE — PATIENT INSTRUCTIONS
and sign in to your [employer] account. Enter V197 in the [employer] box to learn more about \"Back Stretches: Exercises. \"     If you do not have an account, please click on the \"Sign Up Now\" link. Current as of: March 2, 2020               Content Version: 12.6  © 8654-9676 [employer]. Care instructions adapted under license by [facility] ([facility]). If you have questions about a medical condition or this instruction, always ask your healthcare professional. [address] any warranty or liability for your use of this information.

## 2020-11-10 NOTE — TELEPHONE ENCOUNTER
I called pt and her daughter to let them knoe med was refilled daughter did not know the name of the other med but will call if needed

## 2020-11-10 NOTE — TELEPHONE ENCOUNTER
Bro Hilton went to patient house and called patient. No one came to the door and no one picked up the phone.

## 2020-11-12 NOTE — TELEPHONE ENCOUNTER
Patient says that cough med is not working any longer she says she coughs all day and night and has coughed so much that her dialysis physician has order for her to have a xray done of her chest on the 17th patient wants to be advised if something else can be called in to pharmacy for her

## 2020-11-12 NOTE — TELEPHONE ENCOUNTER
I called pt to let her know that a new cough med was sent to her pharmacy and to make sure she gets the x-ray her nurse ordered got the v/m left message

## 2020-11-18 NOTE — TELEPHONE ENCOUNTER
----- Message from KARALIT sent at 11/18/2020  3:01 PM EST -----  Subject: Message to Provider    QUESTIONS  Information for Provider? Got Maegan on the line who gave daughter   Avril Ng the okay to help her with information. Want to know when they can   come in to update HIPAA and also need mom's chest x-ray results. Daughter's number is listed:  ---------------------------------------------------------------------------  --------------  CALL BACK INFO  What is the best way for the office to contact you? OK to leave message on   voicemail  Preferred Call Back Phone Number? [phone]  ---------------------------------------------------------------------------  --------------  SCRIPT ANSWERS  Relationship to Patient?  Self

## 2020-11-18 NOTE — TELEPHONE ENCOUNTER
IMPRESSION:    Mild left basilar parenchymal opacities.  Differential considerations include atelectasis and/or     infection     CALL PT WITH X RAY REPORT

## 2020-11-18 NOTE — TELEPHONE ENCOUNTER
I called pt daughter to let her know that her and her mom can come up and update pt mary     Pt would like to know results of x-ray

## 2020-11-25 NOTE — TELEPHONE ENCOUNTER
IMPRESSION:    Mild left basilar parenchymal opacities. Differential considerations include atelectasis and/or     infection.           CALLED PT TO DISCUSS X RAY REPORT  LEFT MSG ON VOICE MAIL

## 2020-12-01 NOTE — PROGRESS NOTES
TELE HEALTH VISIT (AUDIO-VISUAL)    Pursuant to the emergency declaration under the [facility], [facility] waiver authority and the [employer] and [employer] Act, this Virtual  Visit was conducted, with patient's consent, to reduce the patient's risk of exposure to COVID-19 and provide continuity of care for an established patient. Service is  provided through a video synchronous discussion virtually to substitute for in-person clinic visit. Due to this being a TeleHealth encounter (During KFTVF-36 public health emergency), evaluation of the following organ systems was limited: Vitals/Constitutional/EENT/Resp/CV/GI//MS/Neuro/Skin/Etnl-Aoue-Cbt. Jeny Pitch  1945  [id number]    Ms. Hai Landry is being seen virtually for a follow up visit using Doxy. me/[employer] Video visit/[employer] or face time  Informed verbal consent to the virtual visit was obtained from Ms. Hai Landry. Risks associated with HIPPA compliance with the virtual visit was explained to the patient. Ms. Hai Landry is at her home and [facility]. Brice RAMOS is in her office. HISTORY OF PRESENT ILLNESS:  Ms. Hai Landry is a 66 y.o. female  being assessed for a follow up visit for pain management for evaluation of ongoing care regarding her symptoms and monitoring of compliance with long term use high risk medications. She has a diagnosis of   1. Chronic pain syndrome    2. Bilateral shoulder region arthritis    3. Localized osteoarthritis of knees, bilateral    4. DDD (degenerative disc disease), lumbosacral    5. Facet arthropathy, lumbosacral    6. Spondylolisthesis at L4-L5 level    7. Spinal stenosis of lumbar region, unspecified whether neurogenic claudication present    8. Arthritis of both feet    9. Osteoarthritis of right knee, unspecified osteoarthritis type    10. Sprain of left ankle, unspecified ligament, initial encounter    11.  Morbid obesity ([address] 12. Primary insomnia      On the Patients Pain Assessment form reviewed with the Medical Assistant:  She complains of pain in both shoulders . She rates the pain 9/10 and describes it as aching. Current treatment regimen has helped relieve about 0% of the pain. She denies any side effects from the current pain regimen. Patient reports that since the last follow up visit the physical functioning is unchanged, family/social relationships are unchanged, mood is unchanged sleep patterns are unchanged, and that the overall functioning is unchanged. Patient denies misusing/abusing her narcotic pain medications or using any illegal drugs. Upon obtaining medical history from Ms. Kristen Taylor states that pain is manageable on current pain therapy. Reports having pain in the right shoulder secondary arthritis. Pain medications adequately manages her pain, takes them as prescribed. Mood is stable without anxiety. Sleep is fair with an average of 5-6 hours. Denies to having issues of constipation. Tolerating activities/house chores with moderate tenderness to the lower back/right shoulder. ALLERGIES: Patients list of allergies were reviewed     MEDICATIONS: Ms. Kristen Taylor list of medications were reviewed. Her current medications are   Outpatient Medications Prior to Visit   Medication Sig Dispense Refill    brompheniramine-pseudoephedrine-DM (BROMFED DM) 2-30-10 MG/5ML syrup Take 5 mLs by mouth 4 times daily as needed for Cough 240 mL 0    traZODone (DESYREL) 100 MG tablet Take 1 tablet by mouth nightly as needed for Sleep 30 tablet 1    benzonatate (TESSALON) 200 MG capsule TAKE 1 CAPSULE BY MOUTH TWICE DAILY AS NEEDED FOR COUGH 30 capsule 0    midodrine (PROAMATINE) 5 MG tablet Take 1 tablet by mouth 3 times daily 90 tablet 2    pravastatin (PRAVACHOL) 40 MG tablet Take 1 tablet by mouth daily 30 tablet 3    dermacerin (EUCERIN) CREA cream SMALL AMOUNT BID / PRN 1 Tube 0    vitamin D (ERGOCALCIFEROL) 1.25 MG SOCIAL/FAMILY/PAST MEDICAL HISTORY: Ms. Alfonzo Rincon, family and past medical history was reviewed. REVIEW OF SYSTEMS:    Respiratory: Negative for apnea, chest tightness and shortness of breath or change in baseline breathing. Gastrointestinal: Negative for nausea, vomiting, abdominal pain, diarrhea, constipation, blood in stool and abdominal distention. PHYSICAL EXAM:   Nursing note and vitals reviewed. LMP 01/21/1964  as per patient  Constitutional: She appears well-developed and well-nourished. No acute distress. Skin: Skin appears to be warm and dry. No rashes or any other marks noted. She is not diaphoretic. Neurological/Psychiatric:She is alert and oriented to person, place, and time. Coordination is  normal.  Her mood isAppropriate and affect is Neutral/Euthymic(normal). Her behavior is normal.   thought content normal.   Musculoskeletal / Extremities: Not assessed    IMPRESSION:   1. Chronic pain syndrome    2. Bilateral shoulder region arthritis    3. Localized osteoarthritis of knees, bilateral    4. DDD (degenerative disc disease), lumbosacral    5. Facet arthropathy, lumbosacral    6. Spondylolisthesis at L4-L5 level    7. Spinal stenosis of lumbar region, unspecified whether neurogenic claudication present    8. Arthritis of both feet    9. Osteoarthritis of right knee, unspecified osteoarthritis type    10. Sprain of left ankle, unspecified ligament, initial encounter    11. Morbid obesity ([address]    12.  Primary insomnia        PLAN:  Informed verbal consent regarding treatment was obtained  -Continue with Baclofen, Cymbalta, Diclofenac, Neurontin  -Currently not on opioids noting better pain relief on Neurontin  -Medrol dose pack  -Referral will be provided to Dr. Anabel Leahy for arthritis of the shoulder, patient requested not be sent back to Dr. Malini Cordero did not specify what her reasoning  -CBT techniques- relaxation therapies such as biofeedback, mindfulness based stress reduction, imagery, cognitive restructuring, problem solving discussed with patient  -She was advised weight reduction, diet changes- 800-1200 sierra diet, diet diary, exercising, nutritional  consult increased physical activity as tolerated  -Reviewed Covid-19 safety regulations which were discussed, patient maintains compliance with the safety guidelines regarding Covid-19  -Last UDS 8/6/20 Consistent  -Return in about 4 weeks (around 12/29/2020). Current Outpatient Medications   Medication Sig Dispense Refill    gabapentin (NEURONTIN) 300 MG capsule Take 1 capsule by mouth daily for 30 days. 30 capsule 0    Diclofenac Sodium POWD #5D Formula Diclo 3%, Lido 2%, Prilo 2% Pharm to comp: Apply to the shoulder twice a day 1 Bottle 1    DULoxetine (CYMBALTA) 30 MG extended release capsule Take 1 capsule by mouth daily 30 capsule 1    brompheniramine-pseudoephedrine-DM (BROMFED DM) 2-30-10 MG/5ML syrup Take 5 mLs by mouth 4 times daily as needed for Cough 240 mL 0    traZODone (DESYREL) 100 MG tablet Take 1 tablet by mouth nightly as needed for Sleep 30 tablet 1    benzonatate (TESSALON) 200 MG capsule TAKE 1 CAPSULE BY MOUTH TWICE DAILY AS NEEDED FOR COUGH 30 capsule 0    midodrine (PROAMATINE) 5 MG tablet Take 1 tablet by mouth 3 times daily 90 tablet 2    pravastatin (PRAVACHOL) 40 MG tablet Take 1 tablet by mouth daily 30 tablet 3    dermacerin (EUCERIN) CREA cream SMALL AMOUNT BID / PRN 1 Tube 0    vitamin D (ERGOCALCIFEROL) 1.25 MG (24418 UT) CAPS capsule Take 50,000 Units by mouth once a week      lidocaine (LIDODERM) 5 % Place 1 patch onto the skin daily 12 hours on, 12 hours off.       fluticasone (FLONASE) 50 MCG/ACT nasal spray 2 sprays by Nasal route daily as needed for Rhinitis 1 Bottle 1    Incontinence Supply Disposable (DEPEND UNDERWEAR LARGE/XL) MISC 1 Device by Does not apply route 4 times daily as needed (PRN) 120 each 2    silver sulfADIAZINE (SILVADENE) 1 % cream Apply topically daily 400 g 1    Misc. Devices (GEL-FOAM CUSHION) MISC 1 Device by Does not apply route daily 1 each 0    Blood Pressure Monitor RACHELL 1 capsule by Does not apply route daily 1 Device 0    amiodarone (CORDARONE) 200 MG tablet Take 200 mg by mouth 2 times daily     [address] by Does not apply route 1 each 0    Blood Pressure Monitor RACHELL 1 Device by Does not apply route daily 1 Device 0    aspirin (ASPIRIN LOW DOSE) 81 MG EC tablet Take 1 tablet by mouth daily 30 tablet 5    albuterol sulfate HFA (PROAIR HFA) 108 (90 Base) MCG/ACT inhaler Inhale 2 puffs into the lungs every 6 hours as needed for Shortness of Breath 1 Inhaler 0    Scooter MISC by Does not apply route 1 each 0    clopidogrel (PLAVIX) 75 MG tablet Take 1 tablet by mouth daily 30 tablet 3    Blood Pressure Monitor MISC 1 Device by Does not apply route as needed (prn) 1 each 0    Blood Pressure Monitoring (5 SERIES BP MONITOR) RACHELL 1 Device by Does not apply route as needed (PRN) 1 Device 0    calcium acetate (PHOSLO) 667 MG capsule Take by mouth 3 times daily (with meals)       No current facility-administered medications for this visit. I will continue her current medication regimen  which is part of the above treatment schedule. It has been helping with Ms. Monreal's chronic  medical problems which for this visit include:   Diagnoses of Chronic pain syndrome, Bilateral shoulder region arthritis, Localized osteoarthritis of knees, bilateral, DDD (degenerative disc disease), lumbosacral, Facet arthropathy, lumbosacral, Spondylolisthesis at L4-L5 level, Spinal stenosis of lumbar region, unspecified whether neurogenic claudication present, Arthritis of both feet, Osteoarthritis of right knee, unspecified osteoarthritis type, Sprain of left ankle, unspecified ligament, initial encounter, Morbid obesity ([address] and Primary insomnia were pertinent to this visit.    Risks and benefits of the medications and other alternative treatments including no treatment were discussed with the patient. The common side effects of these medications were also explained to the patient. Informed verbal consent was obtained. Goals of current treatment regimen include improvement in pain, restoration of functioning- with focus on improvement in physical performance, general activity, work or disability,emotional distress, health care utilization and  decreased medication consumption. Will continue to monitor progress towards achieving/maintaining therapeutic goals with special emphasis on  1. Improvement in perceived interfernce  of pain with ADL's. Ability to do home exercises independently. Ability to do household chores indoor and/or outdoor work and social and leisure activities. Improve psychosocial and physical functioning. - she is showing progression towards this treatment goal with the current regimen. She was advised against drinking alcohol with the narcotic pain medicines, advised against driving or handling machinery while adjusting the dose of medicines or if having cognitive  issues related to the current medications. Risk of overdose and death, if medicines not taken as prescribed, were also discussed. If the patient develops new symptoms or if the symptoms worsen, the patient should call the office. While transcribing every attempt was made to maintain the accuracy of the note in terms of it's contents,there may have been some errors made inadvertently. Thank you for allowing me to participate in the care of this patient. Manuelito Garcia.  Savage Multani APRN-CNP     Cc: Adan Ramos MD

## 2020-12-10 NOTE — TELEPHONE ENCOUNTER
Patient back is itching badly. She cant walk due to the itching and is in a hospital bed.  Please advise

## 2020-12-14 NOTE — TELEPHONE ENCOUNTER
I called pt got the v/m left message to see if pt had a  RASH ?    ADVISE MOSITURIZING SOAP AND LOTION  MAKE APPT IF PERSISTENT

## 2020-12-14 NOTE — TELEPHONE ENCOUNTER
Patient is requesting more therapy to assist with walking.  FYI: She is in dialysis Monday, Wednesday and Fridays

## 2020-12-16 NOTE — TELEPHONE ENCOUNTER
I called pt to let her know it was ok for physical therapy pt needs referral to start physical therapy with [facility]

## 2020-12-29 NOTE — PROGRESS NOTES
TELE HEALTH VISIT (AUDIO-VISUAL)    Pursuant to the emergency declaration under the [facility], [facility] waiver authority and the [employer] Act, this Virtual  Visit was conducted, with patient's consent, to reduce the patient's risk of exposure to COVID-19 and provide continuity of care for an established patient. Service is  provided through a video synchronous discussion virtually to substitute for in-person clinic visit. Due to this being a TeleHealth encounter (During EGDRR-21 public health emergency), evaluation of the following organ systems was limited: Vitals/Constitutional/EENT/Resp/CV/GI//MS/Neuro/Skin/Xjja-Micd-Xej. Anmol [facility]  1945  [id number]    Ms. Nevin Wiggins is being seen virtually for a follow up visit using Doxy. me/[employer] Video visit/[employer] or face time  Informed verbal consent to the virtual visit was obtained from Ms. Nevin Wiggins. Risks associated with HIPPA compliance with the virtual visit was explained to the patient. Ms. Nevin Wiggins is at her home and Anselmo RAMOS is in her office. HISTORY OF PRESENT ILLNESS:  Ms. Nevin Wiggins is a 66 y.o. female  being assessed for a follow up visit for pain management for evaluation of ongoing care regarding her symptoms and monitoring of compliance with long term use high risk medications. She has a diagnosis of   1. Chronic pain syndrome    2. Bilateral shoulder region arthritis    3. Localized osteoarthritis of knees, bilateral    4. DDD (degenerative disc disease), lumbosacral    5. Facet arthropathy, lumbosacral    6. Spondylolisthesis at L4-L5 level    7. Spinal stenosis of lumbar region, unspecified whether neurogenic claudication present    8. Arthritis of both feet    9. Osteoarthritis of right knee, unspecified osteoarthritis type    10.  Sprain of left ankle, unspecified ligament, initial encounter 11. Morbid obesity ([facility] [address]    12. Primary insomnia    13. At high risk for falls      On the Patients Pain Assessment form reviewed with the Medical Assistant:  She complains of pain in the bilateral shoulders . She rates the pain 9/10 and describes it as aching. Current treatment regimen has helped relieve about 20% of the pain. She denies any side effects from the current pain regimen. Patient reports that since the last follow up visit the physical functioning is unchanged, family/social relationships are unchanged, mood is unchanged sleep patterns are unchanged, and that the overall functioning is unchanged. Patient denies misusing/abusing her narcotic pain medications or using any illegal drugs. Upon obtaining medical history from Ms. Katiuska Sheppard states that pain is manageable on current pain therapy. Admits to pain in the right shoulder, plans to f/u with orthopedics for injections to the right shoulder. Pain medications provide adequate relief of pain, takes them as prescribed. Mood is stable without anxiety. Sleep is fair with an average of 5-6 hours. Denies to having issues of constipation. Tolerating activities/house chores with moderate tenderness to the lower back. ALLERGIES: Patients list of allergies were reviewed     MEDICATIONS: Ms. Katiuska Sheppard list of medications were reviewed. Her current medications are   Outpatient Medications Prior to Visit   Medication Sig Dispense Refill    Diclofenac Sodium POWD #5D Formula Diclo 3%, Lido 2%, Prilo 2% Pharm to comp: Apply to the shoulder twice a day 1 Bottle 1    DULoxetine (CYMBALTA) 30 MG extended release capsule Take 1 capsule by mouth daily 30 capsule 1    brompheniramine-pseudoephedrine-DM (BROMFED DM) 2-30-10 MG/5ML syrup Take 5 mLs by mouth 4 times daily as needed for Cough 240 mL 0    traZODone (DESYREL) 100 MG tablet Take 1 tablet by mouth nightly as needed for Sleep 30 tablet 1  benzonatate (TESSALON) 200 MG capsule TAKE 1 CAPSULE BY MOUTH TWICE DAILY AS NEEDED FOR COUGH 30 capsule 0    midodrine (PROAMATINE) 5 MG tablet Take 1 tablet by mouth 3 times daily 90 tablet 2    pravastatin (PRAVACHOL) 40 MG tablet Take 1 tablet by mouth daily 30 tablet 3    dermacerin (EUCERIN) CREA cream SMALL AMOUNT BID / PRN 1 Tube 0    vitamin D (ERGOCALCIFEROL) 1.25 MG (07652 UT) CAPS capsule Take 50,000 Units by mouth once a week      lidocaine (LIDODERM) 5 % Place 1 patch onto the skin daily 12 hours on, 12 hours off.  fluticasone (FLONASE) 50 MCG/ACT nasal spray 2 sprays by Nasal route daily as needed for Rhinitis 1 Bottle 1    Incontinence Supply Disposable (DEPEND UNDERWEAR LARGE/XL) MISC 1 Device by Does not apply route 4 times daily as needed (PRN) 120 each 2    silver sulfADIAZINE (SILVADENE) 1 % cream Apply topically daily 400 g 1    Misc.  Devices (GEL-FOAM CUSHION) MISC 1 Device by Does not apply route daily 1 each 0    Blood Pressure Monitor RACHELL 1 capsule by Does not apply route daily 1 Device 0    amiodarone (CORDARONE) 200 MG tablet Take 200 mg by mouth 2 times daily     [address] by Does not apply route 1 each 0    Blood Pressure Monitor RACHELL 1 Device by Does not apply route daily 1 Device 0    aspirin (ASPIRIN LOW DOSE) 81 MG EC tablet Take 1 tablet by mouth daily 30 tablet 5    albuterol sulfate HFA (PROAIR HFA) 108 (90 Base) MCG/ACT inhaler Inhale 2 puffs into the lungs every 6 hours as needed for Shortness of Breath 1 Inhaler 0    Scooter MISC by Does not apply route 1 each 0    clopidogrel (PLAVIX) 75 MG tablet Take 1 tablet by mouth daily 30 tablet 3    Blood Pressure Monitor MISC 1 Device by Does not apply route as needed (prn) 1 each 0    Blood Pressure Monitoring (5 SERIES BP MONITOR) RACHELL 1 Device by Does not apply route as needed (PRN) 1 Device 0    calcium acetate (PHOSLO) 667 MG capsule Take by mouth 3 times daily (with meals) -CBT techniques- relaxation therapies such as biofeedback, mindfulness based stress reduction, imagery, cognitive restructuring, problem solving discussed with patient  -Reviewed Covid-19 safety regulations which were discussed patient maintains compliance with the safety guidelines regarding Covid-19  -Last UDS 8/6/20 Consistent  -Return in about 4 weeks (around 1/26/2021). Current Outpatient Medications   Medication Sig Dispense Refill    gabapentin (NEURONTIN) 300 MG capsule Take 1 capsule by mouth daily for 30 days. 30 capsule 0    Diclofenac Sodium POWD #5D Formula Diclo 3%, Lido 2%, Prilo 2% Pharm to comp: Apply to the shoulder twice a day 1 Bottle 1    DULoxetine (CYMBALTA) 30 MG extended release capsule Take 1 capsule by mouth daily 30 capsule 1    brompheniramine-pseudoephedrine-DM (BROMFED DM) 2-30-10 MG/5ML syrup Take 5 mLs by mouth 4 times daily as needed for Cough 240 mL 0    traZODone (DESYREL) 100 MG tablet Take 1 tablet by mouth nightly as needed for Sleep 30 tablet 1    benzonatate (TESSALON) 200 MG capsule TAKE 1 CAPSULE BY MOUTH TWICE DAILY AS NEEDED FOR COUGH 30 capsule 0    midodrine (PROAMATINE) 5 MG tablet Take 1 tablet by mouth 3 times daily 90 tablet 2    pravastatin (PRAVACHOL) 40 MG tablet Take 1 tablet by mouth daily 30 tablet 3    dermacerin (EUCERIN) CREA cream SMALL AMOUNT BID / PRN 1 Tube 0    vitamin D (ERGOCALCIFEROL) 1.25 MG (62314 UT) CAPS capsule Take 50,000 Units by mouth once a week      lidocaine (LIDODERM) 5 % Place 1 patch onto the skin daily 12 hours on, 12 hours off.       fluticasone (FLONASE) 50 MCG/ACT nasal spray 2 sprays by Nasal route daily as needed for Rhinitis 1 Bottle 1    Incontinence Supply Disposable (DEPEND UNDERWEAR LARGE/XL) MISC 1 Device by Does not apply route 4 times daily as needed (PRN) 120 each 2    silver sulfADIAZINE (SILVADENE) 1 % cream Apply topically daily 400 g 1  Misc. Devices (GEL-FOAM CUSHION) MISC 1 Device by Does not apply route daily 1 each 0    Blood Pressure Monitor RACHELL 1 capsule by Does not apply route daily 1 Device 0    amiodarone (CORDARONE) 200 MG tablet Take 200 mg by mouth 2 times daily     [address] by Does not apply route 1 each 0    Blood Pressure Monitor RACHELL 1 Device by Does not apply route daily 1 Device 0    aspirin (ASPIRIN LOW DOSE) 81 MG EC tablet Take 1 tablet by mouth daily 30 tablet 5    albuterol sulfate HFA (PROAIR HFA) 108 (90 Base) MCG/ACT inhaler Inhale 2 puffs into the lungs every 6 hours as needed for Shortness of Breath 1 Inhaler 0    Scooter MISC by Does not apply route 1 each 0    clopidogrel (PLAVIX) 75 MG tablet Take 1 tablet by mouth daily 30 tablet 3    Blood Pressure Monitor MISC 1 Device by Does not apply route as needed (prn) 1 each 0    Blood Pressure Monitoring (5 SERIES BP MONITOR) RACHELL 1 Device by Does not apply route as needed (PRN) 1 Device 0    calcium acetate (PHOSLO) 667 MG capsule Take by mouth 3 times daily (with meals)       No current facility-administered medications for this visit. I will continue her current medication regimen  which is part of the above treatment schedule. It has been helping with Ms. Monreal's chronic  medical problems which for this visit include:   Diagnoses of Chronic pain syndrome, Bilateral shoulder region arthritis, Localized osteoarthritis of knees, bilateral, DDD (degenerative disc disease), lumbosacral, Facet arthropathy, lumbosacral, Spondylolisthesis at L4-L5 level, Spinal stenosis of lumbar region, unspecified whether neurogenic claudication present, Arthritis of both feet, Osteoarthritis of right knee, unspecified osteoarthritis type, Sprain of left ankle, unspecified ligament, initial encounter, Morbid obesity ([address] Primary insomnia, and At high risk for falls were pertinent to this visit. Risks and benefits of the medications and other alternative treatments  including no treatment were discussed with the patient. The common side effects of these medications were also explained to the patient. Informed verbal consent was obtained. Goals of current treatment regimen include improvement in pain, restoration of functioning- with focus on improvement in physical performance, general activity, work or disability,emotional distress, health care utilization and  decreased medication consumption. Will continue to monitor progress towards achieving/maintaining therapeutic goals with special emphasis on  1. Improvement in perceived interfernce  of pain with ADL's. Ability to do home exercises independently. Ability to do household chores indoor and/or outdoor work and social and leisure activities. Improve psychosocial and physical functioning. - she is showing progression towards this treatment goal with the current regimen. She was advised against drinking alcohol with the narcotic pain medicines, advised against driving or handling machinery while adjusting the dose of medicines or if having cognitive  issues related to the current medications. Risk of overdose and death, if medicines not taken as prescribed, were also discussed. If the patient develops new symptoms or if the symptoms worsen, the patient should call the office. While transcribing every attempt was made to maintain the accuracy of the note in terms of it's contents,there may have been some errors made inadvertently. Thank you for allowing me to participate in the care of this patient. Carolina Velasco.  Lisa SARMIENTO-CNP     Cc: Federica Martinez MD

## 2020-12-29 NOTE — PATIENT INSTRUCTIONS
Patient Education        Back Stretches: Exercises  Introduction  Here are some examples of exercises for stretching your back. Start each exercise slowly. Ease off the exercise if you start to have pain. Your doctor or physical therapist will tell you when you can start these exercises and which ones will work best for you. How to do the exercises  Overhead stretch   1. Stand comfortably with your feet shoulder-width apart. 2. Looking straight ahead, raise both arms over your head and reach toward the ceiling. Do not allow your head to tilt back. 3. Hold for 15 to 30 seconds, then lower your arms to your sides. 4. Repeat 2 to 4 times. Side stretch   1. Stand comfortably with your feet shoulder-width apart. 2. Raise one arm over your head, and then lean to the other side. 3. Slide your hand down your leg as you let the weight of your arm gently stretch your side muscles. Hold for 15 to 30 seconds. 4. Repeat 2 to 4 times on each side. Press-up   1. Lie on your stomach, supporting your body with your forearms. 2. Press your elbows down into the floor to raise your upper back. As you do this, relax your stomach muscles and allow your back to arch without using your back muscles. As your press up, do not let your hips or pelvis come off the floor. 3. Hold for 15 to 30 seconds, then relax. 4. Repeat 2 to 4 times. Relax and rest   1. Lie on your back with a rolled towel under your neck and a pillow under your knees. Extend your arms comfortably to your sides. 2. Relax and breathe normally. 3. Remain in this position for about 10 minutes. 4. If you can, do this 2 or 3 times each day. Follow-up care is a key part of your treatment and safety. Be sure to make and go to all appointments, and call your doctor if you are having problems. It's also a good idea to know your test results and keep a list of the medicines you take. Where can you learn more? Go to [email or web address] org and sign in to your [employer] account. Enter M180 in the [employer] box to learn more about \"Back Stretches: Exercises. \"     If you do not have an account, please click on the \"Sign Up Now\" link. Current as of: March 2, 2020               Content Version: 12.6  © 3012-1758 [employer]. Care instructions adapted under license by [facility] ([facility]). If you have questions about a medical condition or this instruction, always ask your healthcare professional. [address] any warranty or liability for your use of this information.

## 2021-01-01 ENCOUNTER — TELEPHONE (OUTPATIENT)
Dept: INTERNAL MEDICINE CLINIC | Age: 76
End: 2021-01-01

## 2021-01-01 ENCOUNTER — VIRTUAL VISIT (OUTPATIENT)
Dept: INTERNAL MEDICINE CLINIC | Age: 76
End: 2021-01-01
Payer: MEDICAID

## 2021-01-01 ENCOUNTER — VIRTUAL VISIT (OUTPATIENT)
Dept: PAIN MANAGEMENT | Age: 76
End: 2021-01-01
Payer: MEDICAID

## 2021-01-01 ENCOUNTER — NURSE TRIAGE (OUTPATIENT)
Dept: OTHER | Facility: CLINIC | Age: 76
End: 2021-01-01

## 2021-01-01 ENCOUNTER — TELEPHONE (OUTPATIENT)
Dept: PAIN MANAGEMENT | Age: 76
End: 2021-01-01

## 2021-01-01 ENCOUNTER — CARE COORDINATION (OUTPATIENT)
Dept: CARE COORDINATION | Age: 76
End: 2021-01-01

## 2021-01-01 DIAGNOSIS — M19.011 BILATERAL SHOULDER REGION ARTHRITIS: ICD-10-CM

## 2021-01-01 DIAGNOSIS — M47.817 FACET ARTHROPATHY, LUMBOSACRAL: ICD-10-CM

## 2021-01-01 DIAGNOSIS — E66.01 MORBID OBESITY (HCC): ICD-10-CM

## 2021-01-01 DIAGNOSIS — M19.072 ARTHRITIS OF BOTH FEET: ICD-10-CM

## 2021-01-01 DIAGNOSIS — M17.0 LOCALIZED OSTEOARTHRITIS OF KNEES, BILATERAL: ICD-10-CM

## 2021-01-01 DIAGNOSIS — M43.16 SPONDYLOLISTHESIS AT L4-L5 LEVEL: ICD-10-CM

## 2021-01-01 DIAGNOSIS — G89.4 CHRONIC PAIN SYNDROME: ICD-10-CM

## 2021-01-01 DIAGNOSIS — M48.061 SPINAL STENOSIS OF LUMBAR REGION, UNSPECIFIED WHETHER NEUROGENIC CLAUDICATION PRESENT: ICD-10-CM

## 2021-01-01 DIAGNOSIS — R56.9 SEIZURE (HCC): ICD-10-CM

## 2021-01-01 DIAGNOSIS — E55.9 VITAMIN D DEFICIENCY: ICD-10-CM

## 2021-01-01 DIAGNOSIS — M19.012 BILATERAL SHOULDER REGION ARTHRITIS: ICD-10-CM

## 2021-01-01 DIAGNOSIS — M19.071 ARTHRITIS OF BOTH FEET: ICD-10-CM

## 2021-01-01 DIAGNOSIS — F51.01 PRIMARY INSOMNIA: ICD-10-CM

## 2021-01-01 DIAGNOSIS — C18.0 ADENOCARCINOMA OF CECUM (HCC): ICD-10-CM

## 2021-01-01 DIAGNOSIS — M51.37 DDD (DEGENERATIVE DISC DISEASE), LUMBOSACRAL: ICD-10-CM

## 2021-01-01 DIAGNOSIS — M17.11 OSTEOARTHRITIS OF RIGHT KNEE, UNSPECIFIED OSTEOARTHRITIS TYPE: ICD-10-CM

## 2021-01-01 DIAGNOSIS — E78.5 HYPERLIPIDEMIA LDL GOAL <100: ICD-10-CM

## 2021-01-01 DIAGNOSIS — J30.89 OTHER ALLERGIC RHINITIS: ICD-10-CM

## 2021-01-01 DIAGNOSIS — Z91.81 AT HIGH RISK FOR FALLS: ICD-10-CM

## 2021-01-01 DIAGNOSIS — S93.402A SPRAIN OF LEFT ANKLE, UNSPECIFIED LIGAMENT, INITIAL ENCOUNTER: ICD-10-CM

## 2021-01-01 DIAGNOSIS — N18.6 ESRD (END STAGE RENAL DISEASE) (HCC): ICD-10-CM

## 2021-01-01 DIAGNOSIS — G89.29 CHRONIC RIGHT SHOULDER PAIN: Primary | ICD-10-CM

## 2021-01-01 DIAGNOSIS — R56.9 SEIZURE (HCC): Primary | ICD-10-CM

## 2021-01-01 DIAGNOSIS — R23.8 SKIN IRRITATION: ICD-10-CM

## 2021-01-01 DIAGNOSIS — R05.9 COUGH: ICD-10-CM

## 2021-01-01 DIAGNOSIS — M25.511 CHRONIC RIGHT SHOULDER PAIN: Primary | ICD-10-CM

## 2021-01-01 DIAGNOSIS — K59.09 OTHER CONSTIPATION: ICD-10-CM

## 2021-01-01 DIAGNOSIS — R10.9 LEFT SIDED ABDOMINAL PAIN: ICD-10-CM

## 2021-01-01 DIAGNOSIS — G47.09 OTHER INSOMNIA: ICD-10-CM

## 2021-01-01 DIAGNOSIS — I95.89 OTHER SPECIFIED HYPOTENSION: ICD-10-CM

## 2021-01-01 DIAGNOSIS — N89.8 VAGINAL ITCHING: ICD-10-CM

## 2021-01-01 DIAGNOSIS — L29.9 PRURITUS: ICD-10-CM

## 2021-01-01 DIAGNOSIS — K31.89 GASTRIC MASS: ICD-10-CM

## 2021-01-01 PROCEDURE — 99213 OFFICE O/P EST LOW 20 MIN: CPT | Performed by: NURSE PRACTITIONER

## 2021-01-01 PROCEDURE — 1111F DSCHRG MED/CURRENT MED MERGE: CPT | Performed by: INTERNAL MEDICINE

## 2021-01-01 PROCEDURE — 99214 OFFICE O/P EST MOD 30 MIN: CPT | Performed by: INTERNAL MEDICINE

## 2021-01-01 RX ORDER — PRAVASTATIN SODIUM 40 MG
40 TABLET ORAL DAILY
Qty: 30 TABLET | Refills: 1 | Status: SHIPPED | OUTPATIENT
Start: 2021-01-01 | End: 2021-01-01 | Stop reason: SDUPTHER

## 2021-01-01 RX ORDER — LEVETIRACETAM 500 MG/1
500 TABLET ORAL DAILY
COMMUNITY
End: 2021-01-01 | Stop reason: SDUPTHER

## 2021-01-01 RX ORDER — HYDROXYZINE HYDROCHLORIDE 25 MG/1
25 TABLET, FILM COATED ORAL EVERY 12 HOURS PRN
Qty: 30 TABLET | Refills: 0 | Status: SHIPPED | OUTPATIENT
Start: 2021-01-01 | End: 2021-01-01 | Stop reason: SDUPTHER

## 2021-01-01 RX ORDER — DIPHENHYDRAMINE HCL 25 MG
25 CAPSULE ORAL EVERY 6 HOURS PRN
COMMUNITY
End: 2021-01-01

## 2021-01-01 RX ORDER — BACLOFEN 10 MG/1
10 TABLET ORAL 2 TIMES DAILY
Qty: 60 TABLET | Refills: 0 | Status: SHIPPED | OUTPATIENT
Start: 2021-01-01 | End: 2021-01-01

## 2021-01-01 RX ORDER — DULOXETIN HYDROCHLORIDE 30 MG/1
30 CAPSULE, DELAYED RELEASE ORAL DAILY
Qty: 30 CAPSULE | Refills: 1 | Status: SHIPPED | OUTPATIENT
Start: 2021-01-01 | End: 2021-01-01

## 2021-01-01 RX ORDER — TRAMADOL HYDROCHLORIDE 50 MG/1
50 TABLET ORAL DAILY PRN
Qty: 30 TABLET | Refills: 0 | Status: SHIPPED | OUTPATIENT
Start: 2021-01-01 | End: 2021-01-01

## 2021-01-01 RX ORDER — ERGOCALCIFEROL 1.25 MG/1
50000 CAPSULE ORAL WEEKLY
Qty: 4 CAPSULE | Refills: 1 | Status: SHIPPED | OUTPATIENT
Start: 2021-01-01

## 2021-01-01 RX ORDER — HYDROXYZINE HYDROCHLORIDE 25 MG/1
25 TABLET, FILM COATED ORAL DAILY PRN
Qty: 14 TABLET | Refills: 0 | Status: SHIPPED | OUTPATIENT
Start: 2021-01-01 | End: 2021-01-01

## 2021-01-01 RX ORDER — TRAZODONE HYDROCHLORIDE 100 MG/1
TABLET ORAL
Qty: 30 TABLET | Refills: 1 | Status: SHIPPED | OUTPATIENT
Start: 2021-01-01 | End: 2021-01-01

## 2021-01-01 RX ORDER — HYDROXYZINE HYDROCHLORIDE 25 MG/1
25 TABLET, FILM COATED ORAL EVERY 12 HOURS PRN
Qty: 30 TABLET | Refills: 0 | Status: SHIPPED | OUTPATIENT
Start: 2021-01-01

## 2021-01-01 RX ORDER — LIDOCAINE 50 MG/G
1 PATCH TOPICAL DAILY
Qty: 30 PATCH | Refills: 1 | Status: SHIPPED | OUTPATIENT
Start: 2021-01-01 | End: 2021-01-01

## 2021-01-01 RX ORDER — LIDOCAINE 50 MG/G
1 PATCH TOPICAL DAILY
Qty: 30 PATCH | Refills: 1 | Status: SHIPPED | OUTPATIENT
Start: 2021-01-01 | End: 2021-01-01 | Stop reason: SDUPTHER

## 2021-01-01 RX ORDER — TRAZODONE HYDROCHLORIDE 100 MG/1
TABLET ORAL
Qty: 30 TABLET | Refills: 0 | Status: CANCELLED | OUTPATIENT
Start: 2021-01-01

## 2021-01-01 RX ORDER — DULOXETIN HYDROCHLORIDE 30 MG/1
30 CAPSULE, DELAYED RELEASE ORAL DAILY
Qty: 30 CAPSULE | Refills: 1 | Status: SHIPPED | OUTPATIENT
Start: 2021-01-01

## 2021-01-01 RX ORDER — POLYETHYLENE GLYCOL 3350 17 G/17G
17 POWDER, FOR SOLUTION ORAL DAILY PRN
Qty: 510 G | Refills: 5 | Status: SHIPPED | OUTPATIENT
Start: 2021-01-01 | End: 2021-01-01

## 2021-01-01 RX ORDER — GABAPENTIN 100 MG/1
200 CAPSULE ORAL NIGHTLY
Qty: 60 CAPSULE | Refills: 0 | Status: SHIPPED | OUTPATIENT
Start: 2021-01-01 | End: 2021-01-01

## 2021-01-01 RX ORDER — GABAPENTIN 300 MG/1
300 CAPSULE ORAL DAILY
Qty: 30 CAPSULE | Refills: 0 | Status: SHIPPED | OUTPATIENT
Start: 2021-01-01 | End: 2021-01-01

## 2021-01-01 RX ORDER — GABAPENTIN 400 MG/1
400 CAPSULE ORAL NIGHTLY
Qty: 30 CAPSULE | Refills: 0 | Status: SHIPPED | OUTPATIENT
Start: 2021-01-01 | End: 2021-01-01

## 2021-01-01 RX ORDER — LOPERAMIDE HYDROCHLORIDE 2 MG/1
2 CAPSULE ORAL PRN
COMMUNITY

## 2021-01-01 RX ORDER — GABAPENTIN 100 MG/1
CAPSULE ORAL
Qty: 60 CAPSULE | Refills: 0 | OUTPATIENT
Start: 2021-01-01

## 2021-01-01 RX ORDER — DULOXETIN HYDROCHLORIDE 30 MG/1
30 CAPSULE, DELAYED RELEASE ORAL DAILY
Qty: 30 CAPSULE | Refills: 1 | Status: SHIPPED | OUTPATIENT
Start: 2021-01-01 | End: 2021-01-01 | Stop reason: SDUPTHER

## 2021-01-01 RX ORDER — MIDODRINE HYDROCHLORIDE 5 MG/1
TABLET ORAL
Qty: 90 TABLET | Refills: 2 | Status: SHIPPED | OUTPATIENT
Start: 2021-01-01

## 2021-01-01 RX ORDER — HYDROXYZINE HYDROCHLORIDE 25 MG/1
25 TABLET, FILM COATED ORAL EVERY 12 HOURS PRN
Qty: 20 TABLET | Refills: 0 | Status: SHIPPED | OUTPATIENT
Start: 2021-01-01 | End: 2021-01-01

## 2021-01-01 RX ORDER — BENZONATATE 200 MG/1
200 CAPSULE ORAL 2 TIMES DAILY PRN
Qty: 30 CAPSULE | Refills: 0 | Status: SHIPPED | OUTPATIENT
Start: 2021-01-01

## 2021-01-01 RX ORDER — ERGOCALCIFEROL 1.25 MG/1
50000 CAPSULE ORAL WEEKLY
Qty: 4 CAPSULE | Refills: 1 | Status: SHIPPED | OUTPATIENT
Start: 2021-01-01 | End: 2021-01-01

## 2021-01-01 RX ORDER — FLUCONAZOLE 150 MG/1
150 TABLET ORAL ONCE
Qty: 2 TABLET | Refills: 0 | Status: SHIPPED | OUTPATIENT
Start: 2021-01-01 | End: 2021-01-01

## 2021-01-01 RX ORDER — MIDODRINE HYDROCHLORIDE 5 MG/1
TABLET ORAL
Qty: 90 TABLET | Refills: 2 | Status: SHIPPED | OUTPATIENT
Start: 2021-01-01 | End: 2021-01-01

## 2021-01-01 RX ORDER — GABAPENTIN 400 MG/1
400 CAPSULE ORAL NIGHTLY
Qty: 30 CAPSULE | Refills: 0 | Status: SHIPPED | OUTPATIENT
Start: 2021-01-01 | End: 2021-01-01 | Stop reason: SDUPTHER

## 2021-01-01 RX ORDER — PRAVASTATIN SODIUM 40 MG
40 TABLET ORAL DAILY
Qty: 30 TABLET | Refills: 1 | Status: SHIPPED | OUTPATIENT
Start: 2021-01-01

## 2021-01-01 RX ORDER — GABAPENTIN 400 MG/1
CAPSULE ORAL
Qty: 30 CAPSULE | Refills: 0 | Status: SHIPPED | OUTPATIENT
Start: 2021-01-01 | End: 2021-01-01

## 2021-01-01 RX ORDER — LEVETIRACETAM 500 MG/1
500 TABLET ORAL DAILY
Qty: 30 TABLET | Refills: 1 | Status: SHIPPED | OUTPATIENT
Start: 2021-01-01

## 2021-01-01 RX ORDER — LORATADINE 10 MG/1
TABLET ORAL
Qty: 30 TABLET | Refills: 1 | Status: SHIPPED | OUTPATIENT
Start: 2021-01-01

## 2021-01-01 SDOH — ECONOMIC STABILITY: FOOD INSECURITY: WITHIN THE PAST 12 MONTHS, YOU WORRIED THAT YOUR FOOD WOULD RUN OUT BEFORE YOU GOT MONEY TO BUY MORE.: NEVER TRUE

## 2021-01-01 SDOH — ECONOMIC STABILITY: FOOD INSECURITY: WITHIN THE PAST 12 MONTHS, THE FOOD YOU BOUGHT JUST DIDN'T LAST AND YOU DIDN'T HAVE MONEY TO GET MORE.: NEVER TRUE

## 2021-01-01 SDOH — ECONOMIC STABILITY: TRANSPORTATION INSECURITY
IN THE PAST 12 MONTHS, HAS LACK OF TRANSPORTATION KEPT YOU FROM MEETINGS, WORK, OR FROM GETTING THINGS NEEDED FOR DAILY LIVING?: NO

## 2021-01-01 SDOH — ECONOMIC STABILITY: INCOME INSECURITY: HOW HARD IS IT FOR YOU TO PAY FOR THE VERY BASICS LIKE FOOD, HOUSING, MEDICAL CARE, AND HEATING?: NOT HARD AT ALL

## 2021-01-01 SDOH — EDUCATIONAL SECURITY: EDUCATION ATTAINMENT: WHAT IS THE HIGHEST LEVEL OF SCHOOL YOU HAVE COMPLETED OR THE HIGHEST DEGREE YOU HAVE RECEIVED?: PATIENT REFUSED

## 2021-01-01 SDOH — ECONOMIC STABILITY: INCOME INSECURITY: HOW HARD IS IT FOR YOU TO PAY FOR THE VERY BASICS LIKE FOOD, HOUSING, MEDICAL CARE, AND HEATING?: NOT VERY HARD

## 2021-01-01 ASSESSMENT — PATIENT HEALTH QUESTIONNAIRE - PHQ9
SUM OF ALL RESPONSES TO PHQ QUESTIONS 1-9: 0
1. LITTLE INTEREST OR PLEASURE IN DOING THINGS: 0
SUM OF ALL RESPONSES TO PHQ QUESTIONS 1-9: 0
SUM OF ALL RESPONSES TO PHQ9 QUESTIONS 1 & 2: 0
SUM OF ALL RESPONSES TO PHQ QUESTIONS 1-9: 0
2. FEELING DOWN, DEPRESSED OR HOPELESS: 0
SUM OF ALL RESPONSES TO PHQ9 QUESTIONS 1 & 2: 0

## 2021-01-07 NOTE — TELEPHONE ENCOUNTER
Home Care nurse Jacinto requesting increase for patients RX gabapentin (NEURONTIN) 300 MG capsule to help with patients increased nerve pain.  Pl's advise

## 2021-01-08 NOTE — TELEPHONE ENCOUNTER
Spoke to brent and informed her that pat does not want to increase meds at this time but will talk with the patient about alternatives at her next appt

## 2021-01-19 NOTE — TELEPHONE ENCOUNTER
Patient requesting something for itching on her back she has been sweating and it causing her to iche due to her being bed ridden

## 2021-01-19 NOTE — TELEPHONE ENCOUNTER
Patient requesting something for itching on her back she has been sweating and it causing her to iche due to her being bed ridden.

## 2021-01-20 NOTE — CARE COORDINATION
Ambulatory Care Coordination Note  1/20/2021  CM Risk Score: 5  Charlson 10 Year Mortality Risk Score: 100%     ACC: Mary Alice Moore RN    Summary Note: Called patient's daughter to follow up with care, patient is at dialysis. Discussed with daughter patient's discharge from Care Coordination. Patient's CC needs are met. Patient has [facility], and DME in place. Patient has transportation for visits. Plan follow up PCP      Care Coordination Interventions    Program Enrollment: Complex Care  Referral from Primary Care Provider: No  Suggested Interventions and Community Resources  Fall Risk Prevention: Completed  Home Health Services: Completed  Occupational Therapy: Completed  Physical Therapy: Completed  Other Services or Interventions: Home Health needs are in home, also DME and pharmacy needs         Goals Addressed    None         Prior to Admission medications    Medication Sig Start Date End Date Taking? Authorizing Provider   traZODone (DESYREL) 100 MG tablet TAKE 1 TABLET BY MOUTH EVERY NIGHT AS NEEDED FOR SLEEP 1/19/21   Isela Martinez MD   silver sulfADIAZINE (SSD) 1 % cream APPLY EXTERNALLY TO THE AFFECTED AREA DAILY 1/19/21   Isela Martinez MD   loratadine (CLARITIN) 10 MG tablet TAKE 1 TABLET BY MOUTH DAILY AS NEEDED 1/19/21   Isela Martinez MD   DULoxetine (CYMBALTA) 30 MG extended release capsule Take 1 capsule by mouth daily 1/18/21   TORSTEN Shultz CNP   gabapentin (NEURONTIN) 300 MG capsule Take 1 capsule by mouth daily for 30 days.  12/29/20 1/28/21  TORSTEN Shultz CNP   Diclofenac Sodium POWD #5D Formula Diclo 3%, Lido 2%, Prilo 2% Pharm to comp: Apply to the shoulder twice a day 12/1/20   TORSTEN Shultz CNP   brompheniramine-pseudoephedrine-DM (BROMFED DM) 2-30-10 MG/5ML syrup Take 5 mLs by mouth 4 times daily as needed for Cough 11/12/20   Isela Martinez MD   benzonatate (TESSALON) 200 MG capsule TAKE 1 CAPSULE BY MOUTH TWICE DAILY AS NEEDED FOR COUGH 11/9/20   Satya Smith MD   midodrine (PROAMATINE) 5 MG tablet Take 1 tablet by mouth 3 times daily 10/27/20   Satya Smith MD   pravastatin (PRAVACHOL) 40 MG tablet Take 1 tablet by mouth daily 10/27/20   Satya Smith MD   dermacerin (EUCERIN) CREA cream SMALL AMOUNT BID / PRN 10/27/20   Satya Smith MD   vitamin D (ERGOCALCIFEROL) 1.25 MG (65831 UT) CAPS capsule Take 50,000 Units by mouth once a week    Historical Provider, MD   lidocaine (LIDODERM) 5 % Place 1 patch onto the skin daily 12 hours on, 12 hours off. Historical Provider, MD   fluticasone [facility]) 50 MCG/ACT nasal spray 2 sprays by Nasal route daily as needed for Rhinitis 7/30/20   Satya Smith MD   Incontinence Supply Disposable (DEPEND UNDERWEAR LARGE/XL) MISC 1 Device by Does not apply route 4 times daily as needed (PRN) 6/4/20   Satya Smith MD   Misc.  Devices (GEL-FOAM CUSHION) MISC 1 Device by Does not apply route daily 5/15/20   Satya Smith MD   Blood Pressure Monitor RACHELL 1 capsule by Does not apply route daily 4/28/20   Satya Smith MD   amiodarone (CORDARONE) 200 MG tablet Take 200 mg by mouth 2 times daily    Historical Provider, [address] [place] [address] by Does not apply route 4/9/20   Satya Smith MD   Blood Pressure Monitor RACHELL 1 Device by Does not apply route daily 4/9/20   Satya Smith MD   aspirin (ASPIRIN LOW DOSE) 81 MG EC tablet Take 1 tablet by mouth daily 7/23/19   Satya Smith MD   albuterol sulfate HFA (PROAIR HFA) 108 (90 Base) MCG/ACT inhaler Inhale 2 puffs into the lungs every 6 hours as needed for Shortness of Breath 6/25/19   MD Antonio Marsh MISC by Does not apply route 6/11/19   Satya Smith MD   clopidogrel (PLAVIX) 75 MG tablet Take 1 tablet by mouth daily 10/30/18   TORSTEN Felix - CNP   Blood Pressure Monitor MISC 1 Device by Does not apply route as needed (prn) 5/8/18   Satya Smith MD   Blood Pressure Monitoring (5 SERIES BP

## 2021-01-26 NOTE — PROGRESS NOTES
TELE HEALTH VISIT (AUDIO-VISUAL)    Pursuant to the emergency declaration under the [facility] waiver authority and the [employer] Act, this Virtual  Visit was conducted, with patient's consent, to reduce the patient's risk of exposure to COVID-19 and provide continuity of care for an established patient. Service is  provided through a video synchronous discussion virtually to substitute for in-person clinic visit. Due to this being a TeleHealth encounter (During YOUJV-66 public health emergency), evaluation of the following organ systems was limited: Vitals/Constitutional/EENT/Resp/CV/GI//MS/Neuro/Skin/Kfed-Kxig-Nco. Diane Chad  1945  [id number]    Ms. Mary Gonzalez is being seen virtually for a follow up visit using Doxy. me/[email or web address] Video visit/[employer] or face time  Informed verbal consent to the virtual visit was obtained from Ms. Mary Gonzalez. Risks associated with HIPPA compliance with the virtual visit was explained to the patient. Ms. Mary Gonzalez is at her home and [facility]. [address] [facility] is in her office. HISTORY OF PRESENT ILLNESS:  Ms. Mary Gonzalez is a 66 y.o. female  being assessed for a follow up visit for pain management for evaluation of ongoing care regarding her symptoms and monitoring of compliance with long term use high risk medications. She has a diagnosis of   1. Chronic pain syndrome    2. Bilateral shoulder region arthritis    3. Spinal stenosis of lumbar region, unspecified whether neurogenic claudication present    4. DDD (degenerative disc disease), lumbosacral    5. Facet arthropathy, lumbosacral    6. Localized osteoarthritis of knees, bilateral    7.  Arthritis of both feet      On the Patients Pain Assessment form reviewed with the Medical Assistant: She complains of pain in the right shoulder(s): entire limb . She rates the pain 9/10 and describes it as aching. Current treatment regimen has helped relieve about 0% of the pain. She denies any side effects from the current pain regimen. Patient reports that since the last follow up visit the physical functioning is unchanged, family/social relationships are unchanged, mood is unchanged sleep patterns are unchanged, and that the overall functioning is unchanged. Patient denies misusing/abusing her narcotic pain medications or using any illegal drugs. Upon obtaining medical history from Ms. Heber Narvaez states that pain is manageable on current pain therapy. Takes pain medications as prescribed. Tenderness to the shoulders, currently still in PT. Mood is stable without anxiety. Sleep is fair with an average of 5-6 hours. Denies to having issues of constipation. Tolerating activities/house chores with moderate tenderness to the lower back/shoulders. ALLERGIES: Patients list of allergies were reviewed     MEDICATIONS: Ms. Heber Narvaez list of medications were reviewed. Her current medications are   Outpatient Medications Prior to Visit   Medication Sig Dispense Refill    traZODone (DESYREL) 100 MG tablet TAKE 1 TABLET BY MOUTH EVERY NIGHT AS NEEDED FOR SLEEP 30 tablet 1    silver sulfADIAZINE (SSD) 1 % cream APPLY EXTERNALLY TO THE AFFECTED AREA DAILY 400 g 0    loratadine (CLARITIN) 10 MG tablet TAKE 1 TABLET BY MOUTH DAILY AS NEEDED 30 tablet 1    DULoxetine (CYMBALTA) 30 MG extended release capsule Take 1 capsule by mouth daily 30 capsule 1    Diclofenac Sodium POWD #5D Formula Diclo 3%, Lido 2%, Prilo 2% Pharm to comp: Apply to the shoulder twice a day 1 Bottle 1    brompheniramine-pseudoephedrine-DM (BROMFED DM) 2-30-10 MG/5ML syrup Take 5 mLs by mouth 4 times daily as needed for Cough 240 mL 0  benzonatate (TESSALON) 200 MG capsule TAKE 1 CAPSULE BY MOUTH TWICE DAILY AS NEEDED FOR COUGH 30 capsule 0    midodrine (PROAMATINE) 5 MG tablet Take 1 tablet by mouth 3 times daily 90 tablet 2    pravastatin (PRAVACHOL) 40 MG tablet Take 1 tablet by mouth daily 30 tablet 3    dermacerin (EUCERIN) CREA cream SMALL AMOUNT BID / PRN 1 Tube 0    vitamin D (ERGOCALCIFEROL) 1.25 MG (22127 UT) CAPS capsule Take 50,000 Units by mouth once a week      fluticasone (FLONASE) 50 MCG/ACT nasal spray 2 sprays by Nasal route daily as needed for Rhinitis 1 Bottle 1    Incontinence Supply Disposable (DEPEND UNDERWEAR LARGE/XL) MISC 1 Device by Does not apply route 4 times daily as needed (PRN) 120 each 2    Misc. Devices (GEL-FOAM CUSHION) MISC 1 Device by Does not apply route daily 1 each 0    Blood Pressure Monitor RACHELL 1 capsule by Does not apply route daily 1 Device 0    amiodarone (CORDARONE) 200 MG tablet Take 200 mg by mouth 2 times daily     [address] by Does not apply route 1 each 0    Blood Pressure Monitor RACHELL 1 Device by Does not apply route daily 1 Device 0    aspirin (ASPIRIN LOW DOSE) 81 MG EC tablet Take 1 tablet by mouth daily 30 tablet 5    albuterol sulfate HFA (PROAIR HFA) 108 (90 Base) MCG/ACT inhaler Inhale 2 puffs into the lungs every 6 hours as needed for Shortness of Breath 1 Inhaler 0    Scooter MISC by Does not apply route 1 each 0    clopidogrel (PLAVIX) 75 MG tablet Take 1 tablet by mouth daily 30 tablet 3    Blood Pressure Monitor MISC 1 Device by Does not apply route as needed (prn) 1 each 0    Blood Pressure Monitoring (5 SERIES BP MONITOR) RACHELL 1 Device by Does not apply route as needed (PRN) 1 Device 0    calcium acetate (PHOSLO) 667 MG capsule Take by mouth 3 times daily (with meals)      gabapentin (NEURONTIN) 300 MG capsule Take 1 capsule by mouth daily for 30 days.  30 capsule 0 -Return in about 4 weeks (around 2/23/2021). -[place] record was obtained and reviewed  for the last one year and no indicators of drug misuse  were found. Any other controlled substance prescriptions  seen on the record have been accounted for, I am aware of the patient receiving these medications. Kathy Webster [place] record will be rechecked as part of office protocol. Current Outpatient Medications   Medication Sig Dispense Refill    gabapentin (NEURONTIN) 300 MG capsule Take 1 capsule by mouth daily for 30 days. 30 capsule 0    lidocaine (LIDODERM) 5 % Place 1 patch onto the skin daily 12 hours on, 12 hours off.  30 patch 1    traZODone (DESYREL) 100 MG tablet TAKE 1 TABLET BY MOUTH EVERY NIGHT AS NEEDED FOR SLEEP 30 tablet 1    silver sulfADIAZINE (SSD) 1 % cream APPLY EXTERNALLY TO THE AFFECTED AREA DAILY 400 g 0    loratadine (CLARITIN) 10 MG tablet TAKE 1 TABLET BY MOUTH DAILY AS NEEDED 30 tablet 1    DULoxetine (CYMBALTA) 30 MG extended release capsule Take 1 capsule by mouth daily 30 capsule 1    Diclofenac Sodium POWD #5D Formula Diclo 3%, Lido 2%, Prilo 2% Pharm to comp: Apply to the shoulder twice a day 1 Bottle 1    brompheniramine-pseudoephedrine-DM (BROMFED DM) 2-30-10 MG/5ML syrup Take 5 mLs by mouth 4 times daily as needed for Cough 240 mL 0    benzonatate (TESSALON) 200 MG capsule TAKE 1 CAPSULE BY MOUTH TWICE DAILY AS NEEDED FOR COUGH 30 capsule 0    midodrine (PROAMATINE) 5 MG tablet Take 1 tablet by mouth 3 times daily 90 tablet 2    pravastatin (PRAVACHOL) 40 MG tablet Take 1 tablet by mouth daily 30 tablet 3    dermacerin (EUCERIN) CREA cream SMALL AMOUNT BID / PRN 1 Tube 0    vitamin D (ERGOCALCIFEROL) 1.25 MG (77102 UT) CAPS capsule Take 50,000 Units by mouth once a week      fluticasone (FLONASE) 50 MCG/ACT nasal spray 2 sprays by Nasal route daily as needed for Rhinitis 1 Bottle 1  Incontinence Supply Disposable (DEPEND UNDERWEAR LARGE/XL) MISC 1 Device by Does not apply route 4 times daily as needed (PRN) 120 each 2    Misc. Devices (GEL-FOAM CUSHION) MISC 1 Device by Does not apply route daily 1 each 0    Blood Pressure Monitor RACHELL 1 capsule by Does not apply route daily 1 Device 0    amiodarone (CORDARONE) 200 MG tablet Take 200 mg by mouth 2 times daily     [address] by Does not apply route 1 each 0    Blood Pressure Monitor RACHELL 1 Device by Does not apply route daily 1 Device 0    aspirin (ASPIRIN LOW DOSE) 81 MG EC tablet Take 1 tablet by mouth daily 30 tablet 5    albuterol sulfate HFA (PROAIR HFA) 108 (90 Base) MCG/ACT inhaler Inhale 2 puffs into the lungs every 6 hours as needed for Shortness of Breath 1 Inhaler 0    Scooter MISC by Does not apply route 1 each 0    clopidogrel (PLAVIX) 75 MG tablet Take 1 tablet by mouth daily 30 tablet 3    Blood Pressure Monitor MISC 1 Device by Does not apply route as needed (prn) 1 each 0    Blood Pressure Monitoring (5 SERIES BP MONITOR) RACHELL 1 Device by Does not apply route as needed (PRN) 1 Device 0    calcium acetate (PHOSLO) 667 MG capsule Take by mouth 3 times daily (with meals)      hydrOXYzine (ATARAX) 25 MG tablet Take 1 tablet by mouth daily as needed for Itching 14 tablet 0     No current facility-administered medications for this visit. I will continue her current medication regimen  which is part of the above treatment schedule. It has been helping with Ms. Monreal's chronic  medical problems which for this visit include:   Diagnoses of Chronic pain syndrome, Bilateral shoulder region arthritis, Spinal stenosis of lumbar region, unspecified whether neurogenic claudication present, DDD (degenerative disc disease), lumbosacral, Facet arthropathy, lumbosacral, Localized osteoarthritis of knees, bilateral, and Arthritis of both feet were pertinent to this visit. Risks and benefits of the medications and other alternative treatments  including no treatment were discussed with the patient. The common side effects of these medications were also explained to the patient. Informed verbal consent was obtained. Goals of current treatment regimen include improvement in pain, restoration of functioning- with focus on improvement in physical performance, general activity, work or disability,emotional distress, health care utilization and  decreased medication consumption. Will continue to monitor progress towards achieving/maintaining therapeutic goals with special emphasis on  1. Improvement in perceived interfernce  of pain with ADL's. Ability to do home exercises independently. Ability to do household chores indoor and/or outdoor work and social and leisure activities. Improve psychosocial and physical functioning. - she is showing progression towards this treatment goal with the current regimen. She was advised against drinking alcohol with the narcotic pain medicines, advised against driving or handling machinery while adjusting the dose of medicines or if having cognitive  issues related to the current medications. Risk of overdose and death, if medicines not taken as prescribed, were also discussed. If the patient develops new symptoms or if the symptoms worsen, the patient should call the office. While transcribing every attempt was made to maintain the accuracy of the note in terms of it's contents,there may have been some errors made inadvertently. Thank you for allowing me to participate in the care of this patient. Derek Posada.  Ivy Galeazzi APRN-DARION     Cc: Arabella Turner MD

## 2021-01-27 NOTE — TELEPHONE ENCOUNTER
I spoke with the patient and her back is still itching badly. The medication loratadin did not work for her itching. Please prescribe another medication.

## 2021-02-11 NOTE — TELEPHONE ENCOUNTER
Patient is requesting a prescription for a new mattress for her hospital bed that she received from [facility] in [address] patient is needing prescription sent there please advise.

## 2021-02-11 NOTE — TELEPHONE ENCOUNTER
Patient is requesting a prescription for a new mattress for her hospital bed that she received from [facility] in [address] patient is needing prescription sent there please advise

## 2021-02-15 NOTE — TELEPHONE ENCOUNTER
----- Message from Cristina Baxter sent at 2/15/2021 10:52 AM EST -----  Subject: Refill Request    QUESTIONS  Name of Medication? hydrOXYzine (ATARAX) 25 MG tablet  Patient-reported dosage and instructions? 25 mg/ once- daily  How many days do you have left? 0  [facility]erred [facility]? [id number]  Pharmacy phone number (if available)? [phone]  Additional Information for Provider? following up on pharmacy request.  ---------------------------------------------------------------------------  --------------  CALL BACK INFO  What is the best way for the office to contact you? OK to leave message on   voicemail  Preferred Call Back Phone Number?  [phone]

## 2021-02-16 NOTE — TELEPHONE ENCOUNTER
----- Message from Cora Johnson sent at 2/15/2021 10:47 AM EST -----  Subject: Message to Provider    QUESTIONS  Information for Provider? pt. is asking to spend some time filling out   FMLA paper for aides that dont show up and so daughter can stay home with   her.  ---------------------------------------------------------------------------  --------------  [address]  What is the best way for the office to contact you? OK to leave message on   voicemail  Preferred Call Back Phone Number? [phone]  ---------------------------------------------------------------------------  --------------  SCRIPT ANSWERS  Relationship to Patient? Other  Representative Name? Henrietta Isabella  Is the [facility] on the appropriate HIPAA document in Epic?  Yes

## 2021-02-16 NOTE — TELEPHONE ENCOUNTER
----- Message from Desmond Britton sent at 2/15/2021 10:47 AM EST -----  Subject: Message to Provider    QUESTIONS  Information for Provider? pt. is asking to spend some time filling out   FMLA paper for aides that dont show up and so daughter can stay home with   her.  ---------------------------------------------------------------------------  --------------  [address]  What is the best way for the office to contact you? OK to leave message on   voicemail  Preferred Call Back Phone Number? [phone]  ---------------------------------------------------------------------------  --------------  SCRIPT ANSWERS  Relationship to Patient? Other  Representative Name? Dulcy Scheuermann  Is the [facility] on the appropriate HIPAA document in Epic?  Yes

## 2021-02-16 NOTE — TELEPHONE ENCOUNTER
----- Message from Yen Roberts sent at 2/15/2021 10:52 AM EST -----  Subject: Refill Request    QUESTIONS  Name of Medication? hydrOXYzine (ATARAX) 25 MG tablet  Patient-reported dosage and instructions? 25 mg/ once- daily  How many days do you have left? 0  [facility]? [id number]  Pharmacy phone number (if available)? [phone]  Additional Information for Provider? following up on pharmacy request.  ---------------------------------------------------------------------------  --------------  CALL BACK INFO  What is the best way for the office to contact you? OK to leave message on   voicemail  Preferred Call Back Phone Number?  [phone]

## 2021-02-16 NOTE — TELEPHONE ENCOUNTER
Pt request refill on hydrOXYzine (ATARAX) 25 MG tablet   Patient-reported dosage and instructions?  25 mg/ once- daily   How many days do you have left? 0 equesting refill on

## 2021-02-16 NOTE — TELEPHONE ENCOUNTER
I called and spoke to pt daughter to let her know that she needs to make her mom an appt to get fmla paper work filled out for her self to care for her mom pt daughter understood and will call to make appt

## 2021-02-22 NOTE — TELEPHONE ENCOUNTER
----- Message from Brimhall Nizhoni Micha sent at 2/22/2021 10:42 AM EST -----  Subject: Message to Provider    QUESTIONS  Information for Provider? Returning call   Dr Kendy Vance sent over a form for a mattress bed   PT double checked with [facility] Pharmacist in [place]. they said   they have not received anything.   ---------------------------------------------------------------------------  --------------  CALL BACK INFO  What is the best way for the office to contact you? OK to leave message on   voicemail  Preferred Call Back Phone Number? [phone]  ---------------------------------------------------------------------------  --------------  SCRIPT ANSWERS  Relationship to Patient? Other  Representative Name? Viviane Parish  Is the Representative on the appropriate HIPAA document in Epic?  Yes

## 2021-02-22 NOTE — TELEPHONE ENCOUNTER
I called pt to let her know that her rx for her mattress was faxed on 2-15 [facility] pt understood.      Pt also wants you to know that her back is still itching and is dry pt would like something for it

## 2021-02-23 NOTE — TELEPHONE ENCOUNTER
Patient is requesting hydroxyzine for her itch. Please send to [facility] [address]. She is requesting a referral for a new mattress.  Please advise

## 2021-02-24 NOTE — TELEPHONE ENCOUNTER
I called pt to let her know that med was sent prn and that mattress was sent to [facility] on 2-11 as we dicussed last week got the v/m left message

## 2021-02-24 NOTE — TELEPHONE ENCOUNTER
I called pt to le there know that she has to f/u with pain management for back pain and that mattress was re faxed to [facility]

## 2021-02-24 NOTE — TELEPHONE ENCOUNTER
FYI Patient wanted physician to know that [facility] told her that she had sciatic nerve pain, and she wanted physician to know because it is very painful.

## 2021-02-24 NOTE — TELEPHONE ENCOUNTER
Patient wanted physician to told that they told her at [facility] that she had sciatic nerve pain and she wanted physician to know because it is very painful. mamadou

## 2021-02-25 NOTE — PROGRESS NOTES
11. Morbid obesity ([address]    12. Primary insomnia    13. At high risk for falls      On the Patients Pain Assessment form reviewed with the Medical Assistant:  She complains of pain in the right shoulder(s): entire area  And left thigh . She rates the pain 8/10 and describes it as sharp, aching, burning. Current treatment regimen has helped relieve about 0% of the pain. She denies any side effects from the current pain regimen. Patient reports that since the last follow up visit the physical functioning is unchanged, family/social relationships are unchanged, mood is unchanged sleep patterns are unchanged, and that the overall functioning is unchanged. Patient denies misusing/abusing her narcotic pain medications or using any illegal drugs. Upon obtaining medical history from Ms. Caity Hatfield states that pain is manageable on current pain therapy. Reports having sciatica pain, Neurontin seemed to help with the pain initially. Continues to receive home nursing care. Mood is stable without anxiety. Sleep is fair with an average of 5-6 hours. Denies to having issues of constipation. Tolerating activities/house chores with moderate tenderness to the lower back. ALLERGIES: Patients list of allergies were reviewed     MEDICATIONS: Ms. Caity Hatfield list of medications were reviewed. Her current medications are   Outpatient Medications Prior to Visit   Medication Sig Dispense Refill    hydrOXYzine (ATARAX) 25 MG tablet Take 1 tablet by mouth every 12 hours as needed for Itching 20 tablet 0    traZODone (DESYREL) 100 MG tablet TAKE 1 TABLET BY MOUTH EVERY NIGHT AS NEEDED FOR SLEEP 30 tablet 1    silver sulfADIAZINE (SSD) 1 % cream APPLY EXTERNALLY TO THE AFFECTED AREA DAILY 400 g 0    loratadine (CLARITIN) 10 MG tablet TAKE 1 TABLET BY MOUTH DAILY AS NEEDED 30 tablet 1    DULoxetine (CYMBALTA) 30 MG extended release capsule Take 1 capsule by mouth daily 30 capsule 1  Diclofenac Sodium POWD #5D Formula Diclo 3%, Lido 2%, Prilo 2% Pharm to comp: Apply to the shoulder twice a day 1 Bottle 1    brompheniramine-pseudoephedrine-DM (BROMFED DM) 2-30-10 MG/5ML syrup Take 5 mLs by mouth 4 times daily as needed for Cough 240 mL 0    benzonatate (TESSALON) 200 MG capsule TAKE 1 CAPSULE BY MOUTH TWICE DAILY AS NEEDED FOR COUGH 30 capsule 0    midodrine (PROAMATINE) 5 MG tablet Take 1 tablet by mouth 3 times daily 90 tablet 2    pravastatin (PRAVACHOL) 40 MG tablet Take 1 tablet by mouth daily 30 tablet 3    dermacerin (EUCERIN) CREA cream SMALL AMOUNT BID / PRN 1 Tube 0    vitamin D (ERGOCALCIFEROL) 1.25 MG (30380 UT) CAPS capsule Take 50,000 Units by mouth once a week      fluticasone (FLONASE) 50 MCG/ACT nasal spray 2 sprays by Nasal route daily as needed for Rhinitis 1 Bottle 1    Incontinence Supply Disposable (DEPEND UNDERWEAR LARGE/XL) MISC 1 Device by Does not apply route 4 times daily as needed (PRN) 120 each 2    Misc.  Devices (GEL-FOAM CUSHION) MISC 1 Device by Does not apply route daily 1 each 0    Blood Pressure Monitor RACHELL 1 capsule by Does not apply route daily 1 Device 0    amiodarone (CORDARONE) 200 MG tablet Take 200 mg by mouth 2 times daily     [address] by Does not apply route 1 each 0    Blood Pressure Monitor RACHELL 1 Device by Does not apply route daily 1 Device 0    aspirin (ASPIRIN LOW DOSE) 81 MG EC tablet Take 1 tablet by mouth daily 30 tablet 5    albuterol sulfate HFA (PROAIR HFA) 108 (90 Base) MCG/ACT inhaler Inhale 2 puffs into the lungs every 6 hours as needed for Shortness of Breath 1 Inhaler 0    Scooter MISC by Does not apply route 1 each 0    clopidogrel (PLAVIX) 75 MG tablet Take 1 tablet by mouth daily 30 tablet 3    Blood Pressure Monitor MISC 1 Device by Does not apply route as needed (prn) 1 each 0    Blood Pressure Monitoring (5 SERIES BP MONITOR) RACHELL 1 Device by Does not apply route as needed (PRN) 1 Device 0  calcium acetate (PHOSLO) 667 MG capsule Take by mouth 3 times daily (with meals)      gabapentin (NEURONTIN) 300 MG capsule Take 1 capsule by mouth daily for 30 days. 30 capsule 0    lidocaine (LIDODERM) 5 % Place 1 patch onto the skin daily 12 hours on, 12 hours off. 30 patch 1     No facility-administered medications prior to visit. SOCIAL/FAMILY/PAST MEDICAL HISTORY: Ms. Hernandez Like, family and past medical history was reviewed. REVIEW OF SYSTEMS:    Respiratory: Negative for apnea, chest tightness and shortness of breath or change in baseline breathing. Gastrointestinal: Negative for nausea, vomiting, abdominal pain, diarrhea, constipation, blood in stool and abdominal distention. PHYSICAL EXAM:   Nursing note and vitals reviewed. LMP 01/21/1964  as per patient  Constitutional: She appears well-developed and well-nourished. No acute distress. Skin: Skin appears to be warm and dry. No rashes or any other marks noted. She is not diaphoretic. Neurological/Psychiatric:She is alert and oriented to person, place, and time. Coordination is  normal.  Her mood isAppropriate and affect is Neutral/Euthymic(normal). Her behavior is normal.   thought content normal.   Musculoskeletal / Extremities:    IMPRESSION:   1. Chronic pain syndrome    2. Bilateral shoulder region arthritis    3. Spinal stenosis of lumbar region, unspecified whether neurogenic claudication present    4. DDD (degenerative disc disease), lumbosacral    5. Facet arthropathy, lumbosacral    6. Localized osteoarthritis of knees, bilateral    7. Arthritis of both feet    8. Spondylolisthesis at L4-L5 level    9. Osteoarthritis of right knee, unspecified osteoarthritis type    10. Sprain of left ankle, unspecified ligament, initial encounter    11. Morbid obesity ([address]    12. Primary insomnia    13.  At high risk for falls        PLAN:  Informed verbal consent regarding treatment was obtained -Continue with Baclofen, Cymbalta, Diclofenac, Neurontin  -Neurontin 400 mg nighlty  -Jeimy exercises  -Recommended patient come to the office for Lumbar TPI, reports she will call when she is able to arrange transportation  -CBT techniques- relaxation therapies such as biofeedback, mindfulness based stress reduction, imagery, cognitive restructuring, problem solving discussed with patient  -Last UDS 8/6/20 Consistent  -Return in about 4 weeks (around 3/25/2021). Current Outpatient Medications   Medication Sig Dispense Refill    lidocaine (LIDODERM) 5 % Place 1 patch onto the skin daily 12 hours on, 12 hours off. 30 patch 1    gabapentin (NEURONTIN) 400 MG capsule Take 1 capsule by mouth nightly for 30 days.  30 capsule 0    hydrOXYzine (ATARAX) 25 MG tablet Take 1 tablet by mouth every 12 hours as needed for Itching 20 tablet 0    traZODone (DESYREL) 100 MG tablet TAKE 1 TABLET BY MOUTH EVERY NIGHT AS NEEDED FOR SLEEP 30 tablet 1    silver sulfADIAZINE (SSD) 1 % cream APPLY EXTERNALLY TO THE AFFECTED AREA DAILY 400 g 0    loratadine (CLARITIN) 10 MG tablet TAKE 1 TABLET BY MOUTH DAILY AS NEEDED 30 tablet 1    DULoxetine (CYMBALTA) 30 MG extended release capsule Take 1 capsule by mouth daily 30 capsule 1    Diclofenac Sodium POWD #5D Formula Diclo 3%, Lido 2%, Prilo 2% Pharm to comp: Apply to the shoulder twice a day 1 Bottle 1    brompheniramine-pseudoephedrine-DM (BROMFED DM) 2-30-10 MG/5ML syrup Take 5 mLs by mouth 4 times daily as needed for Cough 240 mL 0    benzonatate (TESSALON) 200 MG capsule TAKE 1 CAPSULE BY MOUTH TWICE DAILY AS NEEDED FOR COUGH 30 capsule 0    midodrine (PROAMATINE) 5 MG tablet Take 1 tablet by mouth 3 times daily 90 tablet 2    pravastatin (PRAVACHOL) 40 MG tablet Take 1 tablet by mouth daily 30 tablet 3    dermacerin (EUCERIN) CREA cream SMALL AMOUNT BID / PRN 1 Tube 0  vitamin D (ERGOCALCIFEROL) 1.25 MG (88512 UT) CAPS capsule Take 50,000 Units by mouth once a week      fluticasone (FLONASE) 50 MCG/ACT nasal spray 2 sprays by Nasal route daily as needed for Rhinitis 1 Bottle 1    Incontinence Supply Disposable (DEPEND UNDERWEAR LARGE/XL) MISC 1 Device by Does not apply route 4 times daily as needed (PRN) 120 each 2    Misc. Devices (GEL-FOAM CUSHION) MISC 1 Device by Does not apply route daily 1 each 0    Blood Pressure Monitor RACHELL 1 capsule by Does not apply route daily 1 Device 0    amiodarone (CORDARONE) 200 MG tablet Take 200 mg by mouth 2 times daily     [address] by Does not apply route 1 each 0    Blood Pressure Monitor RACHELL 1 Device by Does not apply route daily 1 Device 0    aspirin (ASPIRIN LOW DOSE) 81 MG EC tablet Take 1 tablet by mouth daily 30 tablet 5    albuterol sulfate HFA (PROAIR HFA) 108 (90 Base) MCG/ACT inhaler Inhale 2 puffs into the lungs every 6 hours as needed for Shortness of Breath 1 Inhaler 0    Scooter MISC by Does not apply route 1 each 0    clopidogrel (PLAVIX) 75 MG tablet Take 1 tablet by mouth daily 30 tablet 3    Blood Pressure Monitor MISC 1 Device by Does not apply route as needed (prn) 1 each 0    Blood Pressure Monitoring (5 SERIES BP MONITOR) RACHELL 1 Device by Does not apply route as needed (PRN) 1 Device 0    calcium acetate (PHOSLO) 667 MG capsule Take by mouth 3 times daily (with meals)       No current facility-administered medications for this visit. I will continue her current medication regimen  which is part of the above treatment schedule. It has been helping with Ms. Mnoreal's chronic  medical problems which for this visit include: Diagnoses of Chronic pain syndrome, Bilateral shoulder region arthritis, Spinal stenosis of lumbar region, unspecified whether neurogenic claudication present, DDD (degenerative disc disease), lumbosacral, Facet arthropathy, lumbosacral, Localized osteoarthritis of knees, bilateral, Arthritis of both feet, Spondylolisthesis at L4-L5 level, Osteoarthritis of right knee, unspecified osteoarthritis type, Sprain of left ankle, unspecified ligament, initial encounter, Morbid obesity ([address] Primary insomnia, and At high risk for falls were pertinent to this visit. Risks and benefits of the medications and other alternative treatments  including no treatment were discussed with the patient. The common side effects of these medications were also explained to the patient. Informed verbal consent was obtained. Goals of current treatment regimen include improvement in pain, restoration of functioning- with focus on improvement in physical performance, general activity, work or disability,emotional distress, health care utilization and  decreased medication consumption. Will continue to monitor progress towards achieving/maintaining therapeutic goals with special emphasis on  1. Improvement in perceived interfernce  of pain with ADL's. Ability to do home exercises independently. Ability to do household chores indoor and/or outdoor work and social and leisure activities. Improve psychosocial and physical functioning. - she is showing progression towards this treatment goal with the current regimen. She was advised against drinking alcohol with the narcotic pain medicines, advised against driving or handling machinery while adjusting the dose of medicines or if having cognitive  issues related to the current medications. Risk of overdose and death, if medicines not taken as prescribed, were also discussed. If the patient develops new symptoms or if the symptoms worsen, the patient should call the office. While transcribing every attempt was made to maintain the accuracy of the note in terms of it's contents,there may have been some errors made inadvertently. Thank you for allowing me to participate in the care of this patient. Desi Barfield.  Héctor SARMIENTO-DARION     Cc: Az Jones MD

## 2021-02-25 NOTE — PATIENT INSTRUCTIONS
Patient Education        Back Stretches: Exercises  Introduction  Here are some examples of exercises for stretching your back. Start each exercise slowly. Ease off the exercise if you start to have pain. Your doctor or physical therapist will tell you when you can start these exercises and which ones will work best for you. How to do the exercises  Overhead stretch   1. Stand comfortably with your feet shoulder-width apart. 2. Looking straight ahead, raise both arms over your head and reach toward the ceiling. Do not allow your head to tilt back. 3. Hold for 15 to 30 seconds, then lower your arms to your sides. 4. Repeat 2 to 4 times. Side stretch   1. Stand comfortably with your feet shoulder-width apart. 2. Raise one arm over your head, and then lean to the other side. 3. Slide your hand down your leg as you let the weight of your arm gently stretch your side muscles. Hold for 15 to 30 seconds. 4. Repeat 2 to 4 times on each side. Press-up   1. Lie on your stomach, supporting your body with your forearms. 2. Press your elbows down into the floor to raise your upper back. As you do this, relax your stomach muscles and allow your back to arch without using your back muscles. As your press up, do not let your hips or pelvis come off the floor. 3. Hold for 15 to 30 seconds, then relax. 4. Repeat 2 to 4 times. Relax and rest   1. Lie on your back with a rolled towel under your neck and a pillow under your knees. Extend your arms comfortably to your sides. 2. Relax and breathe normally. 3. Remain in this position for about 10 minutes. 4. If you can, do this 2 or 3 times each day. Follow-up care is a key part of your treatment and safety. Be sure to make and go to all appointments, and call your doctor if you are having problems. It's also a good idea to know your test results and keep a list of the medicines you take. Where can you learn more? Go to https://chpepiceweb.Typekit. org and sign in to your Geel[employer] account. Enter Y602 in the [employer] box to learn more about \"Back Stretches: Exercises. \"     If you do not have an account, please click on the \"Sign Up Now\" link. Current as of: March 2, 2020               Content Version: 12.6  © 3845-8866 [employer]. Care instructions adapted under license by [facility] ([facility]). If you have questions about a medical condition or this instruction, always ask your healthcare professional. [place] [address] any warranty or liability for your use of this information. Patient Education        Shoulder Arthritis: Exercises  Introduction  Here are some examples of exercises for you to try. The exercises may be suggested for a condition or for rehabilitation. Start each exercise slowly. Ease off the exercises if you start to have pain. You will be told when to start these exercises and which ones will work best for you. How to do the exercises  Shoulder flexion (lying down)   To make a wand for this exercise, use a piece of PVC pipe or a broom handle with the broom removed. Make the wand about a foot wider than your shoulders. 1. Lie on your back, holding a wand with both hands. Your palms should face down as you hold the wand. 2. Keeping your elbows straight, slowly raise your arms over your head. Raise them until you feel a stretch in your shoulders, upper back, and chest.  3. Hold for 15 to 30 seconds. 4. Repeat 2 to 4 times. Shoulder rotation (lying down)   To make a wand for this exercise, use a piece of PVC pipe or a broom handle with the broom removed. Make the wand about a foot wider than your shoulders. 1. Lie on your back. Hold a wand with both hands with your elbows bent and palms up. 2. Keep your elbows close to your body, and move the wand across your body toward the sore arm. 3. Hold for 8 to 12 seconds. 4. Repeat 2 to 4 times. Shoulder internal rotation with towel   1. Hold a towel above and behind your head with the arm that is not sore. 2. With your sore arm, reach behind your back and grasp the towel. 3. With the arm above your head, pull the towel upward. Do this until you feel a stretch on the front and outside of your sore shoulder. 4. Hold 15 to 30 seconds. 5. Repeat 2 to 4 times. Shoulder blade squeeze   1. Stand with your arms at your sides, and squeeze your shoulder blades together. Do not raise your shoulders up as you squeeze. 2. Hold 6 seconds. 3. Repeat 8 to 12 times. Resisted rows   For this exercise, you will need elastic exercise material, such as surgical tubing or Thera-Band. 1. Put the band around a solid object at about waist level. (A bedpost will work well.) Each hand should hold an end of the band. 2. With your elbows at your sides and bent to 90 degrees, pull the band back. Your shoulder blades should move toward each other. Return to the starting position. 3. Repeat 8 to 12 times. External rotator strengthening exercise   1. Start by tying a piece of elastic exercise material to a doorknob. You can use surgical tubing or Thera-Band. (You may also hold one end of the band in each hand.)  2. Stand or sit with your shoulder relaxed and your elbow bent 90 degrees. Your upper arm should rest comfortably against your side. Squeeze a rolled towel between your elbow and your body for comfort. This will help keep your arm at your side. 3. Hold one end of the elastic band with the hand of the painful arm. 4. Start with your forearm across your belly. Slowly rotate the forearm out away from your body. Keep your elbow and upper arm tucked against the towel roll or the side of your body until you begin to feel tightness in your shoulder. Slowly move your arm back to where you started. 5. Repeat 8 to 12 times.     Internal rotator strengthening exercise 1. Start by tying a piece of elastic exercise material to a doorknob. You can use surgical tubing or Thera-Band. 2. Stand or sit with your shoulder relaxed and your elbow bent 90 degrees. Your upper arm should rest comfortably against your side. Squeeze a rolled towel between your elbow and your body for comfort. This will help keep your arm at your side. 3. Hold one end of the elastic band in the hand of the painful arm. 4. Slowly rotate your forearm toward your body until it touches your belly. Slowly move it back to where you started. 5. Keep your elbow and upper arm firmly tucked against the towel roll or at your side. 6. Repeat 8 to 12 times. Pendulum swing   If you have pain in your back, do not do this exercise. 1. Hold on to a table or the back of a chair with your good arm. Then bend forward a little and let your sore arm hang straight down. This exercise does not use the arm muscles. Rather, use your legs and your hips to create movement that makes your arm swing freely. 2. Use the movement from your hips and legs to guide the slightly swinging arm back and forth like a pendulum (or elephant trunk). Then guide it in circles that start small (about the size of a dinner plate). Make the circles a bit larger each day, as your pain allows. 3. Do this exercise for 5 minutes, 5 to 7 times each day. 4. As you have less pain, try bending over a little farther to do this exercise. This will increase the amount of movement at your shoulder. Follow-up care is a key part of your treatment and safety. Be sure to make and go to all appointments, and call your doctor if you are having problems. It's also a good idea to know your test results and keep a list of the medicines you take. Where can you learn more? Go to https://[employer] Techn[employer]herrieeb.[employer]. org and sign in to your revoPT account. Enter H522 in the [employer] box to learn more about \"Shoulder Arthritis: Exercises. \" If you do not have an account, please click on the \"Sign Up Now\" link. Current as of: March 2, 2020               Content Version: 12.6  © 2006-2020 [employer]. Care instructions adapted under license by [facility] ([facility]). If you have questions about a medical condition or this instruction, always ask your healthcare professional. [address] any warranty or liability for your use of this information.

## 2021-03-03 NOTE — TELEPHONE ENCOUNTER
I called pt to let her know that.  I don't know how she will be able to get her covid vaccine if she is bed bond I advise pt to call the health department to see how she will be able to get it on one of the days she has dialysis

## 2021-03-03 NOTE — TELEPHONE ENCOUNTER
Patient is bed bond and she receives dialsys Monday, Wed and Friday. Patient would like to receive the Covid vaccine and wants to know how she can do that.

## 2021-03-04 NOTE — TELEPHONE ENCOUNTER
I called pt to see if she f/u with gyn pt states she did.  Pt will call and make appt with her ob gyn

## 2021-03-04 NOTE — TELEPHONE ENCOUNTER
Cece Julian nurse for [employer] is with patient Ms. Awad and states that patient has a vaginal yeast infection again and she is requesting medication be called in for patient please advise.

## 2021-03-04 NOTE — TELEPHONE ENCOUNTER
PT HAS HAD C/O PERSISTENT INFECTION DESPITE USING DIFLUCAN IN THE PAST  HAD ASKED PT TO FOLLOW WITH [facility] - WAS PT SEEN BY [facility] - PLEASE CLARIFY

## 2021-03-04 NOTE — TELEPHONE ENCOUNTER
Alissa Bryan nurse for [facility] is with patient Ms. Awad and states that patient has a vaginal yeast infection again and she is requesting medication be called in for patient please advise.

## 2021-03-09 NOTE — TELEPHONE ENCOUNTER
----- Message from Sharmila Balderrama sent at 3/9/2021  8:21 AM EST -----  Subject: Message to Provider    QUESTIONS  Information for Provider? Patient is requesting an in home nurse to help   her out of bed and into her wheelchair. She mentioned [employer]. Please follow up   ---------------------------------------------------------------------------  --------------  CALL BACK INFO  What is the best way for the office to contact you? OK to leave message on   voicemail  Preferred Call Back Phone Number? [phone]  ---------------------------------------------------------------------------  --------------  SCRIPT ANSWERS  Relationship to Patient?  Self

## 2021-03-09 NOTE — TELEPHONE ENCOUNTER
Patient is requesting an in home nurse to help   her out of bed and into her wheelchair. She mentioned [employer].    Please follow up

## 2021-03-11 NOTE — TELEPHONE ENCOUNTER
Called mom to reschedule Andrea's cardiology appointment with Dr. Jeong due to no echo availability on 3/2/20.  Left message informing mom that Andrea's appointment will be rescheduled to 3/320 with Dr. Mccarty in order to schedule all necessary testing.     I called pt and le there know that she has home health already pt states she do and she like her and didn't know were message came from.  I also spoke to ms shanice and she will be making an appt for fmla paper work when she gets it

## 2021-03-15 NOTE — TELEPHONE ENCOUNTER
Reason for Disposition   Patient wants to be seen    Answer Assessment - Initial Assessment Questions  1. APPEARANCE of LESION: \"What does it look like? \"       Open - no exudate     2. SIZE: \"How big is it? \" (e.g., compare to size of pinhead, tip of pen, eraser, coin, pea, grape, ping pong ball; or size in cms or inches)       Size of pinkly finger nail    3. COLOR: \"What color is it? \" \"Is there more than one color? \"      Maya Dsouza     4. SHAPE: \"What shape is it? \" (e.g., round, irregular)      Oval now (used to be round)    5. RAISED: \"Does it stick up above the skin or is it flat? \" (e.g., raised or elevated)      Raised off her face    6. TENDER: \"Does it hurt when you touch it? \"  (Scale 1-10; or mild, moderate, severe)      Tender to the touch; like a mole sitting on top of a hard lump    7. LOCATION: \"Where is it located? \"       Under the left eye    8. ONSET: \"When did it first appear? \"       Onset was about three weeks ago    9. NUMBER: \"Is there just one? \" or \"Are there others? \"      No others that are swollen but she has lots of moles    10. CAUSE: \"What do you think it is? \"        Unknown    11. OTHER SYMPTOMS: \"Do you have any other symptoms? \" (e.g., fever)        No    12. PREGNANCY: \"Is there any chance you are pregnant? \" \"When was your last menstrual period? \"        No    Protocols used: SKIN LESION - MOLES OR GROWTHS-ADULT-[place]    Call came from the [address] [place] [address]    See triage above - Patient denies fever. Transfer to [facility] at the [facility] center for scheduling. Care advice provided.

## 2021-03-25 NOTE — PROGRESS NOTES
TELE HEALTH VISIT (AUDIO-VISUAL)    Pursuant to the emergency declaration under the [facility], [facility] waiver authority and the [employer] Act, this Virtual  Visit was conducted, with patient's consent, to reduce the patient's risk of exposure to COVID-19 and provide continuity of care for an established patient. Service is  provided through a video synchronous discussion virtually to substitute for in-person clinic visit. Due to this being a TeleHealth encounter (During [facility] public health emergency), evaluation of the following organ systems was limited: Vitals/Constitutional/EENT/Resp/CV/GI//MS/Neuro/Skin/Zzqr-Upam-Lwh. Anmol [facility]  1945  [id number]    Ms. Nevin Wiggins is being seen virtually for a follow up visit using [employer] Video visit/Alereo or face time  Informed verbal consent to the virtual visit was obtained from Ms. Nevin Wiggins. Risks associated with HIPPA compliance with the virtual visit was explained to the patient. Ms. Nevin Wiggins is at her home and Anselmo RAMOS is in her office. HISTORY OF PRESENT ILLNESS:  Ms. Nevin Wiggins is a 64 y.o. female  being assessed for a follow up visit for pain management for evaluation of ongoing care regarding her symptoms and monitoring of compliance with long term use high risk medications. She has a diagnosis of   1. Chronic pain syndrome    2. Bilateral shoulder region arthritis    3. DDD (degenerative disc disease), lumbosacral    4. Spinal stenosis of lumbar region, unspecified whether neurogenic claudication present    5. Spondylolisthesis at L4-L5 level    6. Facet arthropathy, lumbosacral    7. Localized osteoarthritis of knees, bilateral    8. Osteoarthritis of right knee, unspecified osteoarthritis type    9. Arthritis of both feet    10. Sprain of left ankle, unspecified ligament, initial encounter    11.  Morbid obesity ([address] 12. Primary insomnia      On the Patients Pain Assessment form reviewed with the Medical Assistant:  She complains of pain in the right shoulder(s): entire area  And in the left knee . She rates the pain 8/10 and describes it as aching. Current treatment regimen has helped relieve about 10% of the pain. She denies any side effects from the current pain regimen. Patient reports that since the last follow up visit the physical functioning is unchanged, family/social relationships are unchanged, mood is unchanged sleep patterns are unchanged, and that the overall functioning is unchanged. Patient denies misusing/abusing her narcotic pain medications or using any illegal drugs. Upon obtaining medical history from Ms. Rmua Fortune states that pain is manageable on current pain therapy. Endorses pain mainly to the shoulders, has not been seen by orthopedics since the pandemic started. Does not believe the Cymbalta helps with the pain, Neurontin helps with nerve pain in the back. Mood is stable without anxiety. Sleep is fair with an average of 5-6 hours. Denies to having issues of constipation. Tolerating activities/house chores with moderate tenderness to the lower back. ALLERGIES: Patients list of allergies were reviewed     MEDICATIONS: Ms. Ruma Fortune list of medications were reviewed. Her current medications are   Outpatient Medications Prior to Visit   Medication Sig Dispense Refill    hydrOXYzine (ATARAX) 25 MG tablet TAKE 1 TABLET BY MOUTH EVERY 12 HOURS AS NEEDED FOR ITCHING 20 tablet 0    pravastatin (PRAVACHOL) 40 MG tablet TAKE 1 TABLET BY MOUTH DAILY 30 tablet 1    traZODone (DESYREL) 100 MG tablet TAKE 1 TABLET BY MOUTH EVERY NIGHT AS NEEDED FOR SLEEP 30 tablet 0    silver sulfADIAZINE (SSD) 1 % cream APPLY EXTERNALLY TO THE AFFECTED AREA DAILY 400 g 0    loratadine (CLARITIN) 10 MG tablet TAKE 1 TABLET BY MOUTH DAILY AS NEEDED 30 tablet 1    DULoxetine (CYMBALTA) 30 MG extended release capsule Take 1 capsule by mouth daily 30 capsule 1    brompheniramine-pseudoephedrine-DM (BROMFED DM) 2-30-10 MG/5ML syrup Take 5 mLs by mouth 4 times daily as needed for Cough 240 mL 0    benzonatate (TESSALON) 200 MG capsule TAKE 1 CAPSULE BY MOUTH TWICE DAILY AS NEEDED FOR COUGH 30 capsule 0    midodrine (PROAMATINE) 5 MG tablet Take 1 tablet by mouth 3 times daily 90 tablet 2    dermacerin (EUCERIN) CREA cream SMALL AMOUNT BID / PRN 1 Tube 0    vitamin D (ERGOCALCIFEROL) 1.25 MG (94817 UT) CAPS capsule Take 50,000 Units by mouth once a week      fluticasone (FLONASE) 50 MCG/ACT nasal spray 2 sprays by Nasal route daily as needed for Rhinitis 1 Bottle 1    Incontinence Supply Disposable (DEPEND UNDERWEAR LARGE/XL) MISC 1 Device by Does not apply route 4 times daily as needed (PRN) 120 each 2    Misc.  Devices (GEL-FOAM CUSHION) MISC 1 Device by Does not apply route daily 1 each 0    Blood Pressure Monitor RACHELL 1 capsule by Does not apply route daily 1 Device 0    amiodarone (CORDARONE) 200 MG tablet Take 200 mg by mouth 2 times daily     [address] by Does not apply route 1 each 0    Blood Pressure Monitor RACHELL 1 Device by Does not apply route daily 1 Device 0    aspirin (ASPIRIN LOW DOSE) 81 MG EC tablet Take 1 tablet by mouth daily 30 tablet 5    albuterol sulfate HFA (PROAIR HFA) 108 (90 Base) MCG/ACT inhaler Inhale 2 puffs into the lungs every 6 hours as needed for Shortness of Breath 1 Inhaler 0    Scooter MISC by Does not apply route 1 each 0    clopidogrel (PLAVIX) 75 MG tablet Take 1 tablet by mouth daily 30 tablet 3    Blood Pressure Monitor MISC 1 Device by Does not apply route as needed (prn) 1 each 0    Blood Pressure Monitoring (5 SERIES BP MONITOR) RACHELL 1 Device by Does not apply route as needed (PRN) 1 Device 0    calcium acetate (PHOSLO) 667 MG capsule Take by mouth 3 times daily (with meals)      lidocaine (LIDODERM) 5 % Place 1 patch onto the skin daily 12 hours on, 12 hours off. 30 patch 1    gabapentin (NEURONTIN) 400 MG capsule Take 1 capsule by mouth nightly for 30 days. 30 capsule 0    Diclofenac Sodium POWD #5D Formula Diclo 3%, Lido 2%, Prilo 2% Pharm to comp: Apply to the shoulder twice a day 1 Bottle 1     No facility-administered medications prior to visit. SOCIAL/FAMILY/PAST MEDICAL HISTORY: Ms. Sherwin Kawasaki, family and past medical history was reviewed. REVIEW OF SYSTEMS:    Respiratory: Negative for apnea, chest tightness and shortness of breath or change in baseline breathing. Gastrointestinal: Negative for nausea, vomiting, abdominal pain, diarrhea, constipation, blood in stool and abdominal distention. PHYSICAL EXAM:   Nursing note and vitals reviewed. LMP 01/21/1964  as per patient  Constitutional: She appears well-developed and well-nourished. No acute distress. Skin: Skin appears to be warm and dry. No rashes or any other marks noted. She is not diaphoretic. Neurological/Psychiatric:She is alert and oriented to person, place, and time. Coordination is  normal.  Her mood isAppropriate and affect is Neutral/Euthymic(normal). Her behavior is normal.   thought content normal.   Musculoskeletal / Extremities: Not assessed    IMPRESSION:   1. Chronic pain syndrome    2. Bilateral shoulder region arthritis    3. DDD (degenerative disc disease), lumbosacral    4. Spinal stenosis of lumbar region, unspecified whether neurogenic claudication present    5. Spondylolisthesis at L4-L5 level    6. Facet arthropathy, lumbosacral    7. Localized osteoarthritis of knees, bilateral    8. Osteoarthritis of right knee, unspecified osteoarthritis type    9. Arthritis of both feet    10. Sprain of left ankle, unspecified ligament, initial encounter    11. Morbid obesity ([address]    12.  Primary insomnia        PLAN:  Informed verbal consent regarding treatment was obtained  -Continue with Baclofen, Cymbalta, Diclofenac, Neurontin  -Tramadol 50 mg daily prn -Completed home PT, shoulder stretches recommended  -Recommended patient f/u with orthopedics for injections in the shoulders, last received cortisone injections 2years ago. Education provided on Cymbalta, Tramadol, side effects discussed, patient and daughter verbalized understanding  -CBT techniques- relaxation therapies such as biofeedback, mindfulness based stress reduction, imagery, cognitive restructuring, problem solving discussed with patient  -Last UDS 8/6/20 Consistent  -Return in about 4 weeks (around 4/22/2021). Current Outpatient Medications   Medication Sig Dispense Refill    gabapentin (NEURONTIN) 400 MG capsule Take 1 capsule by mouth nightly for 30 days. 30 capsule 0    lidocaine (LIDODERM) 5 % Place 1 patch onto the skin daily 12 hours on, 12 hours off. 30 patch 1    traMADol (ULTRAM) 50 MG tablet Take 1 tablet by mouth daily as needed for Pain (Take 1 tab orally daily for breakthrough pain) for up to 30 days.  30 tablet 0    Diclofenac Sodium POWD #5D Formula Diclo 3%, Lido 2%, Prilo 2% Pharm to comp: Apply to the shoulder twice a day 1 Bottle 1    hydrOXYzine (ATARAX) 25 MG tablet TAKE 1 TABLET BY MOUTH EVERY 12 HOURS AS NEEDED FOR ITCHING 20 tablet 0    pravastatin (PRAVACHOL) 40 MG tablet TAKE 1 TABLET BY MOUTH DAILY 30 tablet 1    traZODone (DESYREL) 100 MG tablet TAKE 1 TABLET BY MOUTH EVERY NIGHT AS NEEDED FOR SLEEP 30 tablet 0    silver sulfADIAZINE (SSD) 1 % cream APPLY EXTERNALLY TO THE AFFECTED AREA DAILY 400 g 0    loratadine (CLARITIN) 10 MG tablet TAKE 1 TABLET BY MOUTH DAILY AS NEEDED 30 tablet 1    DULoxetine (CYMBALTA) 30 MG extended release capsule Take 1 capsule by mouth daily 30 capsule 1    brompheniramine-pseudoephedrine-DM (BROMFED DM) 2-30-10 MG/5ML syrup Take 5 mLs by mouth 4 times daily as needed for Cough 240 mL 0    benzonatate (TESSALON) 200 MG capsule TAKE 1 CAPSULE BY MOUTH TWICE DAILY AS NEEDED FOR COUGH 30 capsule 0    midodrine (PROAMATINE) 5 MG tablet Take 1 tablet by mouth 3 times daily 90 tablet 2    dermacerin (EUCERIN) CREA cream SMALL AMOUNT BID / PRN 1 Tube 0    vitamin D (ERGOCALCIFEROL) 1.25 MG (04137 UT) CAPS capsule Take 50,000 Units by mouth once a week      fluticasone (FLONASE) 50 MCG/ACT nasal spray 2 sprays by Nasal route daily as needed for Rhinitis 1 Bottle 1    Incontinence Supply Disposable (DEPEND UNDERWEAR LARGE/XL) MISC 1 Device by Does not apply route 4 times daily as needed (PRN) 120 each 2    Misc. Devices (GEL-FOAM CUSHION) MISC 1 Device by Does not apply route daily 1 each 0    Blood Pressure Monitor RACHELL 1 capsule by Does not apply route daily 1 Device 0    amiodarone (CORDARONE) 200 MG tablet Take 200 mg by mouth 2 times daily     [address] by Does not apply route 1 each 0    Blood Pressure Monitor RACHELL 1 Device by Does not apply route daily 1 Device 0    aspirin (ASPIRIN LOW DOSE) 81 MG EC tablet Take 1 tablet by mouth daily 30 tablet 5    albuterol sulfate HFA (PROAIR HFA) 108 (90 Base) MCG/ACT inhaler Inhale 2 puffs into the lungs every 6 hours as needed for Shortness of Breath 1 Inhaler 0    Scooter MISC by Does not apply route 1 each 0    clopidogrel (PLAVIX) 75 MG tablet Take 1 tablet by mouth daily 30 tablet 3    Blood Pressure Monitor MISC 1 Device by Does not apply route as needed (prn) 1 each 0    Blood Pressure Monitoring (5 SERIES BP MONITOR) RACHELL 1 Device by Does not apply route as needed (PRN) 1 Device 0    calcium acetate (PHOSLO) 667 MG capsule Take by mouth 3 times daily (with meals)       No current facility-administered medications for this visit. I will continue her current medication regimen  which is part of the above treatment schedule. It has been helping with Ms. Monreal's chronic  medical problems which for this visit include:   Diagnoses of Chronic pain syndrome, Bilateral shoulder region arthritis, DDD (degenerative disc disease), lumbosacral, Spinal stenosis of lumbar region, unspecified whether neurogenic claudication present, Spondylolisthesis at L4-L5 level, Facet arthropathy, lumbosacral, Localized osteoarthritis of knees, bilateral, Osteoarthritis of right knee, unspecified osteoarthritis type, Arthritis of both feet, Sprain of left ankle, unspecified ligament, initial encounter, Morbid obesity ([address] and Primary insomnia were pertinent to this visit. Risks and benefits of the medications and other alternative treatments  including no treatment were discussed with the patient. The common side effects of these medications were also explained to the patient. Informed verbal consent was obtained. Goals of current treatment regimen include improvement in pain, restoration of functioning- with focus on improvement in physical performance, general activity, work or disability,emotional distress, health care utilization and  decreased medication consumption. Will continue to monitor progress towards achieving/maintaining therapeutic goals with special emphasis on  1. Improvement in perceived interfernce  of pain with ADL's. Ability to do home exercises independently. Ability to do household chores indoor and/or outdoor work and social and leisure activities. Improve psychosocial and physical functioning. - she is showing progression towards this treatment goal with the current regimen. She was advised against drinking alcohol with the narcotic pain medicines, advised against driving or handling machinery while adjusting the dose of medicines or if having cognitive  issues related to the current medications. Risk of overdose and death, if medicines not taken as prescribed, were also discussed. If the patient develops new symptoms or if the symptoms worsen, the patient should call the office. While transcribing every attempt was made to maintain the accuracy of the note in terms of it's contents,there may have been some errors made inadvertently.   Thank you for allowing me to participate in the care of this patient. Bran Nguyen APRN-CNP     Cc: Chandrika Parra MD

## 2021-03-25 NOTE — PATIENT INSTRUCTIONS
Patient Education        Shoulder Arthritis: Exercises  Introduction  Here are some examples of exercises for you to try. The exercises may be suggested for a condition or for rehabilitation. Start each exercise slowly. Ease off the exercises if you start to have pain. You will be told when to start these exercises and which ones will work best for you. How to do the exercises  Shoulder flexion (lying down)   To make a wand for this exercise, use a piece of PVC pipe or a broom handle with the broom removed. Make the wand about a foot wider than your shoulders. 1. Lie on your back, holding a wand with both hands. Your palms should face down as you hold the wand. 2. Keeping your elbows straight, slowly raise your arms over your head. Raise them until you feel a stretch in your shoulders, upper back, and chest.  3. Hold for 15 to 30 seconds. 4. Repeat 2 to 4 times. Shoulder rotation (lying down)   To make a wand for this exercise, use a piece of PVC pipe or a broom handle with the broom removed. Make the wand about a foot wider than your shoulders. 1. Lie on your back. Hold a wand with both hands with your elbows bent and palms up. 2. Keep your elbows close to your body, and move the wand across your body toward the sore arm. 3. Hold for 8 to 12 seconds. 4. Repeat 2 to 4 times. Shoulder internal rotation with towel   1. Hold a towel above and behind your head with the arm that is not sore. 2. With your sore arm, reach behind your back and grasp the towel. 3. With the arm above your head, pull the towel upward. Do this until you feel a stretch on the front and outside of your sore shoulder. 4. Hold 15 to 30 seconds. 5. Repeat 2 to 4 times. Shoulder blade squeeze   1. Stand with your arms at your sides, and squeeze your shoulder blades together. Do not raise your shoulders up as you squeeze. 2. Hold 6 seconds. 3. Repeat 8 to 12 times.     Resisted rows   For this exercise, you will need elastic exercise material, such as surgical tubing or Thera-Band. 1. Put the band around a solid object at about waist level. (A bedpost will work well.) Each hand should hold an end of the band. 2. With your elbows at your sides and bent to 90 degrees, pull the band back. Your shoulder blades should move toward each other. Return to the starting position. 3. Repeat 8 to 12 times. External rotator strengthening exercise   1. Start by tying a piece of elastic exercise material to a doorknob. You can use surgical tubing or Thera-Band. (You may also hold one end of the band in each hand.)  2. Stand or sit with your shoulder relaxed and your elbow bent 90 degrees. Your upper arm should rest comfortably against your side. Squeeze a rolled towel between your elbow and your body for comfort. This will help keep your arm at your side. 3. Hold one end of the elastic band with the hand of the painful arm. 4. Start with your forearm across your belly. Slowly rotate the forearm out away from your body. Keep your elbow and upper arm tucked against the towel roll or the side of your body until you begin to feel tightness in your shoulder. Slowly move your arm back to where you started. 5. Repeat 8 to 12 times. Internal rotator strengthening exercise   1. Start by tying a piece of elastic exercise material to a doorknob. You can use surgical tubing or Thera-Band. 2. Stand or sit with your shoulder relaxed and your elbow bent 90 degrees. Your upper arm should rest comfortably against your side. Squeeze a rolled towel between your elbow and your body for comfort. This will help keep your arm at your side. 3. Hold one end of the elastic band in the hand of the painful arm. 4. Slowly rotate your forearm toward your body until it touches your belly. Slowly move it back to where you started. 5. Keep your elbow and upper arm firmly tucked against the towel roll or at your side. 6. Repeat 8 to 12 times.     Pendulum swing   If you have pain in your back, do not do this exercise. 1. Hold on to a table or the back of a chair with your good arm. Then bend forward a little and let your sore arm hang straight down. This exercise does not use the arm muscles. Rather, use your legs and your hips to create movement that makes your arm swing freely. 2. Use the movement from your hips and legs to guide the slightly swinging arm back and forth like a pendulum (or elephant trunk). Then guide it in circles that start small (about the size of a dinner plate). Make the circles a bit larger each day, as your pain allows. 3. Do this exercise for 5 minutes, 5 to 7 times each day. 4. As you have less pain, try bending over a little farther to do this exercise. This will increase the amount of movement at your shoulder. Follow-up care is a key part of your treatment and safety. Be sure to make and go to all appointments, and call your doctor if you are having problems. It's also a good idea to know your test results and keep a list of the medicines you take. Where can you learn more? Go to https://[employer]. org and sign in to your [employer] account. Enter H562 in the Q2ebanking box to learn more about \"Shoulder Arthritis: Exercises. \"     If you do not have an account, please click on the \"Sign Up Now\" link. Current as of: November 16, 2020               Content Version: 12.8  © 3514-4774 [employer]wise, Incorporated. Care instructions adapted under license by [facility] ([facility]). If you have questions about a medical condition or this instruction, always ask your healthcare professional. [place] [address] any warranty or liability for your use of this information.

## 2021-03-26 NOTE — TELEPHONE ENCOUNTER
Patient called stating she did not receive her Duloxetine & another pain pill (she doesn't know the name).     Frank / Janae Jordan

## 2021-03-26 NOTE — TELEPHONE ENCOUNTER
----- Message from Ruth Walters sent at 3/25/2021 11:23 AM EDT -----  Subject: Appointment Request    Reason for Call: Routine (Patient Request) No Script    QUESTIONS  Type of Appointment? Established Patient  Reason for appointment request? No appointments available during search  Additional Information for Provider? pt needs [place] paperwork filled out   and all appts with  are virtual. please follow up with pt. to get   paperwork completed. ---------------------------------------------------------------------------  --------------  Cheron Messing INFO  What is the best way for the office to contact you? OK to leave message on   voicemail  Preferred Call Back Phone Number? [phone]  ---------------------------------------------------------------------------  --------------  SCRIPT ANSWERS  Relationship to Patient? Self  Appointment reason? Symptomatic  Select script based on patient symptoms? Adult No Script   (Is the patient requesting to see the provider for a procedure?)? No  (Is the patient requesting to see the provider urgently  today or   tomorrow. )? No  Have you been diagnosed with   tested for   or told that you are suspected of having COVID-19 (Coronavirus)? No  Have you had a fever or taken medication to treat a fever within the past   3 days? No  Have you had a cough   shortness of breath or flu-like symptoms within the past 3 days? No  Do you currently have flu-like symptoms including fever or chills   cough   shortness of breath   or difficulty breathing   or new loss of taste or smell? No  (Service Expert  click yes below to proceed with Kliq[employer] As Usual   Scheduling)?  Yes

## 2021-03-29 NOTE — TELEPHONE ENCOUNTER
Odilia / Hay Muro called about the Diclofenac Sodium POWD. She said they do not compound. Please send script to another pharmacy that does.

## 2021-04-21 NOTE — TELEPHONE ENCOUNTER
----- Message from Ninoska Kumar sent at 4/20/2021  8:36 AM EDT -----  Subject: Message to Provider    QUESTIONS  Information for Provider? Daughter of pt Dena Mcmillan) called stating that she   would like to inform Dr. Iwona Spencer that the pt is currently admitted into   [facility], so she will not be able to attend her appt today.  ---------------------------------------------------------------------------  --------------  [address]  What is the best way for the office to contact you? OK to leave message on   voicemail  Preferred Call Back Phone Number? [phone]  ---------------------------------------------------------------------------  --------------  SCRIPT ANSWERS  Relationship to Patient? Other  Representative Name? Lizz Sears   Is the [facility] on the appropriate HIPAA document in Epic?  Yes

## 2021-04-22 NOTE — PROGRESS NOTES
TELE HEALTH VISIT (AUDIO-VISUAL)    Pursuant to the emergency declaration under the [facility] waiver authority and the [employer] Act, this Virtual  Visit was conducted, with patient's consent, to reduce the patient's risk of exposure to COVID-19 and provide continuity of care for an established patient. Service is  provided through a video synchronous discussion virtually to substitute for in-person clinic visit. Due to this being a TeleHealth encounter (During [facility]- public health emergency), evaluation of the following organ systems was limited: Vitals/Constitutional/EENT/Resp/CV/GI//MS/Neuro/Skin/Aiwt-Kwcv-Xqx. Nabeel Barefoot  1945  [id number]    Ms. Sandra Pierre is being seen virtually for a follow up visit using [employer] Video visit/[employer] or face time  Informed verbal consent to the virtual visit was obtained from Ms. Sandra Pierre. Risks associated with HIPPA compliance with the virtual visit was explained to the patient. Ms. Sandra Pierre is at the hospital and Arina RAMOS is in her office. HISTORY OF PRESENT ILLNESS:  Ms. Sandra Pierre is a 64 y.o. female  being assessed for a follow up visit for pain management for evaluation of ongoing care regarding her symptoms and monitoring of compliance with long term use high risk medications. She has a diagnosis of   1. Chronic pain syndrome    2. Bilateral shoulder region arthritis    3. DDD (degenerative disc disease), lumbosacral    4. Spinal stenosis of lumbar region, unspecified whether neurogenic claudication present    5. Spondylolisthesis at L4-L5 level    6. Facet arthropathy, lumbosacral    7. Localized osteoarthritis of knees, bilateral    8. Osteoarthritis of right knee, unspecified osteoarthritis type    9. Arthritis of both feet    10.  Sprain of left ankle, unspecified ligament, initial encounter      On the Patients Pain Assessment form reviewed with the Medical Assistant:  She complains of pain in the shoulders, lower back. She rates the pain 5/10 and describes it as aching. Current treatment regimen has helped relieve about 50% of the pain. She denies any side effects from the current pain regimen. Patient reports that since the last follow up visit the physical functioning is unchanged, family/social relationships are unchanged, mood is unchanged sleep patterns are unchanged, and that the overall functioning is unchanged. Patient denies misusing/abusing her narcotic pain medications or using any illegal drugs. Upon obtaining medical history from Ms. Christa Stewart states that pain is manageable on current pain therapy. Currently in the hospital for hepatic encephalopathy. Scheduled to start hospice care/end of life care secondary to recent diagnosis of colon/cecal cancer. She was taken off the muscle relaxer's/Trazodone, with adjustments to the Neurontin. Mood is low. Sleep is fair with an average of 5-6 hours. Denies to having issues of constipation. Tolerating activities/house chores with moderate tenderness to the lower back/shoulders. ALLERGIES: Patients list of allergies were reviewed     MEDICATIONS: Ms. Sandra Pierre list of medications were reviewed. Her current medications are   Outpatient Medications Prior to Visit   Medication Sig Dispense Refill    midodrine (PROAMATINE) 5 MG tablet TAKE 1 TABLET BY MOUTH THREE TIMES DAILY 90 tablet 2    Diclofenac Sodium POWD #5D Formula Diclo 3%, Lido 2%, Prilo 2% Pharm to comp: Apply to the shoulder twice a day 1 Bottle 1    DULoxetine (CYMBALTA) 30 MG extended release capsule Take 1 capsule by mouth daily 30 capsule 1    baclofen (LIORESAL) 10 MG tablet Take 1 tablet by mouth 2 times daily 60 tablet 0    gabapentin (NEURONTIN) 400 MG capsule Take 1 capsule by mouth nightly for 30 days.  30 capsule 0    lidocaine (LIDODERM) 5 % Place 1 patch onto the skin daily 12 hours on, 12 hours off. 30 patch 1    traMADol (ULTRAM) 50 MG tablet Take 1 tablet by mouth daily as needed for Pain (Take 1 tab orally daily for breakthrough pain) for up to 30 days. 30 tablet 0    hydrOXYzine (ATARAX) 25 MG tablet TAKE 1 TABLET BY MOUTH EVERY 12 HOURS AS NEEDED FOR ITCHING 20 tablet 0    pravastatin (PRAVACHOL) 40 MG tablet TAKE 1 TABLET BY MOUTH DAILY 30 tablet 1    traZODone (DESYREL) 100 MG tablet TAKE 1 TABLET BY MOUTH EVERY NIGHT AS NEEDED FOR SLEEP 30 tablet 0    silver sulfADIAZINE (SSD) 1 % cream APPLY EXTERNALLY TO THE AFFECTED AREA DAILY 400 g 0    loratadine (CLARITIN) 10 MG tablet TAKE 1 TABLET BY MOUTH DAILY AS NEEDED 30 tablet 1    brompheniramine-pseudoephedrine-DM (BROMFED DM) 2-30-10 MG/5ML syrup Take 5 mLs by mouth 4 times daily as needed for Cough 240 mL 0    benzonatate (TESSALON) 200 MG capsule TAKE 1 CAPSULE BY MOUTH TWICE DAILY AS NEEDED FOR COUGH 30 capsule 0    dermacerin (EUCERIN) CREA cream SMALL AMOUNT BID / PRN 1 Tube 0    vitamin D (ERGOCALCIFEROL) 1.25 MG (75120 UT) CAPS capsule Take 50,000 Units by mouth once a week      fluticasone (FLONASE) 50 MCG/ACT nasal spray 2 sprays by Nasal route daily as needed for Rhinitis 1 Bottle 1    Incontinence Supply Disposable (DEPEND UNDERWEAR LARGE/XL) MISC 1 Device by Does not apply route 4 times daily as needed (PRN) 120 each 2    Misc.  Devices (GEL-FOAM CUSHION) MISC 1 Device by Does not apply route daily 1 each 0    Blood Pressure Monitor RACHELL 1 capsule by Does not apply route daily 1 Device 0    amiodarone (CORDARONE) 200 MG tablet Take 200 mg by mouth 2 times daily     [address] by Does not apply route 1 each 0    Blood Pressure Monitor RACHELL 1 Device by Does not apply route daily 1 Device 0    aspirin (ASPIRIN LOW DOSE) 81 MG EC tablet Take 1 tablet by mouth daily 30 tablet 5    albuterol sulfate HFA (PROAIR HFA) 108 (90 Base) MCG/ACT inhaler Inhale 2 puffs into the lungs every 6 hours as needed for Shortness of Breath 1 Inhaler 0    Scooter MISC by Does not apply route 1 each 0    clopidogrel (PLAVIX) 75 MG tablet Take 1 tablet by mouth daily 30 tablet 3    Blood Pressure Monitor MISC 1 Device by Does not apply route as needed (prn) 1 each 0    Blood Pressure Monitoring (5 SERIES BP MONITOR) RACHELL 1 Device by Does not apply route as needed (PRN) 1 Device 0    calcium acetate (PHOSLO) 667 MG capsule Take by mouth 3 times daily (with meals)       No facility-administered medications prior to visit. SOCIAL/FAMILY/PAST MEDICAL HISTORY: Ms. Kim Cardoso, family and past medical history was reviewed. REVIEW OF SYSTEMS:    Respiratory: Negative for apnea, chest tightness and shortness of breath or change in baseline breathing. Gastrointestinal: Negative for nausea, vomiting, abdominal pain, diarrhea, constipation, blood in stool and abdominal distention. PHYSICAL EXAM:   Nursing note and vitals reviewed. LMP 01[birth year]21/1966  as per patient  Constitutional: She appears well-developed and well-nourished. No acute distress. Skin: Skin appears to be warm and dry. No rashes or any other marks noted. She is not diaphoretic. Neurological/Psychiatric:She is alert and oriented to person, place, and time. Coordination is  normal.  Her mood isAppropriate and affect is Neutral/Euthymic(normal). Her behavior is normal.   thought content normal.   Musculoskeletal / Extremities: Not assessed    IMPRESSION:   1. Chronic pain syndrome    2. Bilateral shoulder region arthritis    3. DDD (degenerative disc disease), lumbosacral    4. Spinal stenosis of lumbar region, unspecified whether neurogenic claudication present    5. Spondylolisthesis at L4-L5 level    6. Facet arthropathy, lumbosacral    7. Localized osteoarthritis of knees, bilateral    8. Osteoarthritis of right knee, unspecified osteoarthritis type    9. Arthritis of both feet    10.  Sprain of left ankle, unspecified ligament, initial encounter        PLAN: Informed verbal consent regarding treatment was obtained  -No medications were prescribed for the patient today pending discharge from the hospital  -Will review discharge treatment after patient starts hospice care  -Continues to receive home health care  -Counseled patient on positive copying skills related to cancer diagnosis  -CBT techniques- relaxation therapies such as biofeedback, mindfulness based stress reduction, imagery, cognitive restructuring, problem solving discussed with patient  -Last UDS 8/11/20 Consistent  -Return in about 4 weeks (around 5/20/2021). Current Outpatient Medications   Medication Sig Dispense Refill    midodrine (PROAMATINE) 5 MG tablet TAKE 1 TABLET BY MOUTH THREE TIMES DAILY 90 tablet 2    Diclofenac Sodium POWD #5D Formula Diclo 3%, Lido 2%, Prilo 2% Pharm to comp: Apply to the shoulder twice a day 1 Bottle 1    DULoxetine (CYMBALTA) 30 MG extended release capsule Take 1 capsule by mouth daily 30 capsule 1    baclofen (LIORESAL) 10 MG tablet Take 1 tablet by mouth 2 times daily 60 tablet 0    gabapentin (NEURONTIN) 400 MG capsule Take 1 capsule by mouth nightly for 30 days. 30 capsule 0    lidocaine (LIDODERM) 5 % Place 1 patch onto the skin daily 12 hours on, 12 hours off. 30 patch 1    traMADol (ULTRAM) 50 MG tablet Take 1 tablet by mouth daily as needed for Pain (Take 1 tab orally daily for breakthrough pain) for up to 30 days.  30 tablet 0    hydrOXYzine (ATARAX) 25 MG tablet TAKE 1 TABLET BY MOUTH EVERY 12 HOURS AS NEEDED FOR ITCHING 20 tablet 0    pravastatin (PRAVACHOL) 40 MG tablet TAKE 1 TABLET BY MOUTH DAILY 30 tablet 1    traZODone (DESYREL) 100 MG tablet TAKE 1 TABLET BY MOUTH EVERY NIGHT AS NEEDED FOR SLEEP 30 tablet 0    silver sulfADIAZINE (SSD) 1 % cream APPLY EXTERNALLY TO THE AFFECTED AREA DAILY 400 g 0    loratadine (CLARITIN) 10 MG tablet TAKE 1 TABLET BY MOUTH DAILY AS NEEDED 30 tablet 1    brompheniramine-pseudoephedrine-DM (BROMFED DM) 2-30-10 MG/5ML syrup Take 5 mLs by mouth 4 times daily as needed for Cough 240 mL 0    benzonatate (TESSALON) 200 MG capsule TAKE 1 CAPSULE BY MOUTH TWICE DAILY AS NEEDED FOR COUGH 30 capsule 0    dermacerin (EUCERIN) CREA cream SMALL AMOUNT BID / PRN 1 Tube 0    vitamin D (ERGOCALCIFEROL) 1.25 MG (86131 UT) CAPS capsule Take 50,000 Units by mouth once a week      fluticasone (FLONASE) 50 MCG/ACT nasal spray 2 sprays by Nasal route daily as needed for Rhinitis 1 Bottle 1    Incontinence Supply Disposable (DEPEND UNDERWEAR LARGE/XL) MISC 1 Device by Does not apply route 4 times daily as needed (PRN) 120 each 2    Misc. Devices (GEL-FOAM CUSHION) MISC 1 Device by Does not apply route daily 1 each 0    Blood Pressure Monitor RACHELL 1 capsule by Does not apply route daily 1 Device 0    amiodarone (CORDARONE) 200 MG tablet Take 200 mg by mouth 2 times daily     [address] by Does not apply route 1 each 0    Blood Pressure Monitor RACHELL 1 Device by Does not apply route daily 1 Device 0    aspirin (ASPIRIN LOW DOSE) 81 MG EC tablet Take 1 tablet by mouth daily 30 tablet 5    albuterol sulfate HFA (PROAIR HFA) 108 (90 Base) MCG/ACT inhaler Inhale 2 puffs into the lungs every 6 hours as needed for Shortness of Breath 1 Inhaler 0    Scooter MISC by Does not apply route 1 each 0    clopidogrel (PLAVIX) 75 MG tablet Take 1 tablet by mouth daily 30 tablet 3    Blood Pressure Monitor MISC 1 Device by Does not apply route as needed (prn) 1 each 0    Blood Pressure Monitoring (5 SERIES BP MONITOR) RACHELL 1 Device by Does not apply route as needed (PRN) 1 Device 0    calcium acetate (PHOSLO) 667 MG capsule Take by mouth 3 times daily (with meals)       No current facility-administered medications for this visit. I will continue her current medication regimen  which is part of the above treatment schedule. It has been helping with Ms. Monreal's chronic  medical problems which for this visit include: Diagnoses of Chronic pain syndrome, Bilateral shoulder region arthritis, DDD (degenerative disc disease), lumbosacral, Spinal stenosis of lumbar region, unspecified whether neurogenic claudication present, Spondylolisthesis at L4-L5 level, Facet arthropathy, lumbosacral, Localized osteoarthritis of knees, bilateral, Osteoarthritis of right knee, unspecified osteoarthritis type, Arthritis of both feet, and Sprain of left ankle, unspecified ligament, initial encounter were pertinent to this visit. Risks and benefits of the medications and other alternative treatments  including no treatment were discussed with the patient. The common side effects of these medications were also explained to the patient. Informed verbal consent was obtained. Goals of current treatment regimen include improvement in pain, restoration of functioning- with focus on improvement in physical performance, general activity, work or disability,emotional distress, health care utilization and  decreased medication consumption. Will continue to monitor progress towards achieving/maintaining therapeutic goals with special emphasis on  1. Improvement in perceived interfernce  of pain with ADL's. Ability to do home exercises independently. Ability to do household chores indoor and/or outdoor work and social and leisure activities. Improve psychosocial and physical functioning. - she is not showing any significant progress/or showing regression  towards this goal and reassessment and adjustment of goals/treatment have been made. She was advised against drinking alcohol with the narcotic pain medicines, advised against driving or handling machinery while adjusting the dose of medicines or if having cognitive  issues related to the current medications. Risk of overdose and death, if medicines not taken as prescribed, were also discussed. If the patient develops new symptoms or if the symptoms worsen, the patient should call the office.     While transcribing every attempt was made to maintain the accuracy of the note in terms of it's contents,there may have been some errors made inadvertently. Thank you for allowing me to participate in the care of this patient. Anderson Santana.  [facility] APRN-CNP     Cc: Sim Royal MD

## 2021-05-04 NOTE — PROGRESS NOTES
Pt states she was giving benadryl 25 mg and it is not working for itching along with the hydroxyzine 25 mg. Pt daughter states that pt has a rash around her vag area and buttocks.  New me's has been updated  In the system

## 2021-05-04 NOTE — PROGRESS NOTES
Post-Discharge Transitional Care Management Services or Hospital Follow Up      Karo Ace   YOB: 1945    Date of Office Visit:  5/4/2021  Date of Hospital Admission: 4/16/21  Date of Hospital Discharge: 4/24/ 21  Care management risk score Rising risk (score 2-5) and Complex Care (Scores >=6): 5     Non face to face  following discharge, date last encounter closed (first attempt may have been earlier): *No documented post hospital discharge outreach found in the last 14 days *No documented post hospital discharge outreach found in the last 14 days    Call initiated 2 business days of discharge: *No response recorded in the last 14 days    Patient Active Problem List   Diagnosis    ESRD (end stage renal disease)    Right knee DJD    Hypertension    Massive right Breast hypertrophy    Inguinal lymphadenopathy    Osteolysis of left lower leg    ZULEMA (obstructive sleep apnea)    Peripheral vascular disease ([address]    MRSA (methicillin resistant Staphylococcus aureus) carrier    Anemia    Infected prosthetic knee joint ([address]    Chronic pain of right knee    Tobacco dependence    Vitamin D deficiency    Failed total right knee replacement ([address]    History of infection of total joint prosthesis of knee    S/P TKR (total knee replacement)    Femoral artery stenosis, right ([address]    Chronic infection of bone ([address]    CAFL (chronic airflow limitation) ([address]    Personal history of other diseases of the circulatory system    H/O cardiomyopathy    Hypercholesterolemia    Laryngopharyngeal reflux    Morbid obesity ([address]    Neurogenic bladder    H/O thrombosis    Submandibular gland swelling    Vocal cord cyst    Edema glottis    Bilateral carotid artery stenosis    Right foot strain    History of total right knee replacement revision right knee arthroplasty 11/15/2016 for sepsis    Tibial tendonitis, posterior    Gastric mass    Osteoporosis    Bilateral shoulder region arthritis    Other allergic rhinitis    Pulmonary nodule    Hyperlipidemia    Chronic pain syndrome    DDD (degenerative disc disease), lumbosacral    Facet arthropathy, lumbosacral    Spondylolisthesis at L4-L5 level    Allergic rhinitis due to other allergen    Spinal stenosis of lumbar region    Rotator cuff tendonitis, left    History of bilateral knee arthroplasty    Osteoarthritis of right knee    Primary insomnia    Essential hypertension    Heartburn    Failed total knee arthroplasty (HCC)       Allergies   Allergen Reactions    Iv Dye [Iodides] Anaphylaxis    Lisinopril Swelling     Face swelling        Peanut Oil Swelling     Throat swelling per patient description    Hydralazine Itching           Lipitor [Atorvastatin] Other (See Comments)     myalgia       Medications listed as ordered at the time of discharge from hospital      Medications marked \"taking\" at this time  Outpatient Medications Marked as Taking for the 5/4/21 encounter (Virtual Visit) with Nikhil Caputo MD   Medication Sig Dispense Refill    levETIRAcetam (KEPPRA) 500 MG tablet Take 500 mg by mouth daily       diphenhydrAMINE (BENADRYL) 25 MG capsule Take 25 mg by mouth every 6 hours as needed for Itching      gabapentin (NEURONTIN) 400 MG capsule TAKE 1 CAPSULE BY MOUTH EVERY NIGHT 30 capsule 0    midodrine (PROAMATINE) 5 MG tablet TAKE 1 TABLET BY MOUTH THREE TIMES DAILY 90 tablet 2    Diclofenac Sodium POWD #5D Formula Diclo 3%, Lido 2%, Prilo 2% Pharm to comp: Apply to the shoulder twice a day 1 Bottle 1    DULoxetine (CYMBALTA) 30 MG extended release capsule Take 1 capsule by mouth daily 30 capsule 1    baclofen (LIORESAL) 10 MG tablet Take 1 tablet by mouth 2 times daily 60 tablet 0    pravastatin (PRAVACHOL) 40 MG tablet TAKE 1 TABLET BY MOUTH DAILY 30 tablet 1    traZODone (DESYREL) 100 MG tablet TAKE 1 TABLET BY MOUTH EVERY NIGHT AS NEEDED FOR SLEEP 30 tablet 0    silver sulfADIAZINE (SSD) medications ordered at time of hospital discharge: Yes    Chief Complaint   Patient presents with    Follow-Up from Hospital       History of Present illness - Follow up of Hospital diagnosis(es): PT ADMITTED TO [address] [place] [address]. PT DECLINED SURGERY AND CHEMORX  ESRD - ON DIALYSIS. [address] [place] [address]. EXERCISE / DIET COMPLIANCE . NO MUSCLE ACHES. LABS D/W PT  VIT D DEF -  VIT D 80258 WEEKLY - ? MED COMPLIANCE. PREVIOUS  LABS D/W PT. .   ALLERGIC RHINITIS - +  RHINORRHEA , OCC NASAL CONGESTION, NO POSTNASAL DRAINAGE , NO SINUS PRESSURE, NO HA, NO SNEEZING, + OCC WATERY ITCHY EYES. C/O CONSTIPATION - PAST FEW DAYS. IMPROVING   C/O ITCHING - SKIN - ON BENADRYL - NOT HELPING  C/O VAGINAL ITCHING - PERSISTENT. HAS NOT FOLLOWED WITH [facility]  C/O SKIN IRRITATION - INNER THIGH - STEVE. DENIES CP, No SOB, No PALPITATIONS, COUGH - IMPROVED. NO F/C  No ABD PAIN, No N/V, No DIARRHEA, + CONSTIPATION, No MELENA, No HEMATOCHEZIA.  NOT MAKING URINE AT THIS TIME    summary reviewed- see chart. Interval history/Current status: FAIR    There were no vitals filed for this visit. There is no height or weight on file to calculate BMI.    Wt Readings from Last 3 Encounters:   10/10/19 260 lb (117.9 kg)   10/02/19 264 lb (119.7 kg)   06/26/19 250 lb (113.4 kg)     BP Readings from Last 3 Encounters:   03/23/21 108/60   10/27/20 90/60   06/09/20 (!) 80/50       ROS: COMPREHENSIVE ROS AS IN HX, REST -VE  History obtained from chart review, daughter and the patient    PHYSICAL EXAMINATION:  [ INSTRUCTIONS:  \"[x]\" Indicates a positive item  \"[]\" Indicates a negative item  -- DELETE ALL ITEMS NOT EXAMINED]  Vital Signs: (As obtained by patient/caregiver or practitioner observation)    Blood pressure- 122/69 Heart rate- 67   Respiratory rate-    Temperature- 97.9 Pulse oximetry-     Constitutional: [x] Appears well-developed and well-nourished [] No apparent distress      [] Abnormal-   Mental status  [x] Alert and awake  [x] Oriented to person/place/time [x]Able to follow commands      Eyes:  EOM    [x]  Normal  [] Abnormal-  Sclera  [x]  Normal  [] Abnormal -         Discharge [x]  None visible  [] Abnormal -    HENT:   [x] Normocephalic, atraumatic. [] Abnormal   [x] Mouth/Throat: Mucous membranes are moist.     External Ears [x] Normal  [] Abnormal-     Neck: [x] No visualized mass     Pulmonary/Chest: [x] Respiratory effort normal.  [x] No visualized signs of difficulty breathing or respiratory distress        [] Abnormal-      Musculoskeletal:   [] Normal gait with no signs of ataxia         [x] Normal range of motion of neck        [] Abnormal-       Neurological:        [] No Facial Asymmetry (Cranial nerve 7 motor function) (limited exam to video visit)          [] No gaze palsy        [x] Abnormal- GAIT DISTURBANCE - OLD        Skin:        [x] No significant exanthematous lesions or discoloration noted on facial skin         [x] Abnormal- SKIN IRRITATION NOTED MEDIAL THIGH             Psychiatric:       [x] Normal Affect [x] No Hallucinations        [] Abnormal-     Other pertinent observable physical exam findings-  NO SINUS TENDERNESS    PREVIOUS LABS  REVIEWED AND D/W PT     ASSESSMENT/PLAN:     Diagnosis Orders   1. Seizure ([facility]  COUNSELLED. MONITOR ON KEPPRA. F/U NEURO  ADVISED SZ PRECAUTIONS  AK DISCHARGE MEDS RECONCILED W/ CURRENT OUTPATIENT MED LIST  MAKE CHANGES AS NEEDED. 2. Adenocarcinoma of cecum ([facility]  COUNSELLED. PT PREFERS TO HOLD OF ON ANY INTERVENTION AT THIS TIME - READDRESS  AK DISCHARGE MEDS RECONCILED W/ CURRENT OUTPATIENT MED LIST  MAKE CHANGES AS NEEDED. 3. ESRD (end stage renal disease) ([facility]  COUNSELLED. ON DIALYSIS. F/U RENAL  MAKE CHANGES AS NEEDED. 4. Hyperlipidemia LDL goal <100  COUNSELLED. MONITOR ON PRAVACHOL  ADVISED LOW FAT / CHOL DIET/ EXERCISE.  MONITOR. GOALS D/W PT.  MAKE CHANGES AS NEEDED. 5. Vitamin D deficiency  COUNSELLED. MONITOR ON VIT D SUPPLEMENT. MAKE CHANGES AS NEEDED. 6. Other allergic rhinitis  COUNSELLED. MED PRN. MONITOR  MAKE CHANGES AS NEEDED. 7. Other constipation  COUNSELLED. SYMPTOMATIC RX. WARM PRUNE JUICE PRN. DIETARY MODIFICATION. MIRALAX PRN. MONITOR  MAKE CHANGES AS NEEDED. 8. Pruritus  COUNSELLED. MONITOR FOR TRIGGER  ATARAX 25 MG PRN  READDRESS WITH NEPHROLOGY  MAKE CHANGES AS NEEDED. 9. Vaginal itching  COUNSELLED. RX FOR CANDIDIASIS - DIFLUCAN 150 MG  ADVISED F/U GY[facility]  MAKE CHANGES AS NEEDED. 10. Skin irritation - THIGHS  COUNSELLED. ADVISED ON SKIN CARE. MAINTAIN HYGIENE  silver sulfADIAZINE (SILVADENE) 1 % cream PRN. MONITOR  MAKE CHANGES AS NEEDED.               MEDICATION SIDE EFFECTS D/W PATIENT     RETURN TO CLINIC WITHIN 2 MONTHS / PRN    FOLLOW UP FOR FASTING LABS    Darryl Campbell, was evaluated through a synchronous (real-time) audio-video encounter. The patient (or guardian if applicable) is aware that this is a billable service. Verbal consent to proceed has been obtained within the past 12 months. The visit was conducted pursuant to the emergency declaration under the [facility], [address] [place] [address] authority and the [employer] General Act. Patient identification was verified, and a caregiver was present when appropriate. The patient was located in a state where the provider was credentialed to provide care. --Lauri Calvert MD on 5/6/2021 at 8:55 PM    An electronic signature was used to authenticate this note.             Medical Decision Making: moderate complexity

## 2021-05-11 NOTE — TELEPHONE ENCOUNTER
Patient prescribed itch medication and it is not working hydrocodzine 25mg is medication requesting increase in dosage or another medication due to her being a diaylisis patient not sure if this can be done .

## 2021-05-11 NOTE — TELEPHONE ENCOUNTER
Patient had a virtual visit with physician and physician was suppose to put in [phone] phone number [phone]. Patient prescribed itch medication and it is not working hydrocodzine 25mg is medication requesting increase in dosage or another medication due to her being a [facility] patient not sure if this can be done .

## 2021-05-12 NOTE — TELEPHONE ENCOUNTER
NO CHANGE IN ATARAX DOSE  PLEASE HAVE PT ADDRESS WITH NEPHROLOGY  IF PERSISTENT ITCHING  READDRESS DERM FRAN

## 2021-05-20 NOTE — PATIENT INSTRUCTIONS
Patient Education        Back Stretches: Exercises  Introduction  Here are some examples of exercises for stretching your back. Start each exercise slowly. Ease off the exercise if you start to have pain. Your doctor or physical therapist will tell you when you can start these exercises and which ones will work best for you. How to do the exercises  Overhead stretch   1. Stand comfortably with your feet shoulder-width apart. 2. Looking straight ahead, raise both arms over your head and reach toward the ceiling. Do not allow your head to tilt back. 3. Hold for 15 to 30 seconds, then lower your arms to your sides. 4. Repeat 2 to 4 times. Side stretch   1. Stand comfortably with your feet shoulder-width apart. 2. Raise one arm over your head, and then lean to the other side. 3. Slide your hand down your leg as you let the weight of your arm gently stretch your side muscles. Hold for 15 to 30 seconds. 4. Repeat 2 to 4 times on each side. Press-up   1. Lie on your stomach, supporting your body with your forearms. 2. Press your elbows down into the floor to raise your upper back. As you do this, relax your stomach muscles and allow your back to arch without using your back muscles. As your press up, do not let your hips or pelvis come off the floor. 3. Hold for 15 to 30 seconds, then relax. 4. Repeat 2 to 4 times. Relax and rest   1. Lie on your back with a rolled towel under your neck and a pillow under your knees. Extend your arms comfortably to your sides. 2. Relax and breathe normally. 3. Remain in this position for about 10 minutes. 4. If you can, do this 2 or 3 times each day. Follow-up care is a key part of your treatment and safety. Be sure to make and go to all appointments, and call your doctor if you are having problems. It's also a good idea to know your test results and keep a list of the medicines you take. Where can you learn more? Go to [email or web address]

## 2021-05-20 NOTE — TELEPHONE ENCOUNTER
Patient is out of itch medication. Please refill the hydroxyzine sent to [facility] [address]. Also, she needs a medication with anxiety per dialysis and something for stomach cramping.  Please advise

## 2021-05-20 NOTE — PROGRESS NOTES
entire area  with radiation to the abdomen, upper leg Bilateral and lower leg Bilateral . She rates the pain 8/10 and describes it as aching, stabbing. Current treatment regimen has helped relieve about 50% of the pain. She denies any side effects from the current pain regimen. Patient reports that since the last follow up visit the physical functioning is unchanged, family/social relationships are unchanged, mood is unchanged sleep patterns are unchanged, and that the overall functioning is unchanged. Patient denies misusing/abusing her narcotic pain medications or using any illegal drugs. Upon obtaining medical history from Ms. Alvares Oar states that pain is manageable on current pain therapy. She is currently taking Neurontin 300 mg once weekly. Mood is stable without anxiety. Sleep is fair with an average of 5-6 hours. Denies to having issues of constipation. Tolerating activities/house chores with moderate tenderness to the lower back. ALLERGIES: Patients list of allergies were reviewed     MEDICATIONS: Ms. Giorgio Yin list of medications were reviewed. Her current medications are   Outpatient Medications Prior to Visit   Medication Sig Dispense Refill    midodrine (PROAMATINE) 5 MG tablet TAKE 1 TABLET BY MOUTH THREE TIMES DAILY 90 tablet 2    levETIRAcetam (KEPPRA) 500 MG tablet Take 500 mg by mouth daily       loperamide (IMODIUM) 2 MG capsule Take 2 mg by mouth as needed for Diarrhea      pravastatin (PRAVACHOL) 40 MG tablet Take 1 tablet by mouth daily 30 tablet 1    vitamin D (ERGOCALCIFEROL) 1.25 MG (94139 UT) CAPS capsule Take 1 capsule by mouth once a week 4 capsule 1    silver sulfADIAZINE (SILVADENE) 1 % cream Apply topically daily./ PRN 30 g 0    polyethylene glycol (GLYCOLAX) 17 GM/SCOOP powder Take 17 g by mouth daily as needed (PRN) 510 g 5    hydrOXYzine (ATARAX) 25 MG tablet Take 1 tablet by mouth every 12 hours as needed for Itching 30 tablet 0    Diclofenac Sodium POWD #5D Formula Diclo 3%, Lido 2%, Prilo 2% Pharm to comp: Apply to the shoulder twice a day 1 Bottle 1    DULoxetine (CYMBALTA) 30 MG extended release capsule Take 1 capsule by mouth daily 30 capsule 1    silver sulfADIAZINE (SSD) 1 % cream APPLY EXTERNALLY TO THE AFFECTED AREA DAILY 400 g 0    loratadine (CLARITIN) 10 MG tablet TAKE 1 TABLET BY MOUTH DAILY AS NEEDED 30 tablet 1    brompheniramine-pseudoephedrine-DM (BROMFED DM) 2-30-10 MG/5ML syrup Take 5 mLs by mouth 4 times daily as needed for Cough 240 mL 0    benzonatate (TESSALON) 200 MG capsule TAKE 1 CAPSULE BY MOUTH TWICE DAILY AS NEEDED FOR COUGH 30 capsule 0    dermacerin (EUCERIN) CREA cream SMALL AMOUNT BID / PRN 1 Tube 0    fluticasone (FLONASE) 50 MCG/ACT nasal spray 2 sprays by Nasal route daily as needed for Rhinitis 1 Bottle 1    Incontinence Supply Disposable (DEPEND UNDERWEAR LARGE/XL) MISC 1 Device by Does not apply route 4 times daily as needed (PRN) 120 each 2    Misc.  Devices (GEL-FOAM CUSHION) MISC 1 Device by Does not apply route daily 1 each 0    Blood Pressure Monitor RACHELL 1 capsule by Does not apply route daily 1 Device 0    amiodarone (CORDARONE) 200 MG tablet Take 200 mg by mouth 2 times daily     [address] [facility] [address] by Does not apply route 1 each 0    Blood Pressure Monitor RACHELL 1 Device by Does not apply route daily 1 Device 0    aspirin (ASPIRIN LOW DOSE) 81 MG EC tablet Take 1 tablet by mouth daily 30 tablet 5    albuterol sulfate HFA (PROAIR HFA) 108 (90 Base) MCG/ACT inhaler Inhale 2 puffs into the lungs every 6 hours as needed for Shortness of Breath 1 Inhaler 0    Scooter MISC by Does not apply route 1 each 0    clopidogrel (PLAVIX) 75 MG tablet Take 1 tablet by mouth daily 30 tablet 3    Blood Pressure Monitor MISC 1 Device by Does not apply route as needed (prn) 1 each 0    Blood Pressure Monitoring (5 SERIES BP MONITOR) RACHELL 1 Device by Does not apply route as needed (PRN) 1 Device 0    calcium acetate (PHOSLO) 667 MG capsule Take by mouth 3 times daily (with meals)      gabapentin (NEURONTIN) 400 MG capsule TAKE 1 CAPSULE BY MOUTH EVERY NIGHT 30 capsule 0    baclofen (LIORESAL) 10 MG tablet Take 1 tablet by mouth 2 times daily 60 tablet 0     No facility-administered medications prior to visit. SOCIAL/FAMILY/PAST MEDICAL HISTORY: Ms. Himanshu Baez, family and past medical history was reviewed. REVIEW OF SYSTEMS:    Respiratory: Negative for apnea, chest tightness and shortness of breath or change in baseline breathing. Gastrointestinal: Negative for nausea, vomiting, abdominal pain, diarrhea, constipation, blood in stool and abdominal distention. PHYSICAL EXAM:   Nursing note and vitals reviewed. LMP 01/21/1964  as per patient  Constitutional: She appears well-developed and well-nourished. No acute distress. Skin: Skin appears to be warm and dry. No rashes or any other marks noted. She is not diaphoretic. Neurological/Psychiatric:She is alert and oriented to person, place, and time. Coordination is  normal.  Her mood isAppropriate and affect is Flat/blunted. Her behavior is normal.   thought content normal.   Musculoskeletal / Extremities: Not assessed  IMPRESSION:   1. Chronic pain syndrome    2. Spinal stenosis of lumbar region, unspecified whether neurogenic claudication present    3. DDD (degenerative disc disease), lumbosacral    4. Spondylolisthesis at L4-L5 level    5. Facet arthropathy, lumbosacral    6. Bilateral shoulder region arthritis    7. Localized osteoarthritis of knees, bilateral    8. Primary insomnia    9. Gastric mass        PLAN:  Informed verbal consent regarding treatment was obtained  -Take Neurontin 200 mg nightly  -Continue with Cymbalta  -Maintain f/u with oncology for cancer, has an appointment with surgery oncology next week to evaluate surgical options for gastric mass.  She was deemed not a candidate for chemotherapy  -CBT techniques- relaxation therapies such as biofeedback, mindfulness based stress reduction, imagery, cognitive restructuring, problem solving discussed with patient  -Last UDS 8/28/19 Consistent  -Return in about 4 weeks (around 6/17/2021). -[place] was obtained and reviewed  for the last one year and no indicators of drug misuse  were found. Any other controlled substance prescriptions  seen on the record have been accounted for, I am aware of the patient receiving these medications. Inda Brittle [place] record will be rechecked as part of office protocol. Current Outpatient Medications   Medication Sig Dispense Refill    gabapentin (NEURONTIN) 100 MG capsule Take 2 capsules by mouth nightly for 30 days.  60 capsule 0    midodrine (PROAMATINE) 5 MG tablet TAKE 1 TABLET BY MOUTH THREE TIMES DAILY 90 tablet 2    levETIRAcetam (KEPPRA) 500 MG tablet Take 500 mg by mouth daily       loperamide (IMODIUM) 2 MG capsule Take 2 mg by mouth as needed for Diarrhea      pravastatin (PRAVACHOL) 40 MG tablet Take 1 tablet by mouth daily 30 tablet 1    vitamin D (ERGOCALCIFEROL) 1.25 MG (56972 UT) CAPS capsule Take 1 capsule by mouth once a week 4 capsule 1    silver sulfADIAZINE (SILVADENE) 1 % cream Apply topically daily./ PRN 30 g 0    polyethylene glycol (GLYCOLAX) 17 GM/SCOOP powder Take 17 g by mouth daily as needed (PRN) 510 g 5    Diclofenac Sodium POWD #5D Formula Diclo 3%, Lido 2%, Prilo 2% Pharm to comp: Apply to the shoulder twice a day 1 Bottle 1    DULoxetine (CYMBALTA) 30 MG extended release capsule Take 1 capsule by mouth daily 30 capsule 1    silver sulfADIAZINE (SSD) 1 % cream APPLY EXTERNALLY TO THE AFFECTED AREA DAILY 400 g 0    loratadine (CLARITIN) 10 MG tablet TAKE 1 TABLET BY MOUTH DAILY AS NEEDED 30 tablet 1    brompheniramine-pseudoephedrine-DM (BROMFED DM) 2-30-10 MG/5ML syrup Take 5 mLs by mouth 4 times daily as needed for Cough 240 mL 0    benzonatate (TESSALON) 200 MG capsule TAKE 1 CAPSULE BY MOUTH TWICE DAILY AS NEEDED FOR COUGH 30 capsule 0    dermacerin (EUCERIN) CREA cream SMALL AMOUNT BID / PRN 1 Tube 0    fluticasone (FLONASE) 50 MCG/ACT nasal spray 2 sprays by Nasal route daily as needed for Rhinitis 1 Bottle 1    Incontinence Supply Disposable (DEPEND UNDERWEAR LARGE/XL) MISC 1 Device by Does not apply route 4 times daily as needed (PRN) 120 each 2    Misc. Devices (GEL-FOAM CUSHION) MISC 1 Device by Does not apply route daily 1 each 0    Blood Pressure Monitor RACHELL 1 capsule by Does not apply route daily 1 Device 0    amiodarone (CORDARONE) 200 MG tablet Take 200 mg by mouth 2 times daily     [address] by Does not apply route 1 each 0    Blood Pressure Monitor RACHELL 1 Device by Does not apply route daily 1 Device 0    aspirin (ASPIRIN LOW DOSE) 81 MG EC tablet Take 1 tablet by mouth daily 30 tablet 5    albuterol sulfate HFA (PROAIR HFA) 108 (90 Base) MCG/ACT inhaler Inhale 2 puffs into the lungs every 6 hours as needed for Shortness of Breath 1 Inhaler 0    Scooter MISC by Does not apply route 1 each 0    clopidogrel (PLAVIX) 75 MG tablet Take 1 tablet by mouth daily 30 tablet 3    Blood Pressure Monitor MISC 1 Device by Does not apply route as needed (prn) 1 each 0    Blood Pressure Monitoring (5 SERIES BP MONITOR) RACHELL 1 Device by Does not apply route as needed (PRN) 1 Device 0    calcium acetate (PHOSLO) 667 MG capsule Take by mouth 3 times daily (with meals)      hydrOXYzine (ATARAX) 25 MG tablet Take 1 tablet by mouth every 12 hours as needed for Itching 30 tablet 0     No current facility-administered medications for this visit. I will continue her current medication regimen  which is part of the above treatment schedule. It has been helping with Ms. Monreal's chronic  medical problems which for this visit include:   Diagnoses of Chronic pain syndrome, Spinal stenosis of lumbar region, unspecified whether neurogenic claudication present, DDD (degenerative disc disease), lumbosacral, Spondylolisthesis at L4-L5 level, Facet arthropathy, lumbosacral, Bilateral shoulder region arthritis, Localized osteoarthritis of knees, bilateral, Primary insomnia, and Gastric mass were pertinent to this visit. Risks and benefits of the medications and other alternative treatments  including no treatment were discussed with the patient. The common side effects of these medications were also explained to the patient. Informed verbal consent was obtained. Goals of current treatment regimen include improvement in pain, restoration of functioning- with focus on improvement in physical performance, general activity, work or disability,emotional distress, health care utilization and  decreased medication consumption. Will continue to monitor progress towards achieving/maintaining therapeutic goals with special emphasis on  1. Improvement in perceived interfernce  of pain with ADL's. Ability to do home exercises independently. Ability to do household chores indoor and/or outdoor work and social and leisure activities. Improve psychosocial and physical functioning. - she is not showing any significant progress/or showing regression  towards this goal and reassessment and adjustment of goals/treatment have been made. She was advised against drinking alcohol with the narcotic pain medicines, advised against driving or handling machinery while adjusting the dose of medicines or if having cognitive  issues related to the current medications. Risk of overdose and death, if medicines not taken as prescribed, were also discussed. If the patient develops new symptoms or if the symptoms worsen, the patient should call the office. While transcribing every attempt was made to maintain the accuracy of the note in terms of it's contents,there may have been some errors made inadvertently. Thank you for allowing me to participate in the care of this patient. Eva Arambula.  Rendall Schlatter APRN-CNP     Cc: Kev Johnson MD

## 2021-05-20 NOTE — TELEPHONE ENCOUNTER
Patient is out of itch medication. Please refill the hydroxyzine sent to [facility] on [address]. Also, she needs a medication with anxiety per dialysis and something for stomach cramping.  Please advise

## 2021-05-21 NOTE — TELEPHONE ENCOUNTER
I called pt to let her know that her med was recorded and that she may go pick that up I also let her know that she has labs in

## 2021-06-15 NOTE — TELEPHONE ENCOUNTER
descharged on the 8th from hospital patient was prescribed these medications and is needing polyethylene glycol prevastatin 40mg levetiracetam 500mg (keppra), loperamide 2mg ondansetorn 4mg (zorfan). Please advise. No

## 2021-06-15 NOTE — TELEPHONE ENCOUNTER
descharged on the 8th from hospital patient was prescribed these medications and is needing polyethylene glycol prevastatin 40mg levetiracetam 500mg (keppra), loperamide 2mg ondansetorn 4mg (zorfan).  Please advise

## 2021-06-17 NOTE — TELEPHONE ENCOUNTER
CALLED AND D/W PT'S DAUGHTER  PRAVACHOL AND KEPPRA REFILLED  STATES HAS ZOFRAN    DEFERRED OTHER MEDS    F/U APPT  PT'S DAUGHTER  VERBALIZED UNDERSTANDING

## 2021-06-28 NOTE — TELEPHONE ENCOUNTER
Hospice Care is call to confirm if the physician will follow the patient through Hospice care.  Please advise

## 2021-06-30 NOTE — TELEPHONE ENCOUNTER
I called and lefty v/m for sarah that dr Erika Loco would like for hospice dr to follow pt for hospice care

## 2021-07-01 NOTE — PROGRESS NOTES
2021    TELEHEALTH EVALUATION -- Audio/Visual (During [id number] public health emergency)    PLACE OF SERVICE: PATIENT'S HOME    HPI: SEE BELOW    Lukasz Velásquez (:  1945) has requested an audio/video evaluation for the following concern(s):    RT SHOULDER PAIN - STATES FOR YEARS. ? Byron Nilsa. ? PAIN SCALE 8/ 10. DENIES RECENT TRAUMA. PT FOLLOWING ALSO WITH PAIN MGT  C/O PAIN - LT SIDE ABD - PAST 3 WEEKS. SHARP PAIN. NO RAD. PAIN SCALE 8/10  C/O COUGH -  INCREASED PAST FEW WEEKS. OCC PRODUCTIVE - ? COLOR OF PHLEGM, NO HEMOPTYSIS, NO F/C, NO KNOWN SICK CONTACT   SZ - PT SEEN IN ER  DUE TO SZ AT TIME DIALYSIS. PT STATES SHE DOES NOT CALL. PT ON KEPPRA  [facility] -  TAKING PRAVACHOL. EXERCISE / DIET COMPLIANCE . NO MUSCLE ACHES. LABS D/W PT  [facility] -  PT DECLINED SURGERY AND CHEMORX. PT STATES NOT IN HOSPICE  ESRD - ON DIALYSIS. FOLLOWING WITH NEPHROLOGY  ALLERGIC RHINITIS - NO  RHINORRHEA , ? NO NASAL CONGESTION, + POSTNASAL DRAINAGE , NO SINUS PRESSURE, NO HA, NO SNEEZING, + OCC WATERY ITCHY EYES.       DENIES CP, No SOB, No PALPITATIONS,    ABD PAIN - AS ABOVE, OCC NAUSEA, NO VOMITING, No DIARRHEA,  CONSTIPATION -I MPROVING, No MELENA, No HEMATOCHEZIA.  NOT MAKING URINE AT THIS TIME        Allergies   Allergen Reactions    Iv Dye [Iodides] Anaphylaxis    Lisinopril Swelling     Face swelling        Peanut Oil Swelling     Throat swelling per patient description    Hydralazine Itching           Lipitor [Atorvastatin] Other (See Comments)     myalgia         Prior to Visit Medications    Medication Sig Taking?  Authorizing Provider   levETIRAcetam (KEPPRA) 500 MG tablet Take 1 tablet by mouth daily Yes Jennifer Motta MD   pravastatin (PRAVACHOL) 40 MG tablet Take 1 tablet by mouth daily Yes Jennifer Motta MD   DULoxetine (CYMBALTA) 30 MG extended release capsule TAKE 1 CAPSULE BY MOUTH DAILY Yes TORSTEN Briceño - CNP   gabapentin (NEURONTIN) 100 MG capsule Take 2 capsules by mouth nightly for 30 days. Yes TORSTEN Greene CNP   hydrOXYzine (ATARAX) 25 MG tablet Take 1 tablet by mouth every 12 hours as needed for Itching Yes Whitney Teixeira MD   midodrine (PROAMATINE) 5 MG tablet TAKE 1 TABLET BY MOUTH THREE TIMES DAILY Yes Whitney Teixeira MD   levETIRAcetam (KEPPRA) 500 MG tablet Take 500 mg by mouth daily  Yes Historical Provider, MD   loperamide (IMODIUM) 2 MG capsule Take 2 mg by mouth as needed for Diarrhea Yes Historical Provider, MD   vitamin D (ERGOCALCIFEROL) 1.25 MG (43588 UT) CAPS capsule Take 1 capsule by mouth once a week Yes Whitney Teixeira MD   silver sulfADIAZINE (SILVADENE) 1 % cream Apply topically daily./ PRN Yes Whitney Teixeira MD   Diclofenac Sodium POWD #5D Formula Diclo 3%, Lido 2%, Prilo 2% Pharm to comp: Apply to the shoulder twice a day Yes TORSTEN Greene CNP   silver sulfADIAZINE (SSD) 1 % cream APPLY EXTERNALLY TO THE AFFECTED AREA DAILY Yes Whitney Teixeira MD   loratadine (CLARITIN) 10 MG tablet TAKE 1 TABLET BY MOUTH DAILY AS NEEDED Yes Whitney Teixeira MD   brompheniramine-pseudoephedrine-DM (BROMFED DM) 2-30-10 MG/5ML syrup Take 5 mLs by mouth 4 times daily as needed for Cough Yes Whitney Teixeira MD   benzonatate (TESSALON) 200 MG capsule TAKE 1 CAPSULE BY MOUTH TWICE DAILY AS NEEDED FOR COUGH Yes Whitney Teixeira MD   dermacerin (EUCERIN) CREA cream SMALL AMOUNT BID / PRN Yes Whitney Teixeira MD   fluticasone (FLONASE) 50 MCG/ACT nasal spray 2 sprays by Nasal route daily as needed for Rhinitis Yes Whitney Teixeira MD   Incontinence Supply Disposable (DEPEND UNDERWEAR LARGE/XL) MISC 1 Device by Does not apply route 4 times daily as needed (PRN) Yes Whitney Teixeira MD   Misc.  Devices (GEL-FOAM CUSHION) MISC 1 Device by Does not apply route daily Yes Whitney Teixeira MD   Blood Pressure Monitor RACHELL 1 capsule by Does not apply route daily Yes Whitney Teixeira MD   amiodarone (CORDARONE) 200 MG tablet Take 200 mg by mouth 2 times daily Yes Historical Provider, 1 [facility] by Does not apply route Yes Estevan Jaramillo MD   Blood Pressure Monitor RACHELL 1 Device by Does not apply route daily Yes Estevan Jaramillo MD   aspirin (ASPIRIN LOW DOSE) 81 MG EC tablet Take 1 tablet by mouth daily Yes Estevan Jaramillo MD   albuterol sulfate HFA (PROAIR HFA) 108 (90 Base) MCG/ACT inhaler Inhale 2 puffs into the lungs every 6 hours as needed for Shortness of Breath Yes Estevan Jaramillo MD   Scooter MISC by Does not apply route Yes Estevan Jaramillo MD   clopidogrel (PLAVIX) 75 MG tablet Take 1 tablet by mouth daily Yes TORSTEN Snow - CNP   Blood Pressure Monitor MISC 1 Device by Does not apply route as needed (prn) Yes Estevan Jaramillo MD   Blood Pressure Monitoring (5 SERIES BP MONITOR) RACHELL 1 Device by Does not apply route as needed (PRN) Yes Estevan Jaramillo MD   calcium acetate (PHOSLO) 667 MG capsule Take by mouth 3 times daily (with meals) Yes Historical Provider, MD       Social History     Tobacco Use    Smoking status: Former Smoker     Packs/day: 0.00     Years: 54.00     Pack years: 0.00     Types: Cigarettes     Start date: 1/1/1962     Quit date: 3/20/2018     Years since quitting: 3.2    Smokeless tobacco: Never Used   Substance Use Topics    Alcohol use: No     Comment: rare    Drug use: No        ROS: COMPREHENSIVE ROS AS IN HX, REST -VE  History obtained from chart review, daughter and the patient    PHYSICAL EXAMINATION:  [ INSTRUCTIONS:  \"[x]\" Indicates a positive item  \"[]\" Indicates a negative item  -- DELETE ALL ITEMS NOT EXAMINED]  Vital Signs: (As obtained by patient/caregiver or practitioner observation)    Blood pressure- 137/87 Heart rate- 74   Respiratory rate-    Temperature-  Pulse oximetry-     Constitutional: [x] Appears well-developed and well-nourished [] No apparent distress      [] Abnormal-   Mental status  [x] Alert and awake  [x] Oriented to person/place/time [x]Able to follow commands      Eyes:  EOM    [x] HOSPICE CARE WITH DAUGHTER / FAMILY  MAKE CHANGES AS NEEDED. 7. ESRD (end stage renal disease) ([facility] [address]  COUNSELLED. AVOID NSAIDS. MONITOR. ON HD - F/U RENAL  MAKE CHANGES AS NEEDED. 8. Other allergic rhinitis  COUNSELLED. ADVISED CLARITIN 10 MG DAILY / PRN. MONITOR. MAKE CHANGES AS NEEDED.              MEDICATION SIDE EFFECTS D/W PATIENT     RETURN TO CLINIC [place] / PRN        Adriel Lang, was evaluated through a synchronous (real-time) audio-video encounter. The patient (or guardian if applicable) is aware that this is a billable service. Verbal consent to proceed has been obtained within the past 12 months. The visit was conducted pursuant to the emergency declaration under the [address] [place] [address] authority and the [employer] General Act. Patient identification was verified, and a caregiver was present when appropriate. The patient was located in a state where the provider was credentialed to provide care. --Karlie Delgado MD on 7/1/2021 at 8:37 PM    An electronic signature was used to authenticate this note.

## 2021-07-14 ENCOUNTER — TELEPHONE (OUTPATIENT)
Dept: INTERNAL MEDICINE CLINIC | Age: 76
End: 2021-07-14

## 2021-07-21 ENCOUNTER — TELEPHONE (OUTPATIENT)
Dept: INTERNAL MEDICINE CLINIC | Age: 76
End: 2021-07-21

## 2022-05-28 NOTE — TELEPHONE ENCOUNTER
I called pt to let her know that I spoke with [facility] and pharmacist stated that pt needs a level 3 bed sore to get a new gel matrress due to her just getting one in June pharmacist also stated that he spoke with pt about a new hospital mattress and gave her a price on it pt just got hospital mattress in 2002 pt understood and will give [facility] a call back
Yes - the patient is able to be screened

## (undated) DEVICE — UNIVERSAL BLOCK TRAY: Brand: MEDLINE INDUSTRIES, INC.

## (undated) DEVICE — CHLORAPREP 26ML ORANGE

## (undated) DEVICE — STERILE POLYISOPRENE POWDER-FREE SURGICAL GLOVES: Brand: PROTEXIS

## (undated) DEVICE — GAUZE,SPONGE,4"X4",16PLY,STRL,LF,10/TRAY: Brand: MEDLINE

## (undated) DEVICE — ALCOHOL RUBBING 16OZ 70% ISO

## (undated) DEVICE — TOWEL,OR,DSP,ST,BLUE,STD,4/PK,20PK/CS: Brand: MEDLINE